# Patient Record
Sex: FEMALE | Race: WHITE | Employment: OTHER | URBAN - METROPOLITAN AREA
[De-identification: names, ages, dates, MRNs, and addresses within clinical notes are randomized per-mention and may not be internally consistent; named-entity substitution may affect disease eponyms.]

---

## 2018-06-10 ENCOUNTER — APPOINTMENT (OUTPATIENT)
Dept: GENERAL RADIOLOGY | Age: 68
End: 2018-06-10
Payer: MEDICARE

## 2018-06-10 ENCOUNTER — HOSPITAL ENCOUNTER (EMERGENCY)
Age: 68
Discharge: HOME | End: 2018-06-10
Attending: FAMILY MEDICINE
Payer: MEDICARE

## 2018-06-10 VITALS
RESPIRATION RATE: 17 BRPM | HEIGHT: 68 IN | WEIGHT: 217 LBS | OXYGEN SATURATION: 97 % | TEMPERATURE: 98.2 F | HEART RATE: 82 BPM | DIASTOLIC BLOOD PRESSURE: 72 MMHG | BODY MASS INDEX: 32.89 KG/M2 | SYSTOLIC BLOOD PRESSURE: 140 MMHG

## 2018-06-10 DIAGNOSIS — J20.9 ACUTE BRONCHITIS, UNSPECIFIED ORGANISM: Primary | ICD-10-CM

## 2018-06-10 LAB
BILIRUBIN URINE: NEGATIVE
BLOOD, URINE: NEGATIVE
CLARITY: CLEAR
COLOR: YELLOW
GLUCOSE URINE: >=1000 MG/DL
KETONES, URINE: NEGATIVE MG/DL
LEUKOCYTE ESTERASE, URINE: NEGATIVE
NITRITE, URINE: NEGATIVE
PH UA: 5.5 (ref 5–9)
PROTEIN UA: NEGATIVE MG/DL
SPECIFIC GRAVITY UA: 1.01 (ref 1–1.03)
UROBILINOGEN, URINE: 0.2 E.U./DL

## 2018-06-10 PROCEDURE — 81003 URINALYSIS AUTO W/O SCOPE: CPT

## 2018-06-10 PROCEDURE — 87077 CULTURE AEROBIC IDENTIFY: CPT

## 2018-06-10 PROCEDURE — 6370000000 HC RX 637 (ALT 250 FOR IP): Performed by: FAMILY MEDICINE

## 2018-06-10 PROCEDURE — 87088 URINE BACTERIA CULTURE: CPT

## 2018-06-10 PROCEDURE — 71046 X-RAY EXAM CHEST 2 VIEWS: CPT

## 2018-06-10 PROCEDURE — 99284 EMERGENCY DEPT VISIT MOD MDM: CPT

## 2018-06-10 PROCEDURE — 87186 SC STD MICRODIL/AGAR DIL: CPT

## 2018-06-10 RX ORDER — PREDNISONE 20 MG/1
40 TABLET ORAL ONCE
Status: COMPLETED | OUTPATIENT
Start: 2018-06-10 | End: 2018-06-10

## 2018-06-10 RX ORDER — IPRATROPIUM BROMIDE AND ALBUTEROL SULFATE 2.5; .5 MG/3ML; MG/3ML
1 SOLUTION RESPIRATORY (INHALATION) ONCE
Status: COMPLETED | OUTPATIENT
Start: 2018-06-10 | End: 2018-06-10

## 2018-06-10 RX ORDER — PREDNISONE 10 MG/1
TABLET ORAL
Qty: 10 TABLET | Refills: 0 | Status: SHIPPED | OUTPATIENT
Start: 2018-06-10 | End: 2018-06-20

## 2018-06-10 RX ORDER — AZITHROMYCIN 250 MG/1
TABLET, FILM COATED ORAL
Qty: 1 PACKET | Refills: 0 | Status: ON HOLD | OUTPATIENT
Start: 2018-06-10 | End: 2018-12-07 | Stop reason: HOSPADM

## 2018-06-10 RX ADMIN — PREDNISONE 40 MG: 20 TABLET ORAL at 14:17

## 2018-06-10 RX ADMIN — IPRATROPIUM BROMIDE AND ALBUTEROL SULFATE 1 AMPULE: .5; 3 SOLUTION RESPIRATORY (INHALATION) at 14:17

## 2018-06-12 LAB
ORGANISM: ABNORMAL
ORGANISM: ABNORMAL
URINE CULTURE, ROUTINE: ABNORMAL

## 2018-09-10 ENCOUNTER — HOSPITAL ENCOUNTER (OUTPATIENT)
Age: 68
Discharge: HOME | End: 2018-09-12
Payer: MEDICARE

## 2018-09-10 PROCEDURE — 87088 URINE BACTERIA CULTURE: CPT

## 2018-09-10 PROCEDURE — 87186 SC STD MICRODIL/AGAR DIL: CPT

## 2018-09-12 LAB
ORGANISM: ABNORMAL
URINE CULTURE, ROUTINE: ABNORMAL
URINE CULTURE, ROUTINE: ABNORMAL

## 2018-12-04 ENCOUNTER — HOSPITAL ENCOUNTER (OUTPATIENT)
Age: 68
Discharge: HOME | End: 2018-12-06
Payer: MEDICARE

## 2018-12-04 PROCEDURE — 87088 URINE BACTERIA CULTURE: CPT

## 2018-12-04 PROCEDURE — 87186 SC STD MICRODIL/AGAR DIL: CPT

## 2018-12-04 PROCEDURE — 87077 CULTURE AEROBIC IDENTIFY: CPT

## 2018-12-05 ENCOUNTER — APPOINTMENT (OUTPATIENT)
Dept: GENERAL RADIOLOGY | Age: 68
DRG: 872 | End: 2018-12-05
Payer: MEDICARE

## 2018-12-05 ENCOUNTER — HOSPITAL ENCOUNTER (INPATIENT)
Age: 68
LOS: 2 days | Discharge: HOME | DRG: 872 | End: 2018-12-07
Attending: EMERGENCY MEDICINE | Admitting: INTERNAL MEDICINE
Payer: MEDICARE

## 2018-12-05 DIAGNOSIS — A41.9 SEPTICEMIA (HCC): ICD-10-CM

## 2018-12-05 DIAGNOSIS — N39.0 URINARY TRACT INFECTION WITHOUT HEMATURIA, SITE UNSPECIFIED: Primary | ICD-10-CM

## 2018-12-05 PROBLEM — Z16.12 UTI DUE TO EXTENDED-SPECTRUM BETA LACTAMASE (ESBL) PRODUCING ESCHERICHIA COLI: Status: ACTIVE | Noted: 2018-12-05

## 2018-12-05 PROBLEM — B96.29 UTI DUE TO EXTENDED-SPECTRUM BETA LACTAMASE (ESBL) PRODUCING ESCHERICHIA COLI: Status: ACTIVE | Noted: 2018-12-05

## 2018-12-05 LAB
ALBUMIN SERPL-MCNC: 4.6 G/DL (ref 3.5–5.2)
ALP BLD-CCNC: 91 U/L (ref 35–104)
ALT SERPL-CCNC: 23 U/L (ref 0–32)
ANION GAP SERPL CALCULATED.3IONS-SCNC: 13 MMOL/L (ref 7–16)
AST SERPL-CCNC: 38 U/L (ref 0–31)
BACTERIA: NORMAL /HPF
BASOPHILS ABSOLUTE: 0.05 E9/L (ref 0–0.2)
BASOPHILS RELATIVE PERCENT: 0.4 % (ref 0–2)
BILIRUB SERPL-MCNC: 0.5 MG/DL (ref 0–1.2)
BILIRUBIN URINE: NEGATIVE
BLOOD, URINE: ABNORMAL
BUN BLDV-MCNC: 16 MG/DL (ref 8–23)
CALCIUM SERPL-MCNC: 10.4 MG/DL (ref 8.6–10.2)
CHLORIDE BLD-SCNC: 96 MMOL/L (ref 98–107)
CLARITY: CLEAR
CO2: 25 MMOL/L (ref 22–29)
COLOR: YELLOW
CREAT SERPL-MCNC: 1.4 MG/DL (ref 0.5–1)
EOSINOPHILS ABSOLUTE: 0.03 E9/L (ref 0.05–0.5)
EOSINOPHILS RELATIVE PERCENT: 0.2 % (ref 0–6)
EPITHELIAL CELLS, UA: NORMAL /HPF
GFR AFRICAN AMERICAN: 45
GFR NON-AFRICAN AMERICAN: 37 ML/MIN/1.73
GLUCOSE BLD-MCNC: 150 MG/DL (ref 74–99)
GLUCOSE URINE: NEGATIVE MG/DL
HCT VFR BLD CALC: 39.9 % (ref 34–48)
HEMOGLOBIN: 12.9 G/DL (ref 11.5–15.5)
IMMATURE GRANULOCYTES #: 0.04 E9/L
IMMATURE GRANULOCYTES %: 0.3 % (ref 0–5)
KETONES, URINE: NEGATIVE MG/DL
LACTIC ACID: 1.7 MMOL/L (ref 0.5–2.2)
LEUKOCYTE ESTERASE, URINE: ABNORMAL
LYMPHOCYTES ABSOLUTE: 1.19 E9/L (ref 1.5–4)
LYMPHOCYTES RELATIVE PERCENT: 9.8 % (ref 20–42)
MCH RBC QN AUTO: 30.7 PG (ref 26–35)
MCHC RBC AUTO-ENTMCNC: 32.3 % (ref 32–34.5)
MCV RBC AUTO: 95 FL (ref 80–99.9)
MONOCYTES ABSOLUTE: 1.18 E9/L (ref 0.1–0.95)
MONOCYTES RELATIVE PERCENT: 9.7 % (ref 2–12)
NEUTROPHILS ABSOLUTE: 9.68 E9/L (ref 1.8–7.3)
NEUTROPHILS RELATIVE PERCENT: 79.6 % (ref 43–80)
NITRITE, URINE: NEGATIVE
PDW BLD-RTO: 13.2 FL (ref 11.5–15)
PH UA: 5.5 (ref 5–9)
PLATELET # BLD: 237 E9/L (ref 130–450)
PMV BLD AUTO: 10 FL (ref 7–12)
POTASSIUM SERPL-SCNC: 5.2 MMOL/L (ref 3.5–5)
PROTEIN UA: ABNORMAL MG/DL
RBC # BLD: 4.2 E12/L (ref 3.5–5.5)
RBC UA: NORMAL /HPF (ref 0–2)
SODIUM BLD-SCNC: 134 MMOL/L (ref 132–146)
SPECIFIC GRAVITY UA: 1.01 (ref 1–1.03)
TOTAL PROTEIN: 7.9 G/DL (ref 6.4–8.3)
UROBILINOGEN, URINE: 0.2 E.U./DL
WBC # BLD: 12.2 E9/L (ref 4.5–11.5)
WBC UA: >20 /HPF (ref 0–5)

## 2018-12-05 PROCEDURE — 96374 THER/PROPH/DIAG INJ IV PUSH: CPT

## 2018-12-05 PROCEDURE — 85025 COMPLETE CBC W/AUTO DIFF WBC: CPT

## 2018-12-05 PROCEDURE — 99285 EMERGENCY DEPT VISIT HI MDM: CPT

## 2018-12-05 PROCEDURE — 87186 SC STD MICRODIL/AGAR DIL: CPT

## 2018-12-05 PROCEDURE — 83605 ASSAY OF LACTIC ACID: CPT

## 2018-12-05 PROCEDURE — 80053 COMPREHEN METABOLIC PANEL: CPT

## 2018-12-05 PROCEDURE — 6360000002 HC RX W HCPCS: Performed by: EMERGENCY MEDICINE

## 2018-12-05 PROCEDURE — 87040 BLOOD CULTURE FOR BACTERIA: CPT

## 2018-12-05 PROCEDURE — 2580000003 HC RX 258: Performed by: EMERGENCY MEDICINE

## 2018-12-05 PROCEDURE — 87077 CULTURE AEROBIC IDENTIFY: CPT

## 2018-12-05 PROCEDURE — 87088 URINE BACTERIA CULTURE: CPT

## 2018-12-05 PROCEDURE — 1200000000 HC SEMI PRIVATE

## 2018-12-05 PROCEDURE — 71045 X-RAY EXAM CHEST 1 VIEW: CPT

## 2018-12-05 PROCEDURE — 36415 COLL VENOUS BLD VENIPUNCTURE: CPT

## 2018-12-05 PROCEDURE — 81001 URINALYSIS AUTO W/SCOPE: CPT

## 2018-12-05 RX ORDER — 0.9 % SODIUM CHLORIDE 0.9 %
1000 INTRAVENOUS SOLUTION INTRAVENOUS ONCE
Status: COMPLETED | OUTPATIENT
Start: 2018-12-05 | End: 2018-12-05

## 2018-12-05 RX ORDER — KETOROLAC TROMETHAMINE 30 MG/ML
15 INJECTION, SOLUTION INTRAMUSCULAR; INTRAVENOUS ONCE
Status: COMPLETED | OUTPATIENT
Start: 2018-12-05 | End: 2018-12-05

## 2018-12-05 RX ORDER — LEVOFLOXACIN 5 MG/ML
750 INJECTION, SOLUTION INTRAVENOUS ONCE
Status: COMPLETED | OUTPATIENT
Start: 2018-12-05 | End: 2018-12-05

## 2018-12-05 RX ORDER — ACETAMINOPHEN 325 MG/1
650 TABLET ORAL EVERY 4 HOURS PRN
Status: DISCONTINUED | OUTPATIENT
Start: 2018-12-05 | End: 2018-12-07 | Stop reason: HOSPADM

## 2018-12-05 RX ORDER — SODIUM CHLORIDE 9 MG/ML
INJECTION, SOLUTION INTRAVENOUS CONTINUOUS
Status: DISCONTINUED | OUTPATIENT
Start: 2018-12-05 | End: 2018-12-07 | Stop reason: HOSPADM

## 2018-12-05 RX ORDER — SODIUM CHLORIDE 0.9 % (FLUSH) 0.9 %
10 SYRINGE (ML) INJECTION EVERY 12 HOURS SCHEDULED
Status: DISCONTINUED | OUTPATIENT
Start: 2018-12-05 | End: 2018-12-07 | Stop reason: HOSPADM

## 2018-12-05 RX ORDER — SODIUM CHLORIDE 0.9 % (FLUSH) 0.9 %
10 SYRINGE (ML) INJECTION PRN
Status: DISCONTINUED | OUTPATIENT
Start: 2018-12-05 | End: 2018-12-07 | Stop reason: HOSPADM

## 2018-12-05 RX ORDER — ONDANSETRON 2 MG/ML
4 INJECTION INTRAMUSCULAR; INTRAVENOUS EVERY 8 HOURS PRN
Status: DISCONTINUED | OUTPATIENT
Start: 2018-12-05 | End: 2018-12-07 | Stop reason: HOSPADM

## 2018-12-05 RX ADMIN — SODIUM CHLORIDE 1000 ML: 9 INJECTION, SOLUTION INTRAVENOUS at 18:41

## 2018-12-05 RX ADMIN — LEVOFLOXACIN 750 MG: 5 INJECTION, SOLUTION INTRAVENOUS at 20:55

## 2018-12-05 RX ADMIN — KETOROLAC TROMETHAMINE 15 MG: 30 INJECTION, SOLUTION INTRAMUSCULAR at 18:43

## 2018-12-05 ASSESSMENT — PAIN SCALES - GENERAL
PAINLEVEL_OUTOF10: 4
PAINLEVEL_OUTOF10: 0

## 2018-12-06 PROBLEM — N39.0 URINARY TRACT INFECTION: Status: ACTIVE | Noted: 2018-12-06

## 2018-12-06 LAB
FILM ARRAY ADENOVIRUS: NORMAL
FILM ARRAY BORDETELLA PERTUSSIS: NORMAL
FILM ARRAY CHLAMYDOPHILIA PNEUMONIAE: NORMAL
FILM ARRAY CORONAVIRUS 229E: NORMAL
FILM ARRAY CORONAVIRUS HKU1: NORMAL
FILM ARRAY CORONAVIRUS NL63: NORMAL
FILM ARRAY CORONAVIRUS OC43: NORMAL
FILM ARRAY INFLUENZA A VIRUS 09H1: NORMAL
FILM ARRAY INFLUENZA A VIRUS H1: NORMAL
FILM ARRAY INFLUENZA A VIRUS H3: NORMAL
FILM ARRAY INFLUENZA A VIRUS: NORMAL
FILM ARRAY INFLUENZA B: NORMAL
FILM ARRAY METAPNEUMOVIRUS: NORMAL
FILM ARRAY MYCOPLASMA PNEUMONIAE: NORMAL
FILM ARRAY PARAINFLUENZA VIRUS 1: NORMAL
FILM ARRAY PARAINFLUENZA VIRUS 2: NORMAL
FILM ARRAY PARAINFLUENZA VIRUS 3: NORMAL
FILM ARRAY PARAINFLUENZA VIRUS 4: NORMAL
FILM ARRAY RESPIRATORY SYNCITIAL VIRUS: NORMAL
FILM ARRAY RHINOVIRUS/ENTEROVIRUS: NORMAL
METER GLUCOSE: 172 MG/DL (ref 74–99)
METER GLUCOSE: 200 MG/DL (ref 74–99)
METER GLUCOSE: 225 MG/DL (ref 74–99)
METER GLUCOSE: 245 MG/DL (ref 74–99)
ORGANISM: ABNORMAL
URINE CULTURE, ROUTINE: ABNORMAL
URINE CULTURE, ROUTINE: ABNORMAL

## 2018-12-06 PROCEDURE — G8980 MOBILITY D/C STATUS: HCPCS

## 2018-12-06 PROCEDURE — 1200000000 HC SEMI PRIVATE

## 2018-12-06 PROCEDURE — 97161 PT EVAL LOW COMPLEX 20 MIN: CPT

## 2018-12-06 PROCEDURE — G8978 MOBILITY CURRENT STATUS: HCPCS

## 2018-12-06 PROCEDURE — 6360000002 HC RX W HCPCS: Performed by: INTERNAL MEDICINE

## 2018-12-06 PROCEDURE — 87633 RESP VIRUS 12-25 TARGETS: CPT

## 2018-12-06 PROCEDURE — 82962 GLUCOSE BLOOD TEST: CPT

## 2018-12-06 PROCEDURE — 2580000003 HC RX 258: Performed by: INTERNAL MEDICINE

## 2018-12-06 PROCEDURE — 6370000000 HC RX 637 (ALT 250 FOR IP): Performed by: INTERNAL MEDICINE

## 2018-12-06 PROCEDURE — 87486 CHLMYD PNEUM DNA AMP PROBE: CPT

## 2018-12-06 PROCEDURE — 87798 DETECT AGENT NOS DNA AMP: CPT

## 2018-12-06 PROCEDURE — 87581 M.PNEUMON DNA AMP PROBE: CPT

## 2018-12-06 RX ORDER — HYDROCHLOROTHIAZIDE 25 MG/1
25 TABLET ORAL DAILY
Status: DISCONTINUED | OUTPATIENT
Start: 2018-12-06 | End: 2018-12-07 | Stop reason: HOSPADM

## 2018-12-06 RX ORDER — OMEPRAZOLE 20 MG/1
20 CAPSULE, DELAYED RELEASE ORAL EVERY OTHER DAY
Status: DISCONTINUED | OUTPATIENT
Start: 2018-12-06 | End: 2018-12-06

## 2018-12-06 RX ORDER — GLIMEPIRIDE 2 MG/1
2 TABLET ORAL
Status: DISCONTINUED | OUTPATIENT
Start: 2018-12-06 | End: 2018-12-07 | Stop reason: HOSPADM

## 2018-12-06 RX ORDER — LEVOTHYROXINE SODIUM 0.12 MG/1
125 TABLET ORAL DAILY
Status: DISCONTINUED | OUTPATIENT
Start: 2018-12-06 | End: 2018-12-07 | Stop reason: HOSPADM

## 2018-12-06 RX ORDER — DEXTROSE MONOHYDRATE 50 MG/ML
100 INJECTION, SOLUTION INTRAVENOUS PRN
Status: DISCONTINUED | OUTPATIENT
Start: 2018-12-06 | End: 2018-12-07 | Stop reason: HOSPADM

## 2018-12-06 RX ORDER — LOSARTAN POTASSIUM AND HYDROCHLOROTHIAZIDE 25; 100 MG/1; MG/1
1 TABLET ORAL DAILY
Status: DISCONTINUED | OUTPATIENT
Start: 2018-12-06 | End: 2018-12-06

## 2018-12-06 RX ORDER — LOSARTAN POTASSIUM 50 MG/1
100 TABLET ORAL DAILY
Status: DISCONTINUED | OUTPATIENT
Start: 2018-12-06 | End: 2018-12-07 | Stop reason: HOSPADM

## 2018-12-06 RX ORDER — SODIUM CHLORIDE 0.9 % (FLUSH) 0.9 %
10 SYRINGE (ML) INJECTION EVERY 12 HOURS SCHEDULED
Status: DISCONTINUED | OUTPATIENT
Start: 2018-12-06 | End: 2018-12-07 | Stop reason: HOSPADM

## 2018-12-06 RX ORDER — ONDANSETRON 2 MG/ML
4 INJECTION INTRAMUSCULAR; INTRAVENOUS EVERY 6 HOURS PRN
Status: DISCONTINUED | OUTPATIENT
Start: 2018-12-06 | End: 2018-12-07 | Stop reason: HOSPADM

## 2018-12-06 RX ORDER — AMLODIPINE BESYLATE 5 MG/1
5 TABLET ORAL DAILY
Status: DISCONTINUED | OUTPATIENT
Start: 2018-12-06 | End: 2018-12-07 | Stop reason: HOSPADM

## 2018-12-06 RX ORDER — NICOTINE POLACRILEX 4 MG
15 LOZENGE BUCCAL PRN
Status: DISCONTINUED | OUTPATIENT
Start: 2018-12-06 | End: 2018-12-07 | Stop reason: HOSPADM

## 2018-12-06 RX ORDER — PRAVASTATIN SODIUM 20 MG
20 TABLET ORAL DAILY
Status: DISCONTINUED | OUTPATIENT
Start: 2018-12-06 | End: 2018-12-07 | Stop reason: HOSPADM

## 2018-12-06 RX ORDER — PANTOPRAZOLE SODIUM 40 MG/1
40 TABLET, DELAYED RELEASE ORAL EVERY OTHER DAY
Status: DISCONTINUED | OUTPATIENT
Start: 2018-12-06 | End: 2018-12-07 | Stop reason: HOSPADM

## 2018-12-06 RX ORDER — SODIUM CHLORIDE 0.9 % (FLUSH) 0.9 %
10 SYRINGE (ML) INJECTION PRN
Status: DISCONTINUED | OUTPATIENT
Start: 2018-12-06 | End: 2018-12-07 | Stop reason: HOSPADM

## 2018-12-06 RX ORDER — DEXTROSE MONOHYDRATE 25 G/50ML
12.5 INJECTION, SOLUTION INTRAVENOUS PRN
Status: DISCONTINUED | OUTPATIENT
Start: 2018-12-06 | End: 2018-12-07 | Stop reason: HOSPADM

## 2018-12-06 RX ADMIN — HYDROCHLOROTHIAZIDE 25 MG: 25 TABLET ORAL at 08:42

## 2018-12-06 RX ADMIN — Medication 10 ML: at 00:37

## 2018-12-06 RX ADMIN — INSULIN LISPRO 1 UNITS: 100 INJECTION, SOLUTION INTRAVENOUS; SUBCUTANEOUS at 08:46

## 2018-12-06 RX ADMIN — ENOXAPARIN SODIUM 40 MG: 40 INJECTION SUBCUTANEOUS at 08:42

## 2018-12-06 RX ADMIN — SODIUM CHLORIDE: 9 INJECTION, SOLUTION INTRAVENOUS at 00:37

## 2018-12-06 RX ADMIN — Medication 10 ML: at 08:42

## 2018-12-06 RX ADMIN — LINAGLIPTIN 5 MG: 5 TABLET, FILM COATED ORAL at 08:42

## 2018-12-06 RX ADMIN — PANTOPRAZOLE SODIUM 40 MG: 40 TABLET, DELAYED RELEASE ORAL at 08:46

## 2018-12-06 RX ADMIN — Medication 10 ML: at 20:19

## 2018-12-06 RX ADMIN — INSULIN LISPRO 1 UNITS: 100 INJECTION, SOLUTION INTRAVENOUS; SUBCUTANEOUS at 21:00

## 2018-12-06 RX ADMIN — CEFTRIAXONE SODIUM 2 G: 2 INJECTION, POWDER, FOR SOLUTION INTRAMUSCULAR; INTRAVENOUS at 18:00

## 2018-12-06 RX ADMIN — INSULIN LISPRO 2 UNITS: 100 INJECTION, SOLUTION INTRAVENOUS; SUBCUTANEOUS at 12:02

## 2018-12-06 RX ADMIN — AMLODIPINE BESYLATE 5 MG: 5 TABLET ORAL at 08:42

## 2018-12-06 RX ADMIN — LOSARTAN POTASSIUM 100 MG: 50 TABLET, FILM COATED ORAL at 08:42

## 2018-12-06 RX ADMIN — PRAVASTATIN SODIUM 20 MG: 20 TABLET ORAL at 08:42

## 2018-12-06 RX ADMIN — LEVOTHYROXINE SODIUM 125 MCG: 125 TABLET ORAL at 08:46

## 2018-12-06 RX ADMIN — SODIUM CHLORIDE: 9 INJECTION, SOLUTION INTRAVENOUS at 12:03

## 2018-12-06 RX ADMIN — GLIMEPIRIDE 2 MG: 2 TABLET ORAL at 17:03

## 2018-12-06 RX ADMIN — INSULIN LISPRO 2 UNITS: 100 INJECTION, SOLUTION INTRAVENOUS; SUBCUTANEOUS at 17:03

## 2018-12-06 ASSESSMENT — PAIN SCALES - GENERAL: PAINLEVEL_OUTOF10: 0

## 2018-12-06 NOTE — H&P
CHIEF COMPLAINT:  Fever and chills      HISTORY OF PRESENT ILLNESS:      The patient is a 77 y.o. female patient presents with shaking chills. She documented no temp as she never took it at home. Some back pain and cramping. Some cough. No abdominal pain. Sick contacts at home. Apparently in hospital year ago with sepsis and UTI. May have been atb resistant E.coli (this is documented but I can pull up the past culture). Past Medical History:    Past Medical History:   Diagnosis Date    Cancer ([facility]     Diabetes mellitus ([facility]     Hypertension     Hypothyroidism     Thyroid disease     Urinary incontinence        Past Surgical History:    Past Surgical History:   Procedure Laterality Date    THYROIDECTOMY      TUBAL LIGATION         Medications Prior to Admission:    Prescriptions Prior to Admission: azithromycin (ZITHROMAX Z-SEBASTIAN) 250 MG tablet, TAKE 500MG PO DAY ONE. .. 250MG PO DAY TWO THROUGH FIVE  DISPENSE 6 TABS NO REFILLS  albuterol-ipratropium (COMBIVENT RESPIMAT)  MCG/ACT AERS inhaler, Inhale 1 puff into the lungs every 6 hours as needed for Wheezing or Shortness of Breath (cough)  amLODIPine (NORVASC) 5 MG tablet, Take 5 mg by mouth daily  pravastatin (PRAVACHOL) 20 MG tablet, Take 20 mg by mouth daily  metFORMIN (GLUCOPHAGE) 500 MG tablet, Take 1 tablet by mouth 2 times daily (with meals). 2 tablets twice daily  levothyroxine (LEVOXYL) 125 MCG tablet, Takes 1 tablet Monday through Saturday. Take 1 and 1/2 tablet on Sunday. sitaGLIPtan (JANUVIA) 100 MG tablet, Take 1 tablet by mouth every morning. glimepiride (AMARYL) 2 MG tablet, Take 1 tablet by mouth Daily with supper. 2 mg po daily  vitamin D (ERGOCALCIFEROL) 29339 UNITS CAPS capsule, Take 1 capsule by mouth every 30 days. cyanocobalamin ([employer]S VITAMIN B12) 1000 MCG tablet, Take 1 tablet by mouth daily. losartan-hydrochlorothiazide (HYZAAR) 100-25 MG per tablet, Take 1 tablet by mouth daily.     omeprazole (PRILOSEC) 20 MG capsule, Component Value Date     12/05/2018    K 5.2 12/05/2018    CL 96 12/05/2018    CO2 25 12/05/2018    BUN 16 12/05/2018    CREATININE 1.4 12/05/2018    GFRAA 45 12/05/2018    LABGLOM 37 12/05/2018    GLUCOSE 150 12/05/2018    PROT 7.9 12/05/2018    LABALBU 4.6 12/05/2018    CALCIUM 10.4 12/05/2018    BILITOT 0.5 12/05/2018    ALKPHOS 91 12/05/2018    AST 38 12/05/2018    ALT 23 12/05/2018     PT/INR:    Lab Results   Component Value Date    PROTIME 13.1 03/05/2016    INR 1.2 03/05/2016     @cktotal:3,ckmb:3,ckmbindex:3,troponini:3@  U/A:  Lab Results   Component Value Date    NITRU Negative 12/05/2018    COLORU Yellow 12/05/2018    PHUR 5.5 12/05/2018    WBCUA >20 12/05/2018    RBCUA 2-5 12/05/2018    BACTERIA NONE 12/05/2018    CLARITYU Clear 12/05/2018    SPECGRAV 1.010 12/05/2018    UROBILINOGEN 0.2 12/05/2018    BILIRUBINUR Negative 12/05/2018    BLOODU MODERATE 12/05/2018    GLUCOSEU Negative 12/05/2018    KETUA Negative 12/05/2018          ASSESSMENT:      Principal Problem:    Sepsis ([address]  Active Problems:    Type 2 diabetes mellitus without complication (HCC)    Urinary tract infection  Resolved Problems:    * No resolved hospital problems.  *          PLAN:    SIRS-with temp to 100.5 and shaking chills-ask ID to see, repsiartory panel as well await cultures    DM-follow bs and sliding scale hold metformin for a day    Acute kidney injury from [employer], DO  6:35 AM  12/6/2018

## 2018-12-07 VITALS
BODY MASS INDEX: 30.31 KG/M2 | OXYGEN SATURATION: 98 % | TEMPERATURE: 98.1 F | WEIGHT: 200 LBS | RESPIRATION RATE: 18 BRPM | SYSTOLIC BLOOD PRESSURE: 118 MMHG | HEIGHT: 68 IN | DIASTOLIC BLOOD PRESSURE: 67 MMHG | HEART RATE: 75 BPM

## 2018-12-07 LAB
ALBUMIN SERPL-MCNC: 3.5 G/DL (ref 3.5–5.2)
ALP BLD-CCNC: 66 U/L (ref 35–104)
ALT SERPL-CCNC: 17 U/L (ref 0–32)
ANION GAP SERPL CALCULATED.3IONS-SCNC: 11 MMOL/L (ref 7–16)
AST SERPL-CCNC: 13 U/L (ref 0–31)
BASOPHILS ABSOLUTE: 0.03 E9/L (ref 0–0.2)
BASOPHILS RELATIVE PERCENT: 0.5 % (ref 0–2)
BILIRUB SERPL-MCNC: 0.3 MG/DL (ref 0–1.2)
BUN BLDV-MCNC: 12 MG/DL (ref 8–23)
CALCIUM SERPL-MCNC: 9.2 MG/DL (ref 8.6–10.2)
CHLORIDE BLD-SCNC: 106 MMOL/L (ref 98–107)
CO2: 24 MMOL/L (ref 22–29)
CREAT SERPL-MCNC: 1.1 MG/DL (ref 0.5–1)
EOSINOPHILS ABSOLUTE: 0.14 E9/L (ref 0.05–0.5)
EOSINOPHILS RELATIVE PERCENT: 2.1 % (ref 0–6)
GFR AFRICAN AMERICAN: 59
GFR NON-AFRICAN AMERICAN: 49 ML/MIN/1.73
GLUCOSE BLD-MCNC: 179 MG/DL (ref 74–99)
HCT VFR BLD CALC: 36.8 % (ref 34–48)
HEMOGLOBIN: 11.6 G/DL (ref 11.5–15.5)
IMMATURE GRANULOCYTES #: 0.03 E9/L
IMMATURE GRANULOCYTES %: 0.5 % (ref 0–5)
LYMPHOCYTES ABSOLUTE: 1.77 E9/L (ref 1.5–4)
LYMPHOCYTES RELATIVE PERCENT: 26.9 % (ref 20–42)
MCH RBC QN AUTO: 30.5 PG (ref 26–35)
MCHC RBC AUTO-ENTMCNC: 31.5 % (ref 32–34.5)
MCV RBC AUTO: 96.8 FL (ref 80–99.9)
METER GLUCOSE: 184 MG/DL (ref 74–99)
METER GLUCOSE: 199 MG/DL (ref 74–99)
MONOCYTES ABSOLUTE: 0.98 E9/L (ref 0.1–0.95)
MONOCYTES RELATIVE PERCENT: 14.9 % (ref 2–12)
NEUTROPHILS ABSOLUTE: 3.62 E9/L (ref 1.8–7.3)
NEUTROPHILS RELATIVE PERCENT: 55.1 % (ref 43–80)
ORGANISM: ABNORMAL
PDW BLD-RTO: 13.1 FL (ref 11.5–15)
PLATELET # BLD: 212 E9/L (ref 130–450)
PMV BLD AUTO: 10 FL (ref 7–12)
POTASSIUM REFLEX MAGNESIUM: 4.4 MMOL/L (ref 3.5–5)
POTASSIUM SERPL-SCNC: 4.4 MMOL/L (ref 3.5–5)
RBC # BLD: 3.8 E12/L (ref 3.5–5.5)
SODIUM BLD-SCNC: 141 MMOL/L (ref 132–146)
TOTAL PROTEIN: 6.2 G/DL (ref 6.4–8.3)
URINE CULTURE, ROUTINE: ABNORMAL
URINE CULTURE, ROUTINE: ABNORMAL
WBC # BLD: 6.6 E9/L (ref 4.5–11.5)

## 2018-12-07 PROCEDURE — 80048 BASIC METABOLIC PNL TOTAL CA: CPT

## 2018-12-07 PROCEDURE — 85025 COMPLETE CBC W/AUTO DIFF WBC: CPT

## 2018-12-07 PROCEDURE — 6370000000 HC RX 637 (ALT 250 FOR IP): Performed by: INTERNAL MEDICINE

## 2018-12-07 PROCEDURE — 82962 GLUCOSE BLOOD TEST: CPT

## 2018-12-07 PROCEDURE — 36415 COLL VENOUS BLD VENIPUNCTURE: CPT

## 2018-12-07 PROCEDURE — 80053 COMPREHEN METABOLIC PANEL: CPT

## 2018-12-07 PROCEDURE — 2580000003 HC RX 258: Performed by: INTERNAL MEDICINE

## 2018-12-07 PROCEDURE — 6360000002 HC RX W HCPCS: Performed by: INTERNAL MEDICINE

## 2018-12-07 RX ORDER — CIPROFLOXACIN 500 MG/1
500 TABLET, FILM COATED ORAL EVERY 12 HOURS SCHEDULED
Status: DISCONTINUED | OUTPATIENT
Start: 2018-12-07 | End: 2018-12-07 | Stop reason: HOSPADM

## 2018-12-07 RX ORDER — CIPROFLOXACIN 500 MG/1
500 TABLET, FILM COATED ORAL EVERY 12 HOURS SCHEDULED
Qty: 14 TABLET | Refills: 0 | Status: SHIPPED | OUTPATIENT
Start: 2018-12-07 | End: 2018-12-14

## 2018-12-07 RX ADMIN — INSULIN LISPRO 1 UNITS: 100 INJECTION, SOLUTION INTRAVENOUS; SUBCUTANEOUS at 12:25

## 2018-12-07 RX ADMIN — INSULIN LISPRO 1 UNITS: 100 INJECTION, SOLUTION INTRAVENOUS; SUBCUTANEOUS at 07:12

## 2018-12-07 RX ADMIN — LEVOTHYROXINE SODIUM 125 MCG: 125 TABLET ORAL at 06:11

## 2018-12-07 RX ADMIN — SODIUM CHLORIDE: 9 INJECTION, SOLUTION INTRAVENOUS at 06:14

## 2018-12-07 RX ADMIN — ENOXAPARIN SODIUM 40 MG: 40 INJECTION SUBCUTANEOUS at 08:37

## 2018-12-07 RX ADMIN — AMLODIPINE BESYLATE 5 MG: 5 TABLET ORAL at 08:37

## 2018-12-07 RX ADMIN — PRAVASTATIN SODIUM 20 MG: 20 TABLET ORAL at 08:37

## 2018-12-07 RX ADMIN — LOSARTAN POTASSIUM 100 MG: 50 TABLET, FILM COATED ORAL at 08:37

## 2018-12-07 RX ADMIN — Medication 10 ML: at 08:37

## 2018-12-07 RX ADMIN — HYDROCHLOROTHIAZIDE 25 MG: 25 TABLET ORAL at 08:37

## 2018-12-07 RX ADMIN — LINAGLIPTIN 5 MG: 5 TABLET, FILM COATED ORAL at 08:37

## 2018-12-07 ASSESSMENT — PAIN SCALES - GENERAL: PAINLEVEL_OUTOF10: 0

## 2018-12-08 ENCOUNTER — CARE COORDINATION (OUTPATIENT)
Dept: CASE MANAGEMENT | Age: 68
End: 2018-12-08

## 2018-12-09 NOTE — CARE COORDINATION
[facility] Transitions Initial Follow Up Call    Call within 2 business days of discharge: Yes    Patient: Marisel Fritz Patient : 1950   MRN: <[id number]>  Reason for Admission: There are no discharge diagnoses documented for the most recent discharge. Discharge Date: 18 RARS: Readmission Risk Score: 10         Facility: [place] [address] final attempt to reach pt for the BPCI-A. Left message to call transition care coordinator from [address] @ [phone]. Follow Up  No future appointments.     [address] [facility] Transition Coordinator  [phone]

## 2018-12-10 LAB
BLOOD CULTURE, ROUTINE: NORMAL
CULTURE, BLOOD 2: NORMAL

## 2018-12-22 NOTE — DISCHARGE SUMMARY
Monday through Saturday. Take 1 and 1/2 tablet on Sunday. , Disp-102 tablet, R-1      sitaGLIPtan (JANUVIA) 100 MG tablet Take 1 tablet by mouth every morning., Disp-90 tablet, R-1      glimepiride (AMARYL) 2 MG tablet Take 1 tablet by mouth Daily with supper. 2 mg po daily, Disp-90 tablet, R-1      vitamin D (ERGOCALCIFEROL) 99519 UNITS CAPS capsule Take 1 capsule by mouth every 30 days. , Disp-4 capsule, R-1      cyanocobalamin (CVS VITAMIN B12) 1000 MCG tablet Take 1 tablet by mouth daily. , Disp-30 tablet, R-3      losartan-hydrochlorothiazide (HYZAAR) 100-25 MG per tablet Take 1 tablet by mouth daily. omeprazole (PRILOSEC) 20 MG capsule Take 20 mg by mouth every other day. STOP taking these medications       azithromycin (ZITHROMAX Z-SEBASTIAN) 250 MG tablet Comments:   Reason for Stopping:             Activity: activity as tolerated  Diet: diabetic diet    Follow-up with Bernardino in 1 week.     Note that over 30 minutes was spent in preparing discharge papers, discussing discharge with patient, medication review, etc.    Signed:  Elia Martinez  12/22/2018  5:24 AM

## 2019-01-03 ENCOUNTER — CARE COORDINATION (OUTPATIENT)
Dept: CARE COORDINATION | Age: 69
End: 2019-01-03

## 2019-01-05 PROBLEM — N39.0 URINARY TRACT INFECTION: Status: RESOLVED | Noted: 2018-12-06 | Resolved: 2019-01-05

## 2019-01-28 ENCOUNTER — CARE COORDINATION (OUTPATIENT)
Dept: CASE MANAGEMENT | Age: 69
End: 2019-01-28

## 2019-02-11 ENCOUNTER — CARE COORDINATION (OUTPATIENT)
Dept: CARE COORDINATION | Age: 69
End: 2019-02-11

## 2019-02-20 ENCOUNTER — CARE COORDINATION (OUTPATIENT)
Dept: CASE MANAGEMENT | Age: 69
End: 2019-02-20

## 2019-03-08 ENCOUNTER — CARE COORDINATION (OUTPATIENT)
Dept: CASE MANAGEMENT | Age: 69
End: 2019-03-08

## 2019-08-08 ENCOUNTER — OFFICE VISIT (OUTPATIENT)
Dept: FAMILY MEDICINE CLINIC | Age: 69
End: 2019-08-08
Payer: MEDICARE

## 2019-08-08 VITALS
DIASTOLIC BLOOD PRESSURE: 60 MMHG | SYSTOLIC BLOOD PRESSURE: 124 MMHG | HEIGHT: 68 IN | RESPIRATION RATE: 16 BRPM | HEART RATE: 80 BPM | OXYGEN SATURATION: 99 % | TEMPERATURE: 98.7 F | BODY MASS INDEX: 33.19 KG/M2 | WEIGHT: 219 LBS

## 2019-08-08 DIAGNOSIS — T63.304A SPIDER BITE WOUND, UNDETERMINED INTENT, INITIAL ENCOUNTER: Primary | ICD-10-CM

## 2019-08-08 PROCEDURE — 4040F PNEUMOC VAC/ADMIN/RCVD: CPT | Performed by: FAMILY MEDICINE

## 2019-08-08 PROCEDURE — G8427 DOCREV CUR MEDS BY ELIG CLIN: HCPCS | Performed by: FAMILY MEDICINE

## 2019-08-08 PROCEDURE — 1036F TOBACCO NON-USER: CPT | Performed by: FAMILY MEDICINE

## 2019-08-08 PROCEDURE — 1123F ACP DISCUSS/DSCN MKR DOCD: CPT | Performed by: FAMILY MEDICINE

## 2019-08-08 PROCEDURE — 1090F PRES/ABSN URINE INCON ASSESS: CPT | Performed by: FAMILY MEDICINE

## 2019-08-08 PROCEDURE — 3017F COLORECTAL CA SCREEN DOC REV: CPT | Performed by: FAMILY MEDICINE

## 2019-08-08 PROCEDURE — G8417 CALC BMI ABV UP PARAM F/U: HCPCS | Performed by: FAMILY MEDICINE

## 2019-08-08 PROCEDURE — G8400 PT W/DXA NO RESULTS DOC: HCPCS | Performed by: FAMILY MEDICINE

## 2019-08-08 PROCEDURE — 99213 OFFICE O/P EST LOW 20 MIN: CPT | Performed by: FAMILY MEDICINE

## 2019-08-08 RX ORDER — VALACYCLOVIR HYDROCHLORIDE 1 G/1
TABLET, FILM COATED ORAL
Refills: 0 | COMMUNITY
Start: 2019-07-31 | End: 2019-11-02 | Stop reason: ALTCHOICE

## 2019-08-08 RX ORDER — SULFAMETHOXAZOLE AND TRIMETHOPRIM 800; 160 MG/1; MG/1
1 TABLET ORAL 2 TIMES DAILY
Qty: 14 TABLET | Refills: 0 | Status: SHIPPED | OUTPATIENT
Start: 2019-08-08 | End: 2019-08-15

## 2019-11-02 ENCOUNTER — HOSPITAL ENCOUNTER (EMERGENCY)
Age: 69
Discharge: HOME | End: 2019-11-02
Attending: EMERGENCY MEDICINE
Payer: MEDICARE

## 2019-11-02 VITALS
HEART RATE: 82 BPM | WEIGHT: 210 LBS | HEIGHT: 68 IN | TEMPERATURE: 98.2 F | DIASTOLIC BLOOD PRESSURE: 88 MMHG | SYSTOLIC BLOOD PRESSURE: 144 MMHG | BODY MASS INDEX: 31.83 KG/M2 | OXYGEN SATURATION: 96 % | RESPIRATION RATE: 16 BRPM

## 2019-11-02 DIAGNOSIS — R30.0 DYSURIA: Primary | ICD-10-CM

## 2019-11-02 LAB
BILIRUBIN URINE: NEGATIVE
BLOOD, URINE: NEGATIVE
CLARITY: CLEAR
COLOR: YELLOW
GLUCOSE URINE: NEGATIVE MG/DL
KETONES, URINE: NEGATIVE MG/DL
LEUKOCYTE ESTERASE, URINE: NEGATIVE
NITRITE, URINE: NEGATIVE
PH UA: 5.5 (ref 5–9)
PROTEIN UA: NEGATIVE MG/DL
SPECIFIC GRAVITY UA: 1.02 (ref 1–1.03)
UROBILINOGEN, URINE: 0.2 E.U./DL

## 2019-11-02 PROCEDURE — 99283 EMERGENCY DEPT VISIT LOW MDM: CPT

## 2019-11-02 PROCEDURE — 81003 URINALYSIS AUTO W/O SCOPE: CPT

## 2019-11-02 RX ORDER — CEFDINIR 300 MG/1
300 CAPSULE ORAL 2 TIMES DAILY
Qty: 14 CAPSULE | Refills: 0 | Status: SHIPPED | OUTPATIENT
Start: 2019-11-02 | End: 2019-11-09

## 2020-02-24 ENCOUNTER — HOSPITAL ENCOUNTER (EMERGENCY)
Age: 70
Discharge: HOME | End: 2020-02-25
Payer: MEDICARE

## 2020-02-24 PROCEDURE — 99283 EMERGENCY DEPT VISIT LOW MDM: CPT

## 2020-02-24 RX ORDER — 0.9 % SODIUM CHLORIDE 0.9 %
1000 INTRAVENOUS SOLUTION INTRAVENOUS ONCE
Status: COMPLETED | OUTPATIENT
Start: 2020-02-25 | End: 2020-02-25

## 2020-02-25 VITALS
SYSTOLIC BLOOD PRESSURE: 116 MMHG | RESPIRATION RATE: 16 BRPM | DIASTOLIC BLOOD PRESSURE: 58 MMHG | BODY MASS INDEX: 30.01 KG/M2 | OXYGEN SATURATION: 93 % | TEMPERATURE: 98 F | HEIGHT: 68 IN | WEIGHT: 198 LBS | HEART RATE: 81 BPM

## 2020-02-25 LAB
ANION GAP SERPL CALCULATED.3IONS-SCNC: 13 MMOL/L (ref 7–16)
BACTERIA: ABNORMAL /HPF
BASOPHILS ABSOLUTE: 0.03 E9/L (ref 0–0.2)
BASOPHILS RELATIVE PERCENT: 0.3 % (ref 0–2)
BILIRUBIN URINE: ABNORMAL
BLOOD, URINE: ABNORMAL
BUN BLDV-MCNC: 20 MG/DL (ref 8–23)
CALCIUM SERPL-MCNC: 9.8 MG/DL (ref 8.6–10.2)
CHLORIDE BLD-SCNC: 99 MMOL/L (ref 98–107)
CLARITY: ABNORMAL
CO2: 26 MMOL/L (ref 22–29)
COLOR: ABNORMAL
CREAT SERPL-MCNC: 1.3 MG/DL (ref 0.5–1)
EOSINOPHILS ABSOLUTE: 0.24 E9/L (ref 0.05–0.5)
EOSINOPHILS RELATIVE PERCENT: 2.5 % (ref 0–6)
GFR AFRICAN AMERICAN: 49
GFR NON-AFRICAN AMERICAN: 40 ML/MIN/1.73
GLUCOSE BLD-MCNC: 202 MG/DL (ref 74–99)
GLUCOSE URINE: NEGATIVE MG/DL
HCT VFR BLD CALC: 35.9 % (ref 34–48)
HEMOGLOBIN: 11.6 G/DL (ref 11.5–15.5)
IMMATURE GRANULOCYTES #: 0.06 E9/L
IMMATURE GRANULOCYTES %: 0.6 % (ref 0–5)
KETONES, URINE: ABNORMAL MG/DL
LEUKOCYTE ESTERASE, URINE: ABNORMAL
LYMPHOCYTES ABSOLUTE: 2.74 E9/L (ref 1.5–4)
LYMPHOCYTES RELATIVE PERCENT: 28.9 % (ref 20–42)
MCH RBC QN AUTO: 30.4 PG (ref 26–35)
MCHC RBC AUTO-ENTMCNC: 32.3 % (ref 32–34.5)
MCV RBC AUTO: 94 FL (ref 80–99.9)
MONOCYTES ABSOLUTE: 0.78 E9/L (ref 0.1–0.95)
MONOCYTES RELATIVE PERCENT: 8.2 % (ref 2–12)
NEUTROPHILS ABSOLUTE: 5.63 E9/L (ref 1.8–7.3)
NEUTROPHILS RELATIVE PERCENT: 59.5 % (ref 43–80)
NITRITE, URINE: POSITIVE
PDW BLD-RTO: 13.1 FL (ref 11.5–15)
PH UA: 6.5 (ref 5–9)
PLATELET # BLD: 267 E9/L (ref 130–450)
PMV BLD AUTO: 9.7 FL (ref 7–12)
POTASSIUM SERPL-SCNC: 4 MMOL/L (ref 3.5–5)
PROTEIN UA: 100 MG/DL
RBC # BLD: 3.82 E12/L (ref 3.5–5.5)
RBC UA: ABNORMAL /HPF (ref 0–2)
SODIUM BLD-SCNC: 138 MMOL/L (ref 132–146)
SPECIFIC GRAVITY UA: >=1.03 (ref 1–1.03)
UROBILINOGEN, URINE: 1 E.U./DL
WBC # BLD: 9.5 E9/L (ref 4.5–11.5)
WBC UA: ABNORMAL /HPF (ref 0–5)

## 2020-02-25 PROCEDURE — 6360000002 HC RX W HCPCS: Performed by: PHYSICIAN ASSISTANT

## 2020-02-25 PROCEDURE — 81001 URINALYSIS AUTO W/SCOPE: CPT

## 2020-02-25 PROCEDURE — 87040 BLOOD CULTURE FOR BACTERIA: CPT

## 2020-02-25 PROCEDURE — 87186 SC STD MICRODIL/AGAR DIL: CPT

## 2020-02-25 PROCEDURE — 80048 BASIC METABOLIC PNL TOTAL CA: CPT

## 2020-02-25 PROCEDURE — 85025 COMPLETE CBC W/AUTO DIFF WBC: CPT

## 2020-02-25 PROCEDURE — 96365 THER/PROPH/DIAG IV INF INIT: CPT

## 2020-02-25 PROCEDURE — 87088 URINE BACTERIA CULTURE: CPT

## 2020-02-25 PROCEDURE — 87077 CULTURE AEROBIC IDENTIFY: CPT

## 2020-02-25 PROCEDURE — 2580000003 HC RX 258: Performed by: PHYSICIAN ASSISTANT

## 2020-02-25 RX ORDER — CEFDINIR 300 MG/1
300 CAPSULE ORAL 2 TIMES DAILY
Qty: 20 CAPSULE | Refills: 0 | Status: SHIPPED | OUTPATIENT
Start: 2020-02-25 | End: 2020-03-06

## 2020-02-25 RX ADMIN — CEFTRIAXONE 1 G: 1 INJECTION, POWDER, FOR SOLUTION INTRAMUSCULAR; INTRAVENOUS at 01:53

## 2020-02-25 RX ADMIN — SODIUM CHLORIDE 1000 ML: 9 INJECTION, SOLUTION INTRAVENOUS at 00:39

## 2020-02-27 LAB
ORGANISM: ABNORMAL
URINE CULTURE, ROUTINE: ABNORMAL
URINE CULTURE, ROUTINE: ABNORMAL

## 2020-03-01 LAB
BLOOD CULTURE, ROUTINE: NORMAL
CULTURE, BLOOD 2: NORMAL

## 2021-01-01 ENCOUNTER — APPOINTMENT (OUTPATIENT)
Dept: GENERAL RADIOLOGY | Age: 71
DRG: 207 | End: 2021-01-01
Attending: INTERNAL MEDICINE
Payer: MEDICARE

## 2021-01-01 ENCOUNTER — ANESTHESIA (OUTPATIENT)
Dept: ICU | Age: 71
DRG: 207 | End: 2021-01-01
Payer: MEDICARE

## 2021-01-01 ENCOUNTER — APPOINTMENT (OUTPATIENT)
Dept: ULTRASOUND IMAGING | Age: 71
DRG: 207 | End: 2021-01-01
Attending: INTERNAL MEDICINE
Payer: MEDICARE

## 2021-01-01 ENCOUNTER — APPOINTMENT (OUTPATIENT)
Dept: GENERAL RADIOLOGY | Age: 71
End: 2021-01-01
Payer: MEDICARE

## 2021-01-01 ENCOUNTER — HOSPITAL ENCOUNTER (EMERGENCY)
Age: 71
Discharge: ANOTHER HOSPITAL | End: 2021-08-30
Attending: EMERGENCY MEDICINE
Payer: MEDICARE

## 2021-01-01 ENCOUNTER — HOSPITAL ENCOUNTER (INPATIENT)
Age: 71
LOS: 20 days | DRG: 207 | End: 2021-09-19
Attending: INTERNAL MEDICINE | Admitting: INTERNAL MEDICINE
Payer: MEDICARE

## 2021-01-01 ENCOUNTER — ANESTHESIA EVENT (OUTPATIENT)
Dept: ICU | Age: 71
DRG: 207 | End: 2021-01-01
Payer: MEDICARE

## 2021-01-01 ENCOUNTER — APPOINTMENT (OUTPATIENT)
Dept: CT IMAGING | Age: 71
DRG: 207 | End: 2021-01-01
Attending: INTERNAL MEDICINE
Payer: MEDICARE

## 2021-01-01 VITALS
OXYGEN SATURATION: 94 % | DIASTOLIC BLOOD PRESSURE: 69 MMHG | SYSTOLIC BLOOD PRESSURE: 132 MMHG | TEMPERATURE: 98.3 F | RESPIRATION RATE: 16 BRPM | HEART RATE: 82 BPM | WEIGHT: 213 LBS | BODY MASS INDEX: 32.39 KG/M2

## 2021-01-01 VITALS
BODY MASS INDEX: 33.55 KG/M2 | OXYGEN SATURATION: 47 % | DIASTOLIC BLOOD PRESSURE: 51 MMHG | HEIGHT: 68 IN | WEIGHT: 221.34 LBS | TEMPERATURE: 99.9 F | SYSTOLIC BLOOD PRESSURE: 93 MMHG

## 2021-01-01 DIAGNOSIS — J96.01 ACUTE RESPIRATORY FAILURE WITH HYPOXIA (HCC): ICD-10-CM

## 2021-01-01 DIAGNOSIS — U07.1 COVID-19: Primary | ICD-10-CM

## 2021-01-01 LAB
AADO2: 413.7 MMHG
AADO2: 422.2 MMHG
AADO2: 530.9 MMHG
AADO2: 564 MMHG
AADO2: 565.4 MMHG
AADO2: 576.6 MMHG
AADO2: 577.5 MMHG
AADO2: 587.5 MMHG
AADO2: 591.9 MMHG
AADO2: 597.6 MMHG
AADO2: 602.6 MMHG
AADO2: 610.7 MMHG
ALBUMIN SERPL-MCNC: 2.2 G/DL (ref 3.5–5.2)
ALBUMIN SERPL-MCNC: 2.2 G/DL (ref 3.5–5.2)
ALBUMIN SERPL-MCNC: 2.3 G/DL (ref 3.5–5.2)
ALBUMIN SERPL-MCNC: 2.6 G/DL (ref 3.5–5.2)
ALBUMIN SERPL-MCNC: 2.7 G/DL (ref 3.5–5.2)
ALBUMIN SERPL-MCNC: 2.7 G/DL (ref 3.5–5.2)
ALBUMIN SERPL-MCNC: 2.8 G/DL (ref 3.5–5.2)
ALBUMIN SERPL-MCNC: 2.8 G/DL (ref 3.5–5.2)
ALBUMIN SERPL-MCNC: 3.1 G/DL (ref 3.5–5.2)
ALBUMIN SERPL-MCNC: 3.2 G/DL (ref 3.5–5.2)
ALBUMIN SERPL-MCNC: 3.3 G/DL (ref 3.5–5.2)
ALBUMIN SERPL-MCNC: 3.5 G/DL (ref 3.5–5.2)
ALBUMIN SERPL-MCNC: 3.5 G/DL (ref 3.5–5.2)
ALBUMIN SERPL-MCNC: 3.6 G/DL (ref 3.5–5.2)
ALP BLD-CCNC: 112 U/L (ref 35–104)
ALP BLD-CCNC: 146 U/L (ref 35–104)
ALP BLD-CCNC: 63 U/L (ref 35–104)
ALP BLD-CCNC: 65 U/L (ref 35–104)
ALP BLD-CCNC: 66 U/L (ref 35–104)
ALP BLD-CCNC: 69 U/L (ref 35–104)
ALP BLD-CCNC: 70 U/L (ref 35–104)
ALP BLD-CCNC: 71 U/L (ref 35–104)
ALP BLD-CCNC: 71 U/L (ref 35–104)
ALP BLD-CCNC: 75 U/L (ref 35–104)
ALP BLD-CCNC: 76 U/L (ref 35–104)
ALP BLD-CCNC: 77 U/L (ref 35–104)
ALP BLD-CCNC: 77 U/L (ref 35–104)
ALP BLD-CCNC: 82 U/L (ref 35–104)
ALP BLD-CCNC: 89 U/L (ref 35–104)
ALT SERPL-CCNC: 187 U/L (ref 0–32)
ALT SERPL-CCNC: 192 U/L (ref 0–32)
ALT SERPL-CCNC: 21 U/L (ref 0–32)
ALT SERPL-CCNC: 22 U/L (ref 0–32)
ALT SERPL-CCNC: 22 U/L (ref 0–32)
ALT SERPL-CCNC: 23 U/L (ref 0–32)
ALT SERPL-CCNC: 24 U/L (ref 0–32)
ALT SERPL-CCNC: 26 U/L (ref 0–32)
ALT SERPL-CCNC: 30 U/L (ref 0–32)
ALT SERPL-CCNC: 34 U/L (ref 0–32)
ALT SERPL-CCNC: 37 U/L (ref 0–32)
ALT SERPL-CCNC: 40 U/L (ref 0–32)
ALT SERPL-CCNC: 58 U/L (ref 0–32)
ALT SERPL-CCNC: 61 U/L (ref 0–32)
ALT SERPL-CCNC: 84 U/L (ref 0–32)
ANION GAP SERPL CALCULATED.3IONS-SCNC: 10 MMOL/L (ref 7–16)
ANION GAP SERPL CALCULATED.3IONS-SCNC: 11 MMOL/L (ref 7–16)
ANION GAP SERPL CALCULATED.3IONS-SCNC: 11 MMOL/L (ref 7–16)
ANION GAP SERPL CALCULATED.3IONS-SCNC: 12 MMOL/L (ref 7–16)
ANION GAP SERPL CALCULATED.3IONS-SCNC: 12 MMOL/L (ref 7–16)
ANION GAP SERPL CALCULATED.3IONS-SCNC: 5 MMOL/L (ref 7–16)
ANION GAP SERPL CALCULATED.3IONS-SCNC: 5 MMOL/L (ref 7–16)
ANION GAP SERPL CALCULATED.3IONS-SCNC: 6 MMOL/L (ref 7–16)
ANION GAP SERPL CALCULATED.3IONS-SCNC: 7 MMOL/L (ref 7–16)
ANION GAP SERPL CALCULATED.3IONS-SCNC: 8 MMOL/L (ref 7–16)
ANION GAP SERPL CALCULATED.3IONS-SCNC: 9 MMOL/L (ref 7–16)
ANION GAP SERPL CALCULATED.3IONS-SCNC: 9 MMOL/L (ref 7–16)
ANISOCYTOSIS: ABNORMAL
AST SERPL-CCNC: 19 U/L (ref 0–31)
AST SERPL-CCNC: 20 U/L (ref 0–31)
AST SERPL-CCNC: 22 U/L (ref 0–31)
AST SERPL-CCNC: 23 U/L (ref 0–31)
AST SERPL-CCNC: 24 U/L (ref 0–31)
AST SERPL-CCNC: 28 U/L (ref 0–31)
AST SERPL-CCNC: 31 U/L (ref 0–31)
AST SERPL-CCNC: 36 U/L (ref 0–31)
AST SERPL-CCNC: 39 U/L (ref 0–31)
AST SERPL-CCNC: 40 U/L (ref 0–31)
AST SERPL-CCNC: 43 U/L (ref 0–31)
AST SERPL-CCNC: 49 U/L (ref 0–31)
AST SERPL-CCNC: 67 U/L (ref 0–31)
AST SERPL-CCNC: 80 U/L (ref 0–31)
AST SERPL-CCNC: 93 U/L (ref 0–31)
B.E.: -0.1 MMOL/L (ref -3–3)
B.E.: -0.4 MMOL/L (ref -3–0)
B.E.: -1.5 MMOL/L (ref -3–0)
B.E.: -1.9 MMOL/L (ref -3–3)
B.E.: -2.1 MMOL/L (ref -3–3)
B.E.: -2.2 MMOL/L (ref -3–3)
B.E.: -2.8 MMOL/L (ref -3–3)
B.E.: -3.2 MMOL/L (ref -3–3)
B.E.: -3.5 MMOL/L (ref -3–3)
B.E.: -6.5 MMOL/L (ref -3–3)
B.E.: 0.2 MMOL/L (ref -3–3)
B.E.: 0.4 MMOL/L (ref -3–3)
B.E.: 0.6 MMOL/L (ref -3–3)
B.E.: 0.9 MMOL/L (ref -3–3)
B.E.: 2.8 MMOL/L (ref -3–3)
BASOPHILIC STIPPLING: ABNORMAL
BASOPHILS ABSOLUTE: 0 E9/L (ref 0–0.2)
BASOPHILS ABSOLUTE: 0.01 E9/L (ref 0–0.2)
BASOPHILS ABSOLUTE: 0.02 E9/L (ref 0–0.2)
BASOPHILS ABSOLUTE: 0.02 E9/L (ref 0–0.2)
BASOPHILS ABSOLUTE: 0.03 E9/L (ref 0–0.2)
BASOPHILS ABSOLUTE: 0.04 E9/L (ref 0–0.2)
BASOPHILS ABSOLUTE: 0.05 E9/L (ref 0–0.2)
BASOPHILS ABSOLUTE: 0.06 E9/L (ref 0–0.2)
BASOPHILS RELATIVE PERCENT: 0 % (ref 0–2)
BASOPHILS RELATIVE PERCENT: 0.1 % (ref 0–2)
BASOPHILS RELATIVE PERCENT: 0.2 % (ref 0–2)
BILIRUB SERPL-MCNC: 0.2 MG/DL (ref 0–1.2)
BILIRUB SERPL-MCNC: 0.3 MG/DL (ref 0–1.2)
BILIRUB SERPL-MCNC: 0.4 MG/DL (ref 0–1.2)
BILIRUB SERPL-MCNC: 0.5 MG/DL (ref 0–1.2)
BILIRUB SERPL-MCNC: 0.5 MG/DL (ref 0–1.2)
BILIRUB SERPL-MCNC: 0.6 MG/DL (ref 0–1.2)
BILIRUB SERPL-MCNC: 0.7 MG/DL (ref 0–1.2)
BILIRUB SERPL-MCNC: <0.2 MG/DL (ref 0–1.2)
BLOOD CULTURE, ROUTINE: NORMAL
BUN BLDV-MCNC: 104 MG/DL (ref 6–23)
BUN BLDV-MCNC: 18 MG/DL (ref 6–23)
BUN BLDV-MCNC: 18 MG/DL (ref 6–23)
BUN BLDV-MCNC: 23 MG/DL (ref 6–23)
BUN BLDV-MCNC: 25 MG/DL (ref 6–23)
BUN BLDV-MCNC: 25 MG/DL (ref 6–23)
BUN BLDV-MCNC: 26 MG/DL (ref 6–23)
BUN BLDV-MCNC: 26 MG/DL (ref 6–23)
BUN BLDV-MCNC: 27 MG/DL (ref 6–23)
BUN BLDV-MCNC: 29 MG/DL (ref 6–23)
BUN BLDV-MCNC: 29 MG/DL (ref 6–23)
BUN BLDV-MCNC: 31 MG/DL (ref 6–23)
BUN BLDV-MCNC: 35 MG/DL (ref 6–23)
BUN BLDV-MCNC: 35 MG/DL (ref 6–23)
BUN BLDV-MCNC: 46 MG/DL (ref 6–23)
BUN BLDV-MCNC: 52 MG/DL (ref 6–23)
BUN BLDV-MCNC: 63 MG/DL (ref 6–23)
BUN BLDV-MCNC: 69 MG/DL (ref 6–23)
BUN BLDV-MCNC: 69 MG/DL (ref 6–23)
BUN BLDV-MCNC: 70 MG/DL (ref 6–23)
BUN BLDV-MCNC: 72 MG/DL (ref 6–23)
BUN BLDV-MCNC: 97 MG/DL (ref 6–23)
C-REACTIVE PROTEIN: 0.8 MG/DL (ref 0–0.4)
C-REACTIVE PROTEIN: 1 MG/DL (ref 0–0.4)
C-REACTIVE PROTEIN: 1.4 MG/DL (ref 0–0.4)
C-REACTIVE PROTEIN: 10.8 MG/DL (ref 0–0.4)
C-REACTIVE PROTEIN: 11.8 MG/DL (ref 0–0.4)
C-REACTIVE PROTEIN: 12 MG/DL (ref 0–0.4)
C-REACTIVE PROTEIN: 13.1 MG/DL (ref 0–0.4)
C-REACTIVE PROTEIN: 14.9 MG/DL (ref 0–0.4)
C-REACTIVE PROTEIN: 3.4 MG/DL (ref 0–0.4)
C-REACTIVE PROTEIN: 4 MG/DL (ref 0–0.4)
C-REACTIVE PROTEIN: 4 MG/DL (ref 0–0.4)
C-REACTIVE PROTEIN: 4.1 MG/DL (ref 0–0.4)
C-REACTIVE PROTEIN: 6.2 MG/DL (ref 0–0.4)
C-REACTIVE PROTEIN: 7.1 MG/DL (ref 0–0.4)
C-REACTIVE PROTEIN: 8.2 MG/DL (ref 0–0.4)
CALCIUM SERPL-MCNC: 8.4 MG/DL (ref 8.6–10.2)
CALCIUM SERPL-MCNC: 8.6 MG/DL (ref 8.6–10.2)
CALCIUM SERPL-MCNC: 8.8 MG/DL (ref 8.6–10.2)
CALCIUM SERPL-MCNC: 8.9 MG/DL (ref 8.6–10.2)
CALCIUM SERPL-MCNC: 8.9 MG/DL (ref 8.6–10.2)
CALCIUM SERPL-MCNC: 9 MG/DL (ref 8.6–10.2)
CALCIUM SERPL-MCNC: 9.1 MG/DL (ref 8.6–10.2)
CALCIUM SERPL-MCNC: 9.2 MG/DL (ref 8.6–10.2)
CALCIUM SERPL-MCNC: 9.3 MG/DL (ref 8.6–10.2)
CALCIUM SERPL-MCNC: 9.4 MG/DL (ref 8.6–10.2)
CALCIUM SERPL-MCNC: 9.5 MG/DL (ref 8.6–10.2)
CALCIUM SERPL-MCNC: 9.5 MG/DL (ref 8.6–10.2)
CALCIUM SERPL-MCNC: 9.8 MG/DL (ref 8.6–10.2)
CHLORIDE BLD-SCNC: 100 MMOL/L (ref 98–107)
CHLORIDE BLD-SCNC: 101 MMOL/L (ref 98–107)
CHLORIDE BLD-SCNC: 102 MMOL/L (ref 98–107)
CHLORIDE BLD-SCNC: 103 MMOL/L (ref 98–107)
CHLORIDE BLD-SCNC: 104 MMOL/L (ref 98–107)
CHLORIDE BLD-SCNC: 105 MMOL/L (ref 98–107)
CHLORIDE BLD-SCNC: 105 MMOL/L (ref 98–107)
CHLORIDE BLD-SCNC: 106 MMOL/L (ref 98–107)
CHLORIDE BLD-SCNC: 107 MMOL/L (ref 98–107)
CHLORIDE BLD-SCNC: 97 MMOL/L (ref 98–107)
CHLORIDE BLD-SCNC: 99 MMOL/L (ref 98–107)
CO2: 21 MMOL/L (ref 22–29)
CO2: 22 MMOL/L (ref 22–29)
CO2: 23 MMOL/L (ref 22–29)
CO2: 24 MMOL/L (ref 22–29)
CO2: 25 MMOL/L (ref 22–29)
CO2: 26 MMOL/L (ref 22–29)
CO2: 27 MMOL/L (ref 22–29)
CO2: 27 MMOL/L (ref 22–29)
COHB: 0.1 % (ref 0–1.5)
COHB: 0.1 % (ref 0–1.5)
COHB: 0.2 % (ref 0–1.5)
COHB: 0.3 % (ref 0–1.5)
COHB: 0.4 % (ref 0–1.5)
COHB: 0.5 % (ref 0–1.5)
COHB: 0.5 % (ref 0–1.5)
COMMENT: ABNORMAL
CREAT SERPL-MCNC: 0.9 MG/DL (ref 0.5–1)
CREAT SERPL-MCNC: 1 MG/DL (ref 0.5–1)
CREAT SERPL-MCNC: 1.1 MG/DL (ref 0.5–1)
CREAT SERPL-MCNC: 1.2 MG/DL (ref 0.5–1)
CREAT SERPL-MCNC: 1.9 MG/DL (ref 0.5–1)
CREAT SERPL-MCNC: 2.2 MG/DL (ref 0.5–1)
CRITICAL: ABNORMAL
CULTURE, RESPIRATORY: NORMAL
D DIMER: 218 NG/ML DDU
D DIMER: 238 NG/ML DDU
D DIMER: 238 NG/ML DDU
D DIMER: 262 NG/ML DDU
D DIMER: 357 NG/ML DDU
D DIMER: 369 NG/ML DDU
D DIMER: 380 NG/ML DDU
D DIMER: 400 NG/ML DDU
D DIMER: 405 NG/ML DDU
D DIMER: 431 NG/ML DDU
D DIMER: 642 NG/ML DDU
D DIMER: 730 NG/ML DDU
D DIMER: <200 NG/ML DDU
DATE ANALYZED: ABNORMAL
DATE OF COLLECTION: ABNORMAL
DEVICE: ABNORMAL
DEVICE: ABNORMAL
DOHLE BODIES: ABNORMAL
EKG ATRIAL RATE: 90 BPM
EKG P AXIS: 9 DEGREES
EKG P-R INTERVAL: 150 MS
EKG Q-T INTERVAL: 378 MS
EKG QRS DURATION: 90 MS
EKG QTC CALCULATION (BAZETT): 462 MS
EKG R AXIS: -39 DEGREES
EKG T AXIS: 11 DEGREES
EKG VENTRICULAR RATE: 90 BPM
EOSINOPHILS ABSOLUTE: 0 E9/L (ref 0.05–0.5)
EOSINOPHILS ABSOLUTE: 0.08 E9/L (ref 0.05–0.5)
EOSINOPHILS RELATIVE PERCENT: 0 % (ref 0–6)
EOSINOPHILS RELATIVE PERCENT: 0.5 % (ref 0–6)
FERRITIN: 1099 NG/ML
FERRITIN: 409 NG/ML
FERRITIN: 409 NG/ML
FERRITIN: 431 NG/ML
FERRITIN: 443 NG/ML
FERRITIN: 591 NG/ML
FERRITIN: 833 NG/ML
FERRITIN: 833 NG/ML
FERRITIN: 902 NG/ML
FIO2 ARTERIAL: 100
FIO2 ARTERIAL: 100
FIO2: 100 %
FIO2: 75 %
FIO2: 80 %
FIO2: 95 %
GFR AFRICAN AMERICAN: 26
GFR AFRICAN AMERICAN: 31
GFR AFRICAN AMERICAN: 53
GFR AFRICAN AMERICAN: 59
GFR AFRICAN AMERICAN: >60
GFR NON-AFRICAN AMERICAN: 22 ML/MIN/1.73
GFR NON-AFRICAN AMERICAN: 26 ML/MIN/1.73
GFR NON-AFRICAN AMERICAN: 44 ML/MIN/1.73
GFR NON-AFRICAN AMERICAN: 49 ML/MIN/1.73
GFR NON-AFRICAN AMERICAN: 54 ML/MIN/1.73
GFR NON-AFRICAN AMERICAN: >60 ML/MIN/1.73
GLUCOSE BLD-MCNC: 153 MG/DL (ref 74–99)
GLUCOSE BLD-MCNC: 172 MG/DL (ref 74–99)
GLUCOSE BLD-MCNC: 195 MG/DL (ref 74–99)
GLUCOSE BLD-MCNC: 238 MG/DL (ref 74–99)
GLUCOSE BLD-MCNC: 243 MG/DL (ref 74–99)
GLUCOSE BLD-MCNC: 250 MG/DL (ref 74–99)
GLUCOSE BLD-MCNC: 251 MG/DL (ref 74–99)
GLUCOSE BLD-MCNC: 257 MG/DL (ref 74–99)
GLUCOSE BLD-MCNC: 260 MG/DL (ref 74–99)
GLUCOSE BLD-MCNC: 276 MG/DL (ref 74–99)
GLUCOSE BLD-MCNC: 286 MG/DL (ref 74–99)
GLUCOSE BLD-MCNC: 291 MG/DL (ref 74–99)
GLUCOSE BLD-MCNC: 299 MG/DL (ref 74–99)
GLUCOSE BLD-MCNC: 301 MG/DL (ref 74–99)
GLUCOSE BLD-MCNC: 302 MG/DL (ref 74–99)
GLUCOSE BLD-MCNC: 333 MG/DL (ref 74–99)
GLUCOSE BLD-MCNC: 335 MG/DL (ref 74–99)
GLUCOSE BLD-MCNC: 346 MG/DL (ref 74–99)
GLUCOSE BLD-MCNC: 347 MG/DL (ref 74–99)
GLUCOSE BLD-MCNC: 375 MG/DL (ref 74–99)
GLUCOSE BLD-MCNC: 384 MG/DL (ref 74–99)
GLUCOSE BLD-MCNC: 414 MG/DL (ref 74–99)
GRAM STAIN ORDERABLE: NORMAL
HBA1C MFR BLD: 7.4 % (ref 4–5.6)
HCO3 ARTERIAL: 27.5 MMOL/L (ref 22–26)
HCO3 ARTERIAL: 27.6 MMOL/L (ref 22–26)
HCO3: 22.2 MMOL/L (ref 22–26)
HCO3: 22.5 MMOL/L (ref 22–26)
HCO3: 22.6 MMOL/L (ref 22–26)
HCO3: 23 MMOL/L (ref 22–26)
HCO3: 23 MMOL/L (ref 22–26)
HCO3: 23.5 MMOL/L (ref 22–26)
HCO3: 23.5 MMOL/L (ref 22–26)
HCO3: 23.9 MMOL/L (ref 22–26)
HCO3: 25.2 MMOL/L (ref 22–26)
HCO3: 25.3 MMOL/L (ref 22–26)
HCO3: 25.5 MMOL/L (ref 22–26)
HCO3: 26.7 MMOL/L (ref 22–26)
HCO3: 28.4 MMOL/L (ref 22–26)
HCT VFR BLD CALC: 34.7 % (ref 34–48)
HCT VFR BLD CALC: 35.3 % (ref 34–48)
HCT VFR BLD CALC: 35.7 % (ref 34–48)
HCT VFR BLD CALC: 36.7 % (ref 34–48)
HCT VFR BLD CALC: 36.8 % (ref 34–48)
HCT VFR BLD CALC: 36.9 % (ref 34–48)
HCT VFR BLD CALC: 37.1 % (ref 34–48)
HCT VFR BLD CALC: 37.8 % (ref 34–48)
HCT VFR BLD CALC: 38.1 % (ref 34–48)
HCT VFR BLD CALC: 38.2 % (ref 34–48)
HCT VFR BLD CALC: 44.5 % (ref 34–48)
HEMOGLOBIN: 11.1 G/DL (ref 11.5–15.5)
HEMOGLOBIN: 11.3 G/DL (ref 11.5–15.5)
HEMOGLOBIN: 11.4 G/DL (ref 11.5–15.5)
HEMOGLOBIN: 11.8 G/DL (ref 11.5–15.5)
HEMOGLOBIN: 11.9 G/DL (ref 11.5–15.5)
HEMOGLOBIN: 12.2 G/DL (ref 11.5–15.5)
HEMOGLOBIN: 12.2 G/DL (ref 11.5–15.5)
HEMOGLOBIN: 12.3 G/DL (ref 11.5–15.5)
HEMOGLOBIN: 12.6 G/DL (ref 11.5–15.5)
HEMOGLOBIN: 12.9 G/DL (ref 11.5–15.5)
HEMOGLOBIN: 13.2 G/DL (ref 11.5–15.5)
HHB: 10.7 % (ref 0–5)
HHB: 14.7 % (ref 0–5)
HHB: 15.8 % (ref 0–5)
HHB: 17.5 % (ref 0–5)
HHB: 19.9 % (ref 0–5)
HHB: 35.5 % (ref 0–5)
HHB: 4.3 % (ref 0–5)
HHB: 5 % (ref 0–5)
HHB: 7.7 % (ref 0–5)
HHB: 8 % (ref 0–5)
HHB: 8.4 % (ref 0–5)
HHB: 8.6 % (ref 0–5)
HHB: 8.7 % (ref 0–5)
IMMATURE GRANULOCYTES #: 0.01 E9/L
IMMATURE GRANULOCYTES #: 0.02 E9/L
IMMATURE GRANULOCYTES #: 0.21 E9/L
IMMATURE GRANULOCYTES #: 0.24 E9/L
IMMATURE GRANULOCYTES #: 0.44 E9/L
IMMATURE GRANULOCYTES #: 0.5 E9/L
IMMATURE GRANULOCYTES #: 0.53 E9/L
IMMATURE GRANULOCYTES #: 0.89 E9/L
IMMATURE GRANULOCYTES #: 1.18 E9/L
IMMATURE GRANULOCYTES %: 0.2 % (ref 0–5)
IMMATURE GRANULOCYTES %: 0.5 % (ref 0–5)
IMMATURE GRANULOCYTES %: 1.4 % (ref 0–5)
IMMATURE GRANULOCYTES %: 1.6 % (ref 0–5)
IMMATURE GRANULOCYTES %: 2.1 % (ref 0–5)
IMMATURE GRANULOCYTES %: 2.7 % (ref 0–5)
IMMATURE GRANULOCYTES %: 2.7 % (ref 0–5)
IMMATURE GRANULOCYTES %: 3.1 % (ref 0–5)
IMMATURE GRANULOCYTES %: 3.9 % (ref 0–5)
L. PNEUMOPHILA SEROGP 1 UR AG: NORMAL
LAB: ABNORMAL
LACTATE DEHYDROGENASE: 239 U/L (ref 135–214)
LACTATE DEHYDROGENASE: 246 U/L (ref 135–214)
LACTATE DEHYDROGENASE: 255 U/L (ref 135–214)
LACTATE DEHYDROGENASE: 258 U/L (ref 135–214)
LACTATE DEHYDROGENASE: 264 U/L (ref 135–214)
LACTATE DEHYDROGENASE: 277 U/L (ref 135–214)
LACTATE DEHYDROGENASE: 306 U/L (ref 135–214)
LACTATE DEHYDROGENASE: 310 U/L (ref 135–214)
LACTATE DEHYDROGENASE: 349 U/L (ref 135–214)
LACTATE DEHYDROGENASE: 398 U/L (ref 135–214)
LACTATE DEHYDROGENASE: 424 U/L (ref 135–214)
LACTATE DEHYDROGENASE: 636 U/L (ref 135–214)
LACTIC ACID, SEPSIS: 1.7 MMOL/L (ref 0.5–1.9)
LYMPHOCYTES ABSOLUTE: 0.61 E9/L (ref 1.5–4)
LYMPHOCYTES ABSOLUTE: 0.63 E9/L (ref 1.5–4)
LYMPHOCYTES ABSOLUTE: 0.68 E9/L (ref 1.5–4)
LYMPHOCYTES ABSOLUTE: 0.7 E9/L (ref 1.5–4)
LYMPHOCYTES ABSOLUTE: 0.71 E9/L (ref 1.5–4)
LYMPHOCYTES ABSOLUTE: 0.72 E9/L (ref 1.5–4)
LYMPHOCYTES ABSOLUTE: 0.75 E9/L (ref 1.5–4)
LYMPHOCYTES ABSOLUTE: 0.81 E9/L (ref 1.5–4)
LYMPHOCYTES ABSOLUTE: 0.83 E9/L (ref 1.5–4)
LYMPHOCYTES RELATIVE PERCENT: 17.2 % (ref 20–42)
LYMPHOCYTES RELATIVE PERCENT: 2 % (ref 20–42)
LYMPHOCYTES RELATIVE PERCENT: 2.4 % (ref 20–42)
LYMPHOCYTES RELATIVE PERCENT: 2.5 % (ref 20–42)
LYMPHOCYTES RELATIVE PERCENT: 2.6 % (ref 20–42)
LYMPHOCYTES RELATIVE PERCENT: 2.6 % (ref 20–42)
LYMPHOCYTES RELATIVE PERCENT: 20.6 % (ref 20–42)
LYMPHOCYTES RELATIVE PERCENT: 3.2 % (ref 20–42)
LYMPHOCYTES RELATIVE PERCENT: 4.1 % (ref 20–42)
LYMPHOCYTES RELATIVE PERCENT: 4.6 % (ref 20–42)
LYMPHOCYTES RELATIVE PERCENT: 5.2 % (ref 20–42)
Lab: ABNORMAL
MAGNESIUM: 2.1 MG/DL (ref 1.6–2.6)
MAGNESIUM: 2.3 MG/DL (ref 1.6–2.6)
MAGNESIUM: 2.6 MG/DL (ref 1.6–2.6)
MAGNESIUM: 2.8 MG/DL (ref 1.6–2.6)
MAGNESIUM: 2.9 MG/DL (ref 1.6–2.6)
MAGNESIUM: 3.4 MG/DL (ref 1.6–2.6)
MCH RBC QN AUTO: 30.1 PG (ref 26–35)
MCH RBC QN AUTO: 30.2 PG (ref 26–35)
MCH RBC QN AUTO: 30.4 PG (ref 26–35)
MCH RBC QN AUTO: 30.5 PG (ref 26–35)
MCH RBC QN AUTO: 30.5 PG (ref 26–35)
MCH RBC QN AUTO: 30.7 PG (ref 26–35)
MCH RBC QN AUTO: 30.8 PG (ref 26–35)
MCH RBC QN AUTO: 31.1 PG (ref 26–35)
MCH RBC QN AUTO: 31.6 PG (ref 26–35)
MCHC RBC AUTO-ENTMCNC: 29 % (ref 32–34.5)
MCHC RBC AUTO-ENTMCNC: 30.8 % (ref 32–34.5)
MCHC RBC AUTO-ENTMCNC: 31.4 % (ref 32–34.5)
MCHC RBC AUTO-ENTMCNC: 31.9 % (ref 32–34.5)
MCHC RBC AUTO-ENTMCNC: 32.2 % (ref 32–34.5)
MCHC RBC AUTO-ENTMCNC: 32.5 % (ref 32–34.5)
MCHC RBC AUTO-ENTMCNC: 32.9 % (ref 32–34.5)
MCHC RBC AUTO-ENTMCNC: 33.1 % (ref 32–34.5)
MCHC RBC AUTO-ENTMCNC: 33.2 % (ref 32–34.5)
MCHC RBC AUTO-ENTMCNC: 34 % (ref 32–34.5)
MCHC RBC AUTO-ENTMCNC: 34.6 % (ref 32–34.5)
MCV RBC AUTO: 104.2 FL (ref 80–99.9)
MCV RBC AUTO: 89.9 FL (ref 80–99.9)
MCV RBC AUTO: 92 FL (ref 80–99.9)
MCV RBC AUTO: 92.7 FL (ref 80–99.9)
MCV RBC AUTO: 93 FL (ref 80–99.9)
MCV RBC AUTO: 93.4 FL (ref 80–99.9)
MCV RBC AUTO: 93.8 FL (ref 80–99.9)
MCV RBC AUTO: 94.7 FL (ref 80–99.9)
MCV RBC AUTO: 95.5 FL (ref 80–99.9)
MCV RBC AUTO: 98.1 FL (ref 80–99.9)
MCV RBC AUTO: 99.2 FL (ref 80–99.9)
METAMYELOCYTES RELATIVE PERCENT: 0.9 % (ref 0–1)
METER GLUCOSE: 109 MG/DL (ref 74–99)
METER GLUCOSE: 117 MG/DL (ref 74–99)
METER GLUCOSE: 145 MG/DL (ref 74–99)
METER GLUCOSE: 169 MG/DL (ref 74–99)
METER GLUCOSE: 182 MG/DL (ref 74–99)
METER GLUCOSE: 183 MG/DL (ref 74–99)
METER GLUCOSE: 194 MG/DL (ref 74–99)
METER GLUCOSE: 200 MG/DL (ref 74–99)
METER GLUCOSE: 208 MG/DL (ref 74–99)
METER GLUCOSE: 210 MG/DL (ref 74–99)
METER GLUCOSE: 212 MG/DL (ref 74–99)
METER GLUCOSE: 214 MG/DL (ref 74–99)
METER GLUCOSE: 216 MG/DL (ref 74–99)
METER GLUCOSE: 218 MG/DL (ref 74–99)
METER GLUCOSE: 219 MG/DL (ref 74–99)
METER GLUCOSE: 220 MG/DL (ref 74–99)
METER GLUCOSE: 225 MG/DL (ref 74–99)
METER GLUCOSE: 226 MG/DL (ref 74–99)
METER GLUCOSE: 229 MG/DL (ref 74–99)
METER GLUCOSE: 229 MG/DL (ref 74–99)
METER GLUCOSE: 231 MG/DL (ref 74–99)
METER GLUCOSE: 231 MG/DL (ref 74–99)
METER GLUCOSE: 233 MG/DL (ref 74–99)
METER GLUCOSE: 234 MG/DL (ref 74–99)
METER GLUCOSE: 235 MG/DL (ref 74–99)
METER GLUCOSE: 240 MG/DL (ref 74–99)
METER GLUCOSE: 241 MG/DL (ref 74–99)
METER GLUCOSE: 246 MG/DL (ref 74–99)
METER GLUCOSE: 248 MG/DL (ref 74–99)
METER GLUCOSE: 252 MG/DL (ref 74–99)
METER GLUCOSE: 257 MG/DL (ref 74–99)
METER GLUCOSE: 258 MG/DL (ref 74–99)
METER GLUCOSE: 262 MG/DL (ref 74–99)
METER GLUCOSE: 264 MG/DL (ref 74–99)
METER GLUCOSE: 271 MG/DL (ref 74–99)
METER GLUCOSE: 279 MG/DL (ref 74–99)
METER GLUCOSE: 280 MG/DL (ref 74–99)
METER GLUCOSE: 281 MG/DL (ref 74–99)
METER GLUCOSE: 282 MG/DL (ref 74–99)
METER GLUCOSE: 291 MG/DL (ref 74–99)
METER GLUCOSE: 292 MG/DL (ref 74–99)
METER GLUCOSE: 294 MG/DL (ref 74–99)
METER GLUCOSE: 294 MG/DL (ref 74–99)
METER GLUCOSE: 297 MG/DL (ref 74–99)
METER GLUCOSE: 298 MG/DL (ref 74–99)
METER GLUCOSE: 300 MG/DL (ref 74–99)
METER GLUCOSE: 302 MG/DL (ref 74–99)
METER GLUCOSE: 306 MG/DL (ref 74–99)
METER GLUCOSE: 308 MG/DL (ref 74–99)
METER GLUCOSE: 309 MG/DL (ref 74–99)
METER GLUCOSE: 312 MG/DL (ref 74–99)
METER GLUCOSE: 313 MG/DL (ref 74–99)
METER GLUCOSE: 317 MG/DL (ref 74–99)
METER GLUCOSE: 317 MG/DL (ref 74–99)
METER GLUCOSE: 319 MG/DL (ref 74–99)
METER GLUCOSE: 321 MG/DL (ref 74–99)
METER GLUCOSE: 324 MG/DL (ref 74–99)
METER GLUCOSE: 325 MG/DL (ref 74–99)
METER GLUCOSE: 328 MG/DL (ref 74–99)
METER GLUCOSE: 330 MG/DL (ref 74–99)
METER GLUCOSE: 331 MG/DL (ref 74–99)
METER GLUCOSE: 333 MG/DL (ref 74–99)
METER GLUCOSE: 338 MG/DL (ref 74–99)
METER GLUCOSE: 339 MG/DL (ref 74–99)
METER GLUCOSE: 345 MG/DL (ref 74–99)
METER GLUCOSE: 346 MG/DL (ref 74–99)
METER GLUCOSE: 347 MG/DL (ref 74–99)
METER GLUCOSE: 350 MG/DL (ref 74–99)
METER GLUCOSE: 350 MG/DL (ref 74–99)
METER GLUCOSE: 352 MG/DL (ref 74–99)
METER GLUCOSE: 354 MG/DL (ref 74–99)
METER GLUCOSE: 356 MG/DL (ref 74–99)
METER GLUCOSE: 356 MG/DL (ref 74–99)
METER GLUCOSE: 358 MG/DL (ref 74–99)
METER GLUCOSE: 360 MG/DL (ref 74–99)
METER GLUCOSE: 363 MG/DL (ref 74–99)
METER GLUCOSE: 366 MG/DL (ref 74–99)
METER GLUCOSE: 372 MG/DL (ref 74–99)
METER GLUCOSE: 387 MG/DL (ref 74–99)
METER GLUCOSE: 391 MG/DL (ref 74–99)
METER GLUCOSE: 411 MG/DL (ref 74–99)
METER GLUCOSE: 92 MG/DL (ref 74–99)
METHB: 0.1 % (ref 0–1.5)
METHB: 0.2 % (ref 0–1.5)
METHB: 0.3 % (ref 0–1.5)
METHB: 0.3 % (ref 0–1.5)
MODE: ABNORMAL
MODE: AC
MONOCYTES ABSOLUTE: 0.25 E9/L (ref 0.1–0.95)
MONOCYTES ABSOLUTE: 0.37 E9/L (ref 0.1–0.95)
MONOCYTES ABSOLUTE: 0.42 E9/L (ref 0.1–0.95)
MONOCYTES ABSOLUTE: 0.93 E9/L (ref 0.1–0.95)
MONOCYTES ABSOLUTE: 0.94 E9/L (ref 0.1–0.95)
MONOCYTES ABSOLUTE: 1.01 E9/L (ref 0.1–0.95)
MONOCYTES ABSOLUTE: 1.76 E9/L (ref 0.1–0.95)
MONOCYTES ABSOLUTE: 1.83 E9/L (ref 0.1–0.95)
MONOCYTES ABSOLUTE: 1.95 E9/L (ref 0.1–0.95)
MONOCYTES ABSOLUTE: 2.26 E9/L (ref 0.1–0.95)
MONOCYTES ABSOLUTE: 2.7 E9/L (ref 0.1–0.95)
MONOCYTES RELATIVE PERCENT: 2.7 % (ref 2–12)
MONOCYTES RELATIVE PERCENT: 4.3 % (ref 2–12)
MONOCYTES RELATIVE PERCENT: 5.8 % (ref 2–12)
MONOCYTES RELATIVE PERCENT: 6.1 % (ref 2–12)
MONOCYTES RELATIVE PERCENT: 6.1 % (ref 2–12)
MONOCYTES RELATIVE PERCENT: 6.3 % (ref 2–12)
MONOCYTES RELATIVE PERCENT: 8 % (ref 2–12)
MONOCYTES RELATIVE PERCENT: 8 % (ref 2–12)
MONOCYTES RELATIVE PERCENT: 8.1 % (ref 2–12)
MONOCYTES RELATIVE PERCENT: 9.2 % (ref 2–12)
MONOCYTES RELATIVE PERCENT: 9.2 % (ref 2–12)
MRSA CULTURE ONLY: NORMAL
MYELOCYTE PERCENT: 0.9 % (ref 0–0)
MYELOCYTE PERCENT: 1 % (ref 0–0)
NEUTROPHILS ABSOLUTE: 12.99 E9/L (ref 1.8–7.3)
NEUTROPHILS ABSOLUTE: 13.87 E9/L (ref 1.8–7.3)
NEUTROPHILS ABSOLUTE: 14.41 E9/L (ref 1.8–7.3)
NEUTROPHILS ABSOLUTE: 16.84 E9/L (ref 1.8–7.3)
NEUTROPHILS ABSOLUTE: 2.81 E9/L (ref 1.8–7.3)
NEUTROPHILS ABSOLUTE: 21.1 E9/L (ref 1.8–7.3)
NEUTROPHILS ABSOLUTE: 21.67 E9/L (ref 1.8–7.3)
NEUTROPHILS ABSOLUTE: 24.37 E9/L (ref 1.8–7.3)
NEUTROPHILS ABSOLUTE: 26.38 E9/L (ref 1.8–7.3)
NEUTROPHILS ABSOLUTE: 3.14 E9/L (ref 1.8–7.3)
NEUTROPHILS ABSOLUTE: 30.42 E9/L (ref 1.8–7.3)
NEUTROPHILS RELATIVE PERCENT: 70 % (ref 43–80)
NEUTROPHILS RELATIVE PERCENT: 76.2 % (ref 43–80)
NEUTROPHILS RELATIVE PERCENT: 84.7 % (ref 43–80)
NEUTROPHILS RELATIVE PERCENT: 86.1 % (ref 43–80)
NEUTROPHILS RELATIVE PERCENT: 87 % (ref 43–80)
NEUTROPHILS RELATIVE PERCENT: 87.1 % (ref 43–80)
NEUTROPHILS RELATIVE PERCENT: 87.6 % (ref 43–80)
NEUTROPHILS RELATIVE PERCENT: 87.7 % (ref 43–80)
NEUTROPHILS RELATIVE PERCENT: 89 % (ref 43–80)
NEUTROPHILS RELATIVE PERCENT: 89.9 % (ref 43–80)
NEUTROPHILS RELATIVE PERCENT: 91.3 % (ref 43–80)
NUCLEATED RED BLOOD CELLS: 0 /100 WBC
O2 CONTENT: 12.6 ML/DL
O2 CONTENT: 13.5 ML/DL
O2 CONTENT: 15.4 ML/DL
O2 CONTENT: 15.5 ML/DL
O2 CONTENT: 15.5 ML/DL
O2 CONTENT: 15.7 ML/DL
O2 CONTENT: 15.8 ML/DL
O2 CONTENT: 16.4 ML/DL
O2 CONTENT: 16.7 ML/DL
O2 CONTENT: 17.1 ML/DL
O2 CONTENT: 17.2 ML/DL
O2 CONTENT: 17.6 ML/DL
O2 CONTENT: 17.6 ML/DL
O2 SATURATION: 64.2 % (ref 92–98.5)
O2 SATURATION: 80 % (ref 92–98.5)
O2 SATURATION: 82.1 % (ref 92–98.5)
O2 SATURATION: 82.4 % (ref 92–98.5)
O2 SATURATION: 84.1 % (ref 92–98.5)
O2 SATURATION: 85.2 % (ref 92–98.5)
O2 SATURATION: 87 % (ref 92–98.5)
O2 SATURATION: 89.2 % (ref 92–98.5)
O2 SATURATION: 91.3 % (ref 92–98.5)
O2 SATURATION: 91.3 % (ref 92–98.5)
O2 SATURATION: 91.6 % (ref 92–98.5)
O2 SATURATION: 92 % (ref 92–98.5)
O2 SATURATION: 92.3 % (ref 92–98.5)
O2 SATURATION: 95 % (ref 92–98.5)
O2 SATURATION: 95.7 % (ref 92–98.5)
O2HB: 63.7 % (ref 94–97)
O2HB: 79.6 % (ref 94–97)
O2HB: 82.2 % (ref 94–97)
O2HB: 83.7 % (ref 94–97)
O2HB: 84.7 % (ref 94–97)
O2HB: 88.8 % (ref 94–97)
O2HB: 90.7 % (ref 94–97)
O2HB: 90.9 % (ref 94–97)
O2HB: 91.2 % (ref 94–97)
O2HB: 91.7 % (ref 94–97)
O2HB: 91.9 % (ref 94–97)
O2HB: 94.6 % (ref 94–97)
O2HB: 95.4 % (ref 94–97)
OPERATOR ID: 166
OPERATOR ID: 166
OPERATOR ID: 2485
OPERATOR ID: 410
OPERATOR ID: 46
OPERATOR ID: 5100
OPERATOR ID: 7296
OPERATOR ID: ABNORMAL
PATIENT TEMP: 37
PATIENT TEMP: 37 C
PCO2 (TEMP CORRECTED): 59.3 MMHG (ref 35–45)
PCO2 ARTERIAL: 66.6 MMHG (ref 35–45)
PCO2: 29.4 MMHG (ref 35–45)
PCO2: 29.9 MMHG (ref 35–45)
PCO2: 31.9 MMHG (ref 35–45)
PCO2: 33.2 MMHG (ref 35–45)
PCO2: 37.3 MMHG (ref 35–45)
PCO2: 42.7 MMHG (ref 35–45)
PCO2: 44.1 MMHG (ref 35–45)
PCO2: 47.4 MMHG (ref 35–45)
PCO2: 48.2 MMHG (ref 35–45)
PCO2: 49.2 MMHG (ref 35–45)
PCO2: 54.9 MMHG (ref 35–45)
PCO2: 64.1 MMHG (ref 35–45)
PCO2: 86.5 MMHG (ref 35–45)
PDW BLD-RTO: 12.1 FL (ref 11.5–15)
PDW BLD-RTO: 12.4 FL (ref 11.5–15)
PDW BLD-RTO: 12.5 FL (ref 11.5–15)
PDW BLD-RTO: 12.6 FL (ref 11.5–15)
PDW BLD-RTO: 12.8 FL (ref 11.5–15)
PDW BLD-RTO: 13 FL (ref 11.5–15)
PDW BLD-RTO: 13.3 FL (ref 11.5–15)
PDW BLD-RTO: 13.6 FL (ref 11.5–15)
PDW BLD-RTO: 13.7 FL (ref 11.5–15)
PEEP/CPAP: 12 CMH2O
PEEP/CPAP: 14 CMH2O
PFO2: 0.38 MMHG/%
PFO2: 0.45 MMHG/%
PFO2: 0.47 MMHG/%
PFO2: 0.49 MMHG/%
PFO2: 0.56 MMHG/%
PFO2: 0.61 MMHG/%
PFO2: 0.62 MMHG/%
PFO2: 0.63 MMHG/%
PFO2: 0.67 MMHG/%
PFO2: 0.68 MMHG/%
PFO2: 0.84 MMHG/%
PFO2: 1.02 MMHG/%
PH (TEMPERATURE CORRECTED): 7.28 (ref 7.35–7.45)
PH BLOOD GAS: 7.09 (ref 7.35–7.45)
PH BLOOD GAS: 7.22 (ref 7.35–7.45)
PH BLOOD GAS: 7.26 (ref 7.35–7.45)
PH BLOOD GAS: 7.28 (ref 7.35–7.45)
PH BLOOD GAS: 7.3 (ref 7.35–7.45)
PH BLOOD GAS: 7.3 (ref 7.35–7.45)
PH BLOOD GAS: 7.33 (ref 7.35–7.45)
PH BLOOD GAS: 7.34 (ref 7.35–7.45)
PH BLOOD GAS: 7.37 (ref 7.35–7.45)
PH BLOOD GAS: 7.4 (ref 7.35–7.45)
PH BLOOD GAS: 7.46 (ref 7.35–7.45)
PH BLOOD GAS: 7.5 (ref 7.35–7.45)
PH BLOOD GAS: 7.5 (ref 7.35–7.45)
PH BLOOD GAS: 7.51 (ref 7.35–7.45)
PHOSPHORUS: 2.6 MG/DL (ref 2.5–4.5)
PHOSPHORUS: 2.9 MG/DL (ref 2.5–4.5)
PHOSPHORUS: 3.1 MG/DL (ref 2.5–4.5)
PHOSPHORUS: 3.1 MG/DL (ref 2.5–4.5)
PHOSPHORUS: 3.3 MG/DL (ref 2.5–4.5)
PHOSPHORUS: 3.5 MG/DL (ref 2.5–4.5)
PHOSPHORUS: 5.1 MG/DL (ref 2.5–4.5)
PHOSPHORUS: 6 MG/DL (ref 2.5–4.5)
PLATELET # BLD: 137 E9/L (ref 130–450)
PLATELET # BLD: 154 E9/L (ref 130–450)
PLATELET # BLD: 220 E9/L (ref 130–450)
PLATELET # BLD: 228 E9/L (ref 130–450)
PLATELET # BLD: 233 E9/L (ref 130–450)
PLATELET # BLD: 269 E9/L (ref 130–450)
PLATELET # BLD: 271 E9/L (ref 130–450)
PLATELET # BLD: 293 E9/L (ref 130–450)
PLATELET # BLD: 301 E9/L (ref 130–450)
PLATELET # BLD: 331 E9/L (ref 130–450)
PLATELET # BLD: 339 E9/L (ref 130–450)
PMV BLD AUTO: 10.2 FL (ref 7–12)
PMV BLD AUTO: 10.3 FL (ref 7–12)
PMV BLD AUTO: 10.3 FL (ref 7–12)
PMV BLD AUTO: 10.5 FL (ref 7–12)
PMV BLD AUTO: 10.6 FL (ref 7–12)
PMV BLD AUTO: 10.6 FL (ref 7–12)
PMV BLD AUTO: 11.1 FL (ref 7–12)
PMV BLD AUTO: 11.4 FL (ref 7–12)
PMV BLD AUTO: 11.5 FL (ref 7–12)
PO2 (TEMP CORRECTED): 61.2 MMHG (ref 60–80)
PO2 ARTERIAL: 56.9 MMHG (ref 60–80)
PO2: 37.5 MMHG (ref 75–100)
PO2: 45.4 MMHG (ref 75–100)
PO2: 47.3 MMHG (ref 75–100)
PO2: 48.7 MMHG (ref 75–100)
PO2: 50 MMHG (ref 75–100)
PO2: 53.7 MMHG (ref 75–100)
PO2: 56 MMHG (ref 75–100)
PO2: 60.5 MMHG (ref 75–100)
PO2: 62.3 MMHG (ref 75–100)
PO2: 63.2 MMHG (ref 75–100)
PO2: 67.7 MMHG (ref 75–100)
PO2: 79.5 MMHG (ref 75–100)
PO2: 81.9 MMHG (ref 75–100)
POIKILOCYTES: ABNORMAL
POLYCHROMASIA: ABNORMAL
POLYCHROMASIA: ABNORMAL
POSITIVE END EXP PRESS: 12 CMH2O
POSITIVE END EXP PRESS: 12 CMH2O
POTASSIUM REFLEX MAGNESIUM: 4 MMOL/L (ref 3.5–5)
POTASSIUM REFLEX MAGNESIUM: 4.4 MMOL/L (ref 3.5–5)
POTASSIUM SERPL-SCNC: 3.9 MMOL/L (ref 3.5–5)
POTASSIUM SERPL-SCNC: 4.3 MMOL/L (ref 3.5–5)
POTASSIUM SERPL-SCNC: 4.5 MMOL/L (ref 3.5–5)
POTASSIUM SERPL-SCNC: 4.6 MMOL/L (ref 3.5–5)
POTASSIUM SERPL-SCNC: 4.7 MMOL/L (ref 3.5–5)
POTASSIUM SERPL-SCNC: 4.8 MMOL/L (ref 3.5–5)
POTASSIUM SERPL-SCNC: 5 MMOL/L (ref 3.5–5)
POTASSIUM SERPL-SCNC: 5.2 MMOL/L (ref 3.5–5)
POTASSIUM SERPL-SCNC: 5.4 MMOL/L (ref 3.5–5)
POTASSIUM SERPL-SCNC: 5.6 MMOL/L (ref 3.5–5)
POTASSIUM SERPL-SCNC: 6.2 MMOL/L (ref 3.5–5)
POTASSIUM SERPL-SCNC: 6.5 MMOL/L (ref 3.5–5)
POTASSIUM SERPL-SCNC: 6.7 MMOL/L (ref 3.5–5)
POTASSIUM SERPL-SCNC: 7.4 MMOL/L (ref 3.5–5)
PROCALCITONIN: 0.15 NG/ML (ref 0–0.08)
PROCALCITONIN: 0.23 NG/ML (ref 0–0.08)
PROCALCITONIN: 0.25 NG/ML (ref 0–0.08)
PROCALCITONIN: 0.3 NG/ML (ref 0–0.08)
RBC # BLD: 3.6 E12/L (ref 3.5–5.5)
RBC # BLD: 3.7 E12/L (ref 3.5–5.5)
RBC # BLD: 3.7 E12/L (ref 3.5–5.5)
RBC # BLD: 3.88 E12/L (ref 3.5–5.5)
RBC # BLD: 3.95 E12/L (ref 3.5–5.5)
RBC # BLD: 3.97 E12/L (ref 3.5–5.5)
RBC # BLD: 3.99 E12/L (ref 3.5–5.5)
RBC # BLD: 3.99 E12/L (ref 3.5–5.5)
RBC # BLD: 4 E12/L (ref 3.5–5.5)
RBC # BLD: 4.24 E12/L (ref 3.5–5.5)
RBC # BLD: 4.27 E12/L (ref 3.5–5.5)
RBC # BLD: NORMAL 10*6/UL
REASON FOR REJECTION: NORMAL
REJECTED TEST: NORMAL
RESPIRATORY RATE: 24 B/MIN
RESPIRATORY RATE: 24 B/MIN
RI(T): 1076 %
RI(T): 1215 %
RI(T): 1219 %
RI(T): 1345 %
RI(T): 1504 %
RI(T): 516 %
RI(T): 668 %
RI(T): 827 %
RI(T): 835 %
RI(T): 927 %
RI(T): 930 %
RI(T): 988 %
RR MECHANICAL: 18 B/MIN
RR MECHANICAL: 24 B/MIN
SARS-COV-2, NAAT: DETECTED
SEDIMENTATION RATE, ERYTHROCYTE: 21 MM/HR (ref 0–20)
SMEAR, RESPIRATORY: NORMAL
SODIUM BLD-SCNC: 129 MMOL/L (ref 132–146)
SODIUM BLD-SCNC: 133 MMOL/L (ref 132–146)
SODIUM BLD-SCNC: 134 MMOL/L (ref 132–146)
SODIUM BLD-SCNC: 135 MMOL/L (ref 132–146)
SODIUM BLD-SCNC: 135 MMOL/L (ref 132–146)
SODIUM BLD-SCNC: 136 MMOL/L (ref 132–146)
SODIUM BLD-SCNC: 136 MMOL/L (ref 132–146)
SODIUM BLD-SCNC: 137 MMOL/L (ref 132–146)
SODIUM BLD-SCNC: 138 MMOL/L (ref 132–146)
SODIUM BLD-SCNC: 139 MMOL/L (ref 132–146)
SODIUM BLD-SCNC: 139 MMOL/L (ref 132–146)
SOURCE, BLOOD GAS: ABNORMAL
STREP PNEUMONIAE ANTIGEN, URINE: NORMAL
TEAR DROP CELLS: ABNORMAL
THB: 12 G/DL (ref 11.5–16.5)
THB: 12.1 G/DL (ref 11.5–16.5)
THB: 12.2 G/DL (ref 11.5–16.5)
THB: 12.3 G/DL (ref 11.5–16.5)
THB: 12.7 G/DL (ref 11.5–16.5)
THB: 13.1 G/DL (ref 11.5–16.5)
THB: 13.1 G/DL (ref 11.5–16.5)
THB: 13.2 G/DL (ref 11.5–16.5)
THB: 13.4 G/DL (ref 11.5–16.5)
THB: 13.4 G/DL (ref 11.5–16.5)
THB: 13.8 G/DL (ref 11.5–16.5)
THB: 14.1 G/DL (ref 11.5–16.5)
THB: 14.1 G/DL (ref 11.5–16.5)
TIDAL VOLUME: 380 ML
TIDAL VOLUME: 380 ML
TIME ANALYZED: 1018
TIME ANALYZED: 1142
TIME ANALYZED: 1154
TIME ANALYZED: 1423
TIME ANALYZED: 325
TIME ANALYZED: 451
TIME ANALYZED: 541
TIME ANALYZED: 544
TIME ANALYZED: 602
TIME ANALYZED: 610
TIME ANALYZED: 709
TIME ANALYZED: 725
TIME ANALYZED: 801
TOTAL PROTEIN: 5.1 G/DL (ref 6.4–8.3)
TOTAL PROTEIN: 5.2 G/DL (ref 6.4–8.3)
TOTAL PROTEIN: 5.2 G/DL (ref 6.4–8.3)
TOTAL PROTEIN: 5.4 G/DL (ref 6.4–8.3)
TOTAL PROTEIN: 5.4 G/DL (ref 6.4–8.3)
TOTAL PROTEIN: 5.5 G/DL (ref 6.4–8.3)
TOTAL PROTEIN: 5.9 G/DL (ref 6.4–8.3)
TOTAL PROTEIN: 6 G/DL (ref 6.4–8.3)
TOTAL PROTEIN: 6.1 G/DL (ref 6.4–8.3)
TOTAL PROTEIN: 6.2 G/DL (ref 6.4–8.3)
TOTAL PROTEIN: 6.2 G/DL (ref 6.4–8.3)
TOTAL PROTEIN: 6.3 G/DL (ref 6.4–8.3)
TOTAL PROTEIN: 6.4 G/DL (ref 6.4–8.3)
TOTAL PROTEIN: 6.6 G/DL (ref 6.4–8.3)
TOTAL PROTEIN: 6.8 G/DL (ref 6.4–8.3)
TROPONIN, HIGH SENSITIVITY: <6 NG/L (ref 0–9)
VACUOLATED NEUTROPHILS: ABNORMAL
VITAMIN D 25-HYDROXY: 41 NG/ML (ref 30–100)
VT MECHANICAL: 380 ML
VT MECHANICAL: 500 ML
VT MECHANICAL: 500 ML
WBC # BLD: 14.8 E9/L (ref 4.5–11.5)
WBC # BLD: 15.4 E9/L (ref 4.5–11.5)
WBC # BLD: 16.6 E9/L (ref 4.5–11.5)
WBC # BLD: 19.9 E9/L (ref 4.5–11.5)
WBC # BLD: 23.3 E9/L (ref 4.5–11.5)
WBC # BLD: 24.2 E9/L (ref 4.5–11.5)
WBC # BLD: 28.3 E9/L (ref 4.5–11.5)
WBC # BLD: 30.1 E9/L (ref 4.5–11.5)
WBC # BLD: 33.8 E9/L (ref 4.5–11.5)
WBC # BLD: 4 E9/L (ref 4.5–11.5)
WBC # BLD: 4.1 E9/L (ref 4.5–11.5)

## 2021-01-01 PROCEDURE — 94003 VENT MGMT INPAT SUBQ DAY: CPT

## 2021-01-01 PROCEDURE — 85651 RBC SED RATE NONAUTOMATED: CPT

## 2021-01-01 PROCEDURE — C9113 INJ PANTOPRAZOLE SODIUM, VIA: HCPCS | Performed by: INTERNAL MEDICINE

## 2021-01-01 PROCEDURE — 84145 PROCALCITONIN (PCT): CPT

## 2021-01-01 PROCEDURE — 83615 LACTATE (LD) (LDH) ENZYME: CPT

## 2021-01-01 PROCEDURE — 83735 ASSAY OF MAGNESIUM: CPT

## 2021-01-01 PROCEDURE — 85025 COMPLETE CBC W/AUTO DIFF WBC: CPT

## 2021-01-01 PROCEDURE — 82728 ASSAY OF FERRITIN: CPT

## 2021-01-01 PROCEDURE — 6370000000 HC RX 637 (ALT 250 FOR IP): Performed by: NURSE PRACTITIONER

## 2021-01-01 PROCEDURE — 2580000003 HC RX 258: Performed by: NURSE PRACTITIONER

## 2021-01-01 PROCEDURE — 99232 SBSQ HOSP IP/OBS MODERATE 35: CPT | Performed by: INTERNAL MEDICINE

## 2021-01-01 PROCEDURE — 31500 INSERT EMERGENCY AIRWAY: CPT

## 2021-01-01 PROCEDURE — 93005 ELECTROCARDIOGRAM TRACING: CPT | Performed by: EMERGENCY MEDICINE

## 2021-01-01 PROCEDURE — 6370000000 HC RX 637 (ALT 250 FOR IP): Performed by: INTERNAL MEDICINE

## 2021-01-01 PROCEDURE — 2500000003 HC RX 250 WO HCPCS: Performed by: NURSE PRACTITIONER

## 2021-01-01 PROCEDURE — 6360000002 HC RX W HCPCS: Performed by: NURSE PRACTITIONER

## 2021-01-01 PROCEDURE — 6360000002 HC RX W HCPCS: Performed by: INTERNAL MEDICINE

## 2021-01-01 PROCEDURE — 87449 NOS EACH ORGANISM AG IA: CPT

## 2021-01-01 PROCEDURE — 80053 COMPREHEN METABOLIC PANEL: CPT

## 2021-01-01 PROCEDURE — 80048 BASIC METABOLIC PNL TOTAL CA: CPT

## 2021-01-01 PROCEDURE — 85378 FIBRIN DEGRADE SEMIQUANT: CPT

## 2021-01-01 PROCEDURE — 82805 BLOOD GASES W/O2 SATURATION: CPT

## 2021-01-01 PROCEDURE — 6360000002 HC RX W HCPCS: Performed by: CLINICAL NURSE SPECIALIST

## 2021-01-01 PROCEDURE — 2580000003 HC RX 258: Performed by: STUDENT IN AN ORGANIZED HEALTH CARE EDUCATION/TRAINING PROGRAM

## 2021-01-01 PROCEDURE — 99233 SBSQ HOSP IP/OBS HIGH 50: CPT | Performed by: INTERNAL MEDICINE

## 2021-01-01 PROCEDURE — 2500000003 HC RX 250 WO HCPCS: Performed by: STUDENT IN AN ORGANIZED HEALTH CARE EDUCATION/TRAINING PROGRAM

## 2021-01-01 PROCEDURE — 36591 DRAW BLOOD OFF VENOUS DEVICE: CPT

## 2021-01-01 PROCEDURE — 36415 COLL VENOUS BLD VENIPUNCTURE: CPT

## 2021-01-01 PROCEDURE — 6370000000 HC RX 637 (ALT 250 FOR IP): Performed by: SPECIALIST

## 2021-01-01 PROCEDURE — 2000000000 HC ICU R&B

## 2021-01-01 PROCEDURE — 86140 C-REACTIVE PROTEIN: CPT

## 2021-01-01 PROCEDURE — 36600 WITHDRAWAL OF ARTERIAL BLOOD: CPT

## 2021-01-01 PROCEDURE — 84100 ASSAY OF PHOSPHORUS: CPT

## 2021-01-01 PROCEDURE — 99221 1ST HOSP IP/OBS SF/LOW 40: CPT | Performed by: PHYSICIAN ASSISTANT

## 2021-01-01 PROCEDURE — 87081 CULTURE SCREEN ONLY: CPT

## 2021-01-01 PROCEDURE — 96375 TX/PRO/DX INJ NEW DRUG ADDON: CPT

## 2021-01-01 PROCEDURE — 2700000000 HC OXYGEN THERAPY PER DAY

## 2021-01-01 PROCEDURE — 94002 VENT MGMT INPAT INIT DAY: CPT

## 2021-01-01 PROCEDURE — 2060000000 HC ICU INTERMEDIATE R&B

## 2021-01-01 PROCEDURE — 6360000002 HC RX W HCPCS: Performed by: ANESTHESIOLOGY

## 2021-01-01 PROCEDURE — 93970 EXTREMITY STUDY: CPT

## 2021-01-01 PROCEDURE — 94640 AIRWAY INHALATION TREATMENT: CPT

## 2021-01-01 PROCEDURE — 82962 GLUCOSE BLOOD TEST: CPT

## 2021-01-01 PROCEDURE — 87206 SMEAR FLUORESCENT/ACID STAI: CPT

## 2021-01-01 PROCEDURE — 2500000003 HC RX 250 WO HCPCS: Performed by: INTERNAL MEDICINE

## 2021-01-01 PROCEDURE — 82306 VITAMIN D 25 HYDROXY: CPT

## 2021-01-01 PROCEDURE — 94660 CPAP INITIATION&MGMT: CPT

## 2021-01-01 PROCEDURE — 71045 X-RAY EXAM CHEST 1 VIEW: CPT

## 2021-01-01 PROCEDURE — 37799 UNLISTED PX VASCULAR SURGERY: CPT

## 2021-01-01 PROCEDURE — 6360000002 HC RX W HCPCS

## 2021-01-01 PROCEDURE — XW0DXM6 INTRODUCTION OF BARICITINIB INTO MOUTH AND PHARYNX, EXTERNAL APPROACH, NEW TECHNOLOGY GROUP 6: ICD-10-PCS | Performed by: INTERNAL MEDICINE

## 2021-01-01 PROCEDURE — 6370000000 HC RX 637 (ALT 250 FOR IP): Performed by: EMERGENCY MEDICINE

## 2021-01-01 PROCEDURE — 2580000003 HC RX 258: Performed by: SPECIALIST

## 2021-01-01 PROCEDURE — 36592 COLLECT BLOOD FROM PICC: CPT

## 2021-01-01 PROCEDURE — 2580000003 HC RX 258: Performed by: EMERGENCY MEDICINE

## 2021-01-01 PROCEDURE — 2580000003 HC RX 258: Performed by: INTERNAL MEDICINE

## 2021-01-01 PROCEDURE — 99239 HOSP IP/OBS DSCHRG MGMT >30: CPT | Performed by: INTERNAL MEDICINE

## 2021-01-01 PROCEDURE — 31500 INSERT EMERGENCY AIRWAY: CPT | Performed by: ANESTHESIOLOGY

## 2021-01-01 PROCEDURE — 0BH17EZ INSERTION OF ENDOTRACHEAL AIRWAY INTO TRACHEA, VIA NATURAL OR ARTIFICIAL OPENING: ICD-10-PCS | Performed by: INTERNAL MEDICINE

## 2021-01-01 PROCEDURE — 51702 INSERT TEMP BLADDER CATH: CPT

## 2021-01-01 PROCEDURE — 87070 CULTURE OTHR SPECIMN AEROBIC: CPT

## 2021-01-01 PROCEDURE — XW033E5 INTRODUCTION OF REMDESIVIR ANTI-INFECTIVE INTO PERIPHERAL VEIN, PERCUTANEOUS APPROACH, NEW TECHNOLOGY GROUP 5: ICD-10-PCS | Performed by: INTERNAL MEDICINE

## 2021-01-01 PROCEDURE — 99232 SBSQ HOSP IP/OBS MODERATE 35: CPT | Performed by: FAMILY MEDICINE

## 2021-01-01 PROCEDURE — 99284 EMERGENCY DEPT VISIT MOD MDM: CPT

## 2021-01-01 PROCEDURE — 2500000003 HC RX 250 WO HCPCS: Performed by: ANESTHESIOLOGY

## 2021-01-01 PROCEDURE — 83605 ASSAY OF LACTIC ACID: CPT

## 2021-01-01 PROCEDURE — 36556 INSERT NON-TUNNEL CV CATH: CPT

## 2021-01-01 PROCEDURE — 71275 CT ANGIOGRAPHY CHEST: CPT

## 2021-01-01 PROCEDURE — 6360000002 HC RX W HCPCS: Performed by: SPECIALIST

## 2021-01-01 PROCEDURE — 36620 INSERTION CATHETER ARTERY: CPT

## 2021-01-01 PROCEDURE — 87040 BLOOD CULTURE FOR BACTERIA: CPT

## 2021-01-01 PROCEDURE — 6360000002 HC RX W HCPCS: Performed by: STUDENT IN AN ORGANIZED HEALTH CARE EDUCATION/TRAINING PROGRAM

## 2021-01-01 PROCEDURE — 87205 SMEAR GRAM STAIN: CPT

## 2021-01-01 PROCEDURE — 84484 ASSAY OF TROPONIN QUANT: CPT

## 2021-01-01 PROCEDURE — 99222 1ST HOSP IP/OBS MODERATE 55: CPT | Performed by: INTERNAL MEDICINE

## 2021-01-01 PROCEDURE — 5A1955Z RESPIRATORY VENTILATION, GREATER THAN 96 CONSECUTIVE HOURS: ICD-10-PCS | Performed by: INTERNAL MEDICINE

## 2021-01-01 PROCEDURE — 87635 SARS-COV-2 COVID-19 AMP PRB: CPT

## 2021-01-01 PROCEDURE — 82803 BLOOD GASES ANY COMBINATION: CPT

## 2021-01-01 PROCEDURE — C1751 CATH, INF, PER/CENT/MIDLINE: HCPCS

## 2021-01-01 PROCEDURE — 87147 CULTURE TYPE IMMUNOLOGIC: CPT

## 2021-01-01 PROCEDURE — 99223 1ST HOSP IP/OBS HIGH 75: CPT | Performed by: INTERNAL MEDICINE

## 2021-01-01 PROCEDURE — 02HV33Z INSERTION OF INFUSION DEVICE INTO SUPERIOR VENA CAVA, PERCUTANEOUS APPROACH: ICD-10-PCS | Performed by: INTERNAL MEDICINE

## 2021-01-01 PROCEDURE — 83036 HEMOGLOBIN GLYCOSYLATED A1C: CPT

## 2021-01-01 PROCEDURE — 6360000004 HC RX CONTRAST MEDICATION: Performed by: RADIOLOGY

## 2021-01-01 PROCEDURE — 6360000002 HC RX W HCPCS: Performed by: EMERGENCY MEDICINE

## 2021-01-01 PROCEDURE — 96374 THER/PROPH/DIAG INJ IV PUSH: CPT

## 2021-01-01 PROCEDURE — 99233 SBSQ HOSP IP/OBS HIGH 50: CPT | Performed by: PHYSICIAN ASSISTANT

## 2021-01-01 RX ORDER — DEXTROSE MONOHYDRATE 25 G/50ML
25 INJECTION, SOLUTION INTRAVENOUS ONCE
Status: COMPLETED | OUTPATIENT
Start: 2021-01-01 | End: 2021-01-01

## 2021-01-01 RX ORDER — DEXAMETHASONE SODIUM PHOSPHATE 10 MG/ML
6 INJECTION, SOLUTION INTRAMUSCULAR; INTRAVENOUS ONCE
Status: COMPLETED | OUTPATIENT
Start: 2021-01-01 | End: 2021-01-01

## 2021-01-01 RX ORDER — NOREPINEPHRINE BITARTRATE 1 MG/ML
INJECTION, SOLUTION INTRAVENOUS
Status: DISPENSED
Start: 2021-01-01 | End: 2021-01-01

## 2021-01-01 RX ORDER — PROPOFOL 10 MG/ML
5-50 INJECTION, EMULSION INTRAVENOUS
Status: DISCONTINUED | OUTPATIENT
Start: 2021-01-01 | End: 2021-01-01

## 2021-01-01 RX ORDER — LEVOTHYROXINE SODIUM 0.07 MG/1
150 TABLET ORAL DAILY
Status: DISCONTINUED | OUTPATIENT
Start: 2021-01-01 | End: 2021-01-01

## 2021-01-01 RX ORDER — 0.9 % SODIUM CHLORIDE 0.9 %
1000 INTRAVENOUS SOLUTION INTRAVENOUS ONCE
Status: COMPLETED | OUTPATIENT
Start: 2021-01-01 | End: 2021-01-01

## 2021-01-01 RX ORDER — INSULIN GLARGINE 100 [IU]/ML
40 INJECTION, SOLUTION SUBCUTANEOUS NIGHTLY
Status: DISCONTINUED | OUTPATIENT
Start: 2021-01-01 | End: 2021-01-01

## 2021-01-01 RX ORDER — AMLODIPINE BESYLATE 5 MG/1
5 TABLET ORAL DAILY
Status: DISCONTINUED | OUTPATIENT
Start: 2021-01-01 | End: 2021-01-01

## 2021-01-01 RX ORDER — SODIUM CHLORIDE 0.9 % (FLUSH) 0.9 %
5-40 SYRINGE (ML) INJECTION PRN
Status: DISCONTINUED | OUTPATIENT
Start: 2021-01-01 | End: 2021-01-01

## 2021-01-01 RX ORDER — DEXAMETHASONE SODIUM PHOSPHATE 4 MG/ML
6 INJECTION, SOLUTION INTRA-ARTICULAR; INTRALESIONAL; INTRAMUSCULAR; INTRAVENOUS; SOFT TISSUE EVERY 12 HOURS
Status: DISCONTINUED | OUTPATIENT
Start: 2021-01-01 | End: 2021-01-01

## 2021-01-01 RX ORDER — SODIUM CHLORIDE 9 MG/ML
25 INJECTION, SOLUTION INTRAVENOUS PRN
Status: DISCONTINUED | OUTPATIENT
Start: 2021-01-01 | End: 2021-01-01

## 2021-01-01 RX ORDER — PROPOFOL 10 MG/ML
INJECTION, EMULSION INTRAVENOUS
Status: COMPLETED
Start: 2021-01-01 | End: 2021-01-01

## 2021-01-01 RX ORDER — PROPOFOL 10 MG/ML
100 INJECTION, EMULSION INTRAVENOUS ONCE
Status: DISCONTINUED | OUTPATIENT
Start: 2021-01-01 | End: 2021-01-01

## 2021-01-01 RX ORDER — POLYETHYLENE GLYCOL 3350 17 G/17G
17 POWDER, FOR SOLUTION ORAL DAILY PRN
Status: DISCONTINUED | OUTPATIENT
Start: 2021-01-01 | End: 2021-01-01

## 2021-01-01 RX ORDER — INSULIN GLARGINE 100 [IU]/ML
45 INJECTION, SOLUTION SUBCUTANEOUS NIGHTLY
Status: DISCONTINUED | OUTPATIENT
Start: 2021-01-01 | End: 2021-01-01

## 2021-01-01 RX ORDER — KETOROLAC TROMETHAMINE 30 MG/ML
15 INJECTION, SOLUTION INTRAMUSCULAR; INTRAVENOUS ONCE
Status: COMPLETED | OUTPATIENT
Start: 2021-01-01 | End: 2021-01-01

## 2021-01-01 RX ORDER — ALBUTEROL SULFATE 2.5 MG/3ML
2.5 SOLUTION RESPIRATORY (INHALATION)
Status: DISCONTINUED | OUTPATIENT
Start: 2021-01-01 | End: 2021-01-01

## 2021-01-01 RX ORDER — DEXAMETHASONE SODIUM PHOSPHATE 4 MG/ML
6 INJECTION, SOLUTION INTRA-ARTICULAR; INTRALESIONAL; INTRAMUSCULAR; INTRAVENOUS; SOFT TISSUE EVERY 6 HOURS
Status: DISCONTINUED | OUTPATIENT
Start: 2021-01-01 | End: 2021-01-01

## 2021-01-01 RX ORDER — ZINC SULFATE 50(220)MG
50 CAPSULE ORAL DAILY
Status: DISCONTINUED | OUTPATIENT
Start: 2021-01-01 | End: 2021-01-01

## 2021-01-01 RX ORDER — INSULIN GLARGINE 100 [IU]/ML
25 INJECTION, SOLUTION SUBCUTANEOUS NIGHTLY
Status: DISCONTINUED | OUTPATIENT
Start: 2021-01-01 | End: 2021-01-01

## 2021-01-01 RX ORDER — GUAIFENESIN/DEXTROMETHORPHAN 100-10MG/5
5 SYRUP ORAL EVERY 6 HOURS
Status: DISCONTINUED | OUTPATIENT
Start: 2021-01-01 | End: 2021-01-01

## 2021-01-01 RX ORDER — LOSARTAN POTASSIUM 50 MG/1
100 TABLET ORAL DAILY
Status: DISCONTINUED | OUTPATIENT
Start: 2021-01-01 | End: 2021-01-01

## 2021-01-01 RX ORDER — SODIUM CHLORIDE 9 MG/ML
INJECTION, SOLUTION INTRAVENOUS CONTINUOUS
Status: DISCONTINUED | OUTPATIENT
Start: 2021-01-01 | End: 2021-01-01

## 2021-01-01 RX ORDER — ROCURONIUM BROMIDE 10 MG/ML
50 INJECTION, SOLUTION INTRAVENOUS ONCE
Status: COMPLETED | OUTPATIENT
Start: 2021-01-01 | End: 2021-01-01

## 2021-01-01 RX ORDER — SODIUM CHLORIDE 9 MG/ML
10 INJECTION INTRAVENOUS DAILY
Status: DISCONTINUED | OUTPATIENT
Start: 2021-01-01 | End: 2021-01-01

## 2021-01-01 RX ORDER — DEXTROSE MONOHYDRATE 25 G/50ML
12.5 INJECTION, SOLUTION INTRAVENOUS PRN
Status: DISCONTINUED | OUTPATIENT
Start: 2021-01-01 | End: 2021-01-01

## 2021-01-01 RX ORDER — INSULIN GLARGINE 100 [IU]/ML
40 INJECTION, SOLUTION SUBCUTANEOUS 2 TIMES DAILY
Status: DISCONTINUED | OUTPATIENT
Start: 2021-01-01 | End: 2021-01-01

## 2021-01-01 RX ORDER — INSULIN GLARGINE 100 [IU]/ML
20 INJECTION, SOLUTION SUBCUTANEOUS NIGHTLY
Status: DISCONTINUED | OUTPATIENT
Start: 2021-01-01 | End: 2021-01-01

## 2021-01-01 RX ORDER — LORAZEPAM 2 MG/ML
1 INJECTION INTRAMUSCULAR EVERY 6 HOURS PRN
Status: DISCONTINUED | OUTPATIENT
Start: 2021-01-01 | End: 2021-01-01

## 2021-01-01 RX ORDER — GLYCOPYRROLATE 0.2 MG/ML
0.2 INJECTION INTRAMUSCULAR; INTRAVENOUS EVERY 4 HOURS PRN
Status: DISCONTINUED | OUTPATIENT
Start: 2021-01-01 | End: 2021-01-01

## 2021-01-01 RX ORDER — ONDANSETRON 2 MG/ML
4 INJECTION INTRAMUSCULAR; INTRAVENOUS EVERY 6 HOURS PRN
Status: DISCONTINUED | OUTPATIENT
Start: 2021-01-01 | End: 2021-01-01

## 2021-01-01 RX ORDER — PANTOPRAZOLE SODIUM 40 MG/10ML
40 INJECTION, POWDER, LYOPHILIZED, FOR SOLUTION INTRAVENOUS DAILY
Status: DISCONTINUED | OUTPATIENT
Start: 2021-01-01 | End: 2021-01-01

## 2021-01-01 RX ORDER — OMEPRAZOLE 20 MG/1
20 CAPSULE, DELAYED RELEASE ORAL EVERY OTHER DAY
Status: DISCONTINUED | OUTPATIENT
Start: 2021-01-01 | End: 2021-01-01 | Stop reason: CLARIF

## 2021-01-01 RX ORDER — NICOTINE POLACRILEX 4 MG
15 LOZENGE BUCCAL PRN
Status: DISCONTINUED | OUTPATIENT
Start: 2021-01-01 | End: 2021-01-01

## 2021-01-01 RX ORDER — ALBUTEROL SULFATE 90 UG/1
2 AEROSOL, METERED RESPIRATORY (INHALATION)
Status: DISCONTINUED | OUTPATIENT
Start: 2021-01-01 | End: 2021-01-01

## 2021-01-01 RX ORDER — DEXTROSE MONOHYDRATE 50 MG/ML
100 INJECTION, SOLUTION INTRAVENOUS PRN
Status: DISCONTINUED | OUTPATIENT
Start: 2021-01-01 | End: 2021-01-01

## 2021-01-01 RX ORDER — SODIUM CHLORIDE 0.9 % (FLUSH) 0.9 %
5-40 SYRINGE (ML) INJECTION EVERY 12 HOURS SCHEDULED
Status: DISCONTINUED | OUTPATIENT
Start: 2021-01-01 | End: 2021-01-01

## 2021-01-01 RX ORDER — GUAIFENESIN 100 MG/5ML
200 SOLUTION ORAL ONCE
Status: COMPLETED | OUTPATIENT
Start: 2021-01-01 | End: 2021-01-01

## 2021-01-01 RX ORDER — DEXAMETHASONE SODIUM PHOSPHATE 4 MG/ML
6 INJECTION, SOLUTION INTRA-ARTICULAR; INTRALESIONAL; INTRAMUSCULAR; INTRAVENOUS; SOFT TISSUE EVERY 24 HOURS
Status: DISCONTINUED | OUTPATIENT
Start: 2021-01-01 | End: 2021-01-01

## 2021-01-01 RX ORDER — ACETAMINOPHEN 325 MG/1
650 TABLET ORAL EVERY 6 HOURS PRN
Status: DISCONTINUED | OUTPATIENT
Start: 2021-01-01 | End: 2021-01-01

## 2021-01-01 RX ORDER — ONDANSETRON 4 MG/1
4 TABLET, ORALLY DISINTEGRATING ORAL EVERY 8 HOURS PRN
Status: DISCONTINUED | OUTPATIENT
Start: 2021-01-01 | End: 2021-09-19 | Stop reason: HOSPADM

## 2021-01-01 RX ORDER — ASCORBIC ACID 500 MG
500 TABLET ORAL 2 TIMES DAILY
Status: DISCONTINUED | OUTPATIENT
Start: 2021-01-01 | End: 2021-01-01

## 2021-01-01 RX ORDER — 0.9 % SODIUM CHLORIDE 0.9 %
30 INTRAVENOUS SOLUTION INTRAVENOUS PRN
Status: DISCONTINUED | OUTPATIENT
Start: 2021-01-01 | End: 2021-01-01

## 2021-01-01 RX ORDER — PANTOPRAZOLE SODIUM 40 MG/10ML
40 INJECTION, POWDER, LYOPHILIZED, FOR SOLUTION INTRAVENOUS EVERY OTHER DAY
Status: DISCONTINUED | OUTPATIENT
Start: 2021-01-01 | End: 2021-01-01

## 2021-01-01 RX ORDER — ROCURONIUM BROMIDE 10 MG/ML
50 INJECTION, SOLUTION INTRAVENOUS ONCE
Status: DISCONTINUED | OUTPATIENT
Start: 2021-01-01 | End: 2021-01-01

## 2021-01-01 RX ORDER — ACETAMINOPHEN 650 MG/1
650 SUPPOSITORY RECTAL EVERY 6 HOURS PRN
Status: DISCONTINUED | OUTPATIENT
Start: 2021-01-01 | End: 2021-01-01

## 2021-01-01 RX ORDER — LOSARTAN POTASSIUM AND HYDROCHLOROTHIAZIDE 25; 100 MG/1; MG/1
1 TABLET ORAL DAILY
Status: DISCONTINUED | OUTPATIENT
Start: 2021-01-01 | End: 2021-01-01 | Stop reason: CLARIF

## 2021-01-01 RX ORDER — INSULIN GLARGINE 100 [IU]/ML
10 INJECTION, SOLUTION SUBCUTANEOUS NIGHTLY
Status: DISCONTINUED | OUTPATIENT
Start: 2021-01-01 | End: 2021-01-01

## 2021-01-01 RX ORDER — SODIUM CHLORIDE 9 MG/ML
10 INJECTION INTRAVENOUS EVERY OTHER DAY
Status: DISCONTINUED | OUTPATIENT
Start: 2021-01-01 | End: 2021-01-01

## 2021-01-01 RX ORDER — HYDROCHLOROTHIAZIDE 25 MG/1
25 TABLET ORAL DAILY
Status: DISCONTINUED | OUTPATIENT
Start: 2021-01-01 | End: 2021-01-01

## 2021-01-01 RX ORDER — INSULIN GLARGINE 100 [IU]/ML
35 INJECTION, SOLUTION SUBCUTANEOUS 2 TIMES DAILY
Status: DISCONTINUED | OUTPATIENT
Start: 2021-01-01 | End: 2021-01-01

## 2021-01-01 RX ORDER — INSULIN GLARGINE 100 [IU]/ML
50 INJECTION, SOLUTION SUBCUTANEOUS NIGHTLY
Status: DISCONTINUED | OUTPATIENT
Start: 2021-01-01 | End: 2021-01-01

## 2021-01-01 RX ORDER — PANTOPRAZOLE SODIUM 40 MG/1
40 TABLET, DELAYED RELEASE ORAL EVERY OTHER DAY
Status: DISCONTINUED | OUTPATIENT
Start: 2021-01-01 | End: 2021-01-01

## 2021-01-01 RX ORDER — VITAMIN B COMPLEX
2000 TABLET ORAL DAILY
Status: DISCONTINUED | OUTPATIENT
Start: 2021-01-01 | End: 2021-01-01

## 2021-01-01 RX ORDER — PRAVASTATIN SODIUM 20 MG
20 TABLET ORAL DAILY
Status: DISCONTINUED | OUTPATIENT
Start: 2021-01-01 | End: 2021-01-01

## 2021-01-01 RX ORDER — INSULIN GLARGINE 100 [IU]/ML
30 INJECTION, SOLUTION SUBCUTANEOUS NIGHTLY
Status: DISCONTINUED | OUTPATIENT
Start: 2021-01-01 | End: 2021-01-01

## 2021-01-01 RX ORDER — IVERMECTIN 3 MG/1
200 TABLET ORAL DAILY
Status: DISCONTINUED | OUTPATIENT
Start: 2021-01-01 | End: 2021-01-01

## 2021-01-01 RX ORDER — GUAIFENESIN/DEXTROMETHORPHAN 100-10MG/5
5 SYRUP ORAL EVERY 4 HOURS PRN
Status: DISCONTINUED | OUTPATIENT
Start: 2021-01-01 | End: 2021-01-01

## 2021-01-01 RX ORDER — CHLORHEXIDINE GLUCONATE 0.12 MG/ML
15 RINSE ORAL 2 TIMES DAILY
Status: DISCONTINUED | OUTPATIENT
Start: 2021-01-01 | End: 2021-01-01

## 2021-01-01 RX ORDER — MINERAL OIL, PETROLATUM 425; 568 MG/G; MG/G
OINTMENT OPHTHALMIC EVERY 6 HOURS
Status: DISCONTINUED | OUTPATIENT
Start: 2021-01-01 | End: 2021-01-01

## 2021-01-01 RX ADMIN — ACETAMINOPHEN 650 MG: 325 TABLET ORAL at 21:48

## 2021-01-01 RX ADMIN — NYSTATIN 500000 UNITS: 500000 SUSPENSION ORAL at 10:40

## 2021-01-01 RX ADMIN — INSULIN LISPRO 14 UNITS: 100 INJECTION, SOLUTION INTRAVENOUS; SUBCUTANEOUS at 14:30

## 2021-01-01 RX ADMIN — ENOXAPARIN SODIUM 40 MG: 40 INJECTION SUBCUTANEOUS at 20:54

## 2021-01-01 RX ADMIN — ENOXAPARIN SODIUM 40 MG: 40 INJECTION SUBCUTANEOUS at 21:48

## 2021-01-01 RX ADMIN — ZINC SULFATE 220 MG (50 MG) CAPSULE 50 MG: CAPSULE at 10:40

## 2021-01-01 RX ADMIN — REMDESIVIR 100 MG: 100 INJECTION, POWDER, LYOPHILIZED, FOR SOLUTION INTRAVENOUS at 14:31

## 2021-01-01 RX ADMIN — IVERMECTIN 15 MG: 3 TABLET ORAL at 18:27

## 2021-01-01 RX ADMIN — DEXAMETHASONE SODIUM PHOSPHATE 6 MG: 4 INJECTION, SOLUTION INTRA-ARTICULAR; INTRALESIONAL; INTRAMUSCULAR; INTRAVENOUS; SOFT TISSUE at 13:54

## 2021-01-01 RX ADMIN — ALBUTEROL SULFATE 2.5 MG: 2.5 SOLUTION RESPIRATORY (INHALATION) at 13:32

## 2021-01-01 RX ADMIN — DEXAMETHASONE SODIUM PHOSPHATE 6 MG: 10 INJECTION, SOLUTION INTRAMUSCULAR; INTRAVENOUS at 09:53

## 2021-01-01 RX ADMIN — DEXAMETHASONE SODIUM PHOSPHATE 6 MG: 4 INJECTION, SOLUTION INTRA-ARTICULAR; INTRALESIONAL; INTRAMUSCULAR; INTRAVENOUS; SOFT TISSUE at 17:15

## 2021-01-01 RX ADMIN — Medication 500 MG: at 20:40

## 2021-01-01 RX ADMIN — ACETAMINOPHEN 650 MG: 325 TABLET ORAL at 09:58

## 2021-01-01 RX ADMIN — SODIUM CHLORIDE, PRESERVATIVE FREE 10 ML: 5 INJECTION INTRAVENOUS at 08:59

## 2021-01-01 RX ADMIN — ALBUTEROL SULFATE 2 PUFF: 108 AEROSOL, METERED RESPIRATORY (INHALATION) at 21:47

## 2021-01-01 RX ADMIN — Medication 175 MCG/HR: at 06:36

## 2021-01-01 RX ADMIN — INSULIN LISPRO 6 UNITS: 100 INJECTION, SOLUTION INTRAVENOUS; SUBCUTANEOUS at 07:38

## 2021-01-01 RX ADMIN — MINERAL OIL AND PETROLATUM: 150; 830 OINTMENT OPHTHALMIC at 11:34

## 2021-01-01 RX ADMIN — ENOXAPARIN SODIUM 40 MG: 40 INJECTION SUBCUTANEOUS at 21:33

## 2021-01-01 RX ADMIN — NYSTATIN 500000 UNITS: 500000 SUSPENSION ORAL at 08:55

## 2021-01-01 RX ADMIN — ENOXAPARIN SODIUM 40 MG: 40 INJECTION SUBCUTANEOUS at 21:19

## 2021-01-01 RX ADMIN — ALBUTEROL SULFATE 2.5 MG: 2.5 SOLUTION RESPIRATORY (INHALATION) at 14:18

## 2021-01-01 RX ADMIN — NYSTATIN 500000 UNITS: 500000 SUSPENSION ORAL at 17:06

## 2021-01-01 RX ADMIN — LEVOTHYROXINE SODIUM 150 MCG: 75 TABLET ORAL at 06:22

## 2021-01-01 RX ADMIN — INSULIN GLARGINE 35 UNITS: 100 INJECTION, SOLUTION SUBCUTANEOUS at 22:20

## 2021-01-01 RX ADMIN — ALBUTEROL SULFATE 2.5 MG: 2.5 SOLUTION RESPIRATORY (INHALATION) at 08:54

## 2021-01-01 RX ADMIN — GUAIFENESIN AND DEXTROMETHORPHAN 5 ML: 100; 10 SYRUP ORAL at 16:32

## 2021-01-01 RX ADMIN — INSULIN LISPRO 10 UNITS: 100 INJECTION, SOLUTION INTRAVENOUS; SUBCUTANEOUS at 15:00

## 2021-01-01 RX ADMIN — GUAIFENESIN AND DEXTROMETHORPHAN 5 ML: 100; 10 SYRUP ORAL at 06:00

## 2021-01-01 RX ADMIN — ALBUTEROL SULFATE 2.5 MG: 2.5 SOLUTION RESPIRATORY (INHALATION) at 20:10

## 2021-01-01 RX ADMIN — INSULIN LISPRO 12 UNITS: 100 INJECTION, SOLUTION INTRAVENOUS; SUBCUTANEOUS at 21:22

## 2021-01-01 RX ADMIN — INSULIN LISPRO 20 UNITS: 100 INJECTION, SOLUTION INTRAVENOUS; SUBCUTANEOUS at 14:51

## 2021-01-01 RX ADMIN — Medication 175 MCG/HR: at 01:05

## 2021-01-01 RX ADMIN — INSULIN HUMAN 10 UNITS: 100 INJECTION, SOLUTION PARENTERAL at 12:58

## 2021-01-01 RX ADMIN — SODIUM CHLORIDE, PRESERVATIVE FREE 10 ML: 5 INJECTION INTRAVENOUS at 08:15

## 2021-01-01 RX ADMIN — DEXAMETHASONE SODIUM PHOSPHATE 6 MG: 4 INJECTION, SOLUTION INTRA-ARTICULAR; INTRALESIONAL; INTRAMUSCULAR; INTRAVENOUS; SOFT TISSUE at 21:30

## 2021-01-01 RX ADMIN — INSULIN LISPRO 6 UNITS: 100 INJECTION, SOLUTION INTRAVENOUS; SUBCUTANEOUS at 11:32

## 2021-01-01 RX ADMIN — GUAIFENESIN AND DEXTROMETHORPHAN 5 ML: 100; 10 SYRUP ORAL at 05:34

## 2021-01-01 RX ADMIN — CISATRACURIUM BESYLATE 2 MCG/KG/MIN: 200 INJECTION, SOLUTION INTRAVENOUS at 12:31

## 2021-01-01 RX ADMIN — ALBUTEROL SULFATE 2.5 MG: 2.5 SOLUTION RESPIRATORY (INHALATION) at 20:36

## 2021-01-01 RX ADMIN — LEVOFLOXACIN 750 MG: 500 TABLET, FILM COATED ORAL at 08:27

## 2021-01-01 RX ADMIN — DEXAMETHASONE SODIUM PHOSPHATE 6 MG: 4 INJECTION, SOLUTION INTRA-ARTICULAR; INTRALESIONAL; INTRAMUSCULAR; INTRAVENOUS; SOFT TISSUE at 21:19

## 2021-01-01 RX ADMIN — BARICITINIB 2 MG: 1 TABLET, FILM COATED ORAL at 08:59

## 2021-01-01 RX ADMIN — LEVOFLOXACIN 750 MG: 500 TABLET, FILM COATED ORAL at 09:00

## 2021-01-01 RX ADMIN — Medication 175 MCG/HR: at 08:29

## 2021-01-01 RX ADMIN — ALBUTEROL SULFATE 2 PUFF: 108 AEROSOL, METERED RESPIRATORY (INHALATION) at 18:32

## 2021-01-01 RX ADMIN — ALBUTEROL SULFATE 2.5 MG: 2.5 SOLUTION RESPIRATORY (INHALATION) at 09:42

## 2021-01-01 RX ADMIN — GUAIFENESIN AND DEXTROMETHORPHAN 5 ML: 100; 10 SYRUP ORAL at 22:16

## 2021-01-01 RX ADMIN — NYSTATIN 500000 UNITS: 500000 SUSPENSION ORAL at 12:00

## 2021-01-01 RX ADMIN — Medication 10 ML: at 10:11

## 2021-01-01 RX ADMIN — NYSTATIN 500000 UNITS: 500000 SUSPENSION ORAL at 21:48

## 2021-01-01 RX ADMIN — PROPOFOL 40 MCG/KG/MIN: 10 INJECTION, EMULSION INTRAVENOUS at 10:18

## 2021-01-01 RX ADMIN — INSULIN GLARGINE 25 UNITS: 100 INJECTION, SOLUTION SUBCUTANEOUS at 20:30

## 2021-01-01 RX ADMIN — DEXAMETHASONE SODIUM PHOSPHATE 6 MG: 4 INJECTION, SOLUTION INTRA-ARTICULAR; INTRALESIONAL; INTRAMUSCULAR; INTRAVENOUS; SOFT TISSUE at 05:13

## 2021-01-01 RX ADMIN — DEXAMETHASONE SODIUM PHOSPHATE 6 MG: 4 INJECTION, SOLUTION INTRA-ARTICULAR; INTRALESIONAL; INTRAMUSCULAR; INTRAVENOUS; SOFT TISSUE at 10:25

## 2021-01-01 RX ADMIN — LOSARTAN POTASSIUM 100 MG: 50 TABLET, FILM COATED ORAL at 09:54

## 2021-01-01 RX ADMIN — AMLODIPINE BESYLATE 5 MG: 5 TABLET ORAL at 08:14

## 2021-01-01 RX ADMIN — LEVOTHYROXINE SODIUM 150 MCG: 75 TABLET ORAL at 06:00

## 2021-01-01 RX ADMIN — Medication 175 MCG/HR: at 15:48

## 2021-01-01 RX ADMIN — Medication 500 MG: at 10:09

## 2021-01-01 RX ADMIN — ALBUTEROL SULFATE 2.5 MG: 2.5 SOLUTION RESPIRATORY (INHALATION) at 20:33

## 2021-01-01 RX ADMIN — ENOXAPARIN SODIUM 40 MG: 40 INJECTION SUBCUTANEOUS at 10:43

## 2021-01-01 RX ADMIN — DEXAMETHASONE SODIUM PHOSPHATE 6 MG: 4 INJECTION, SOLUTION INTRA-ARTICULAR; INTRALESIONAL; INTRAMUSCULAR; INTRAVENOUS; SOFT TISSUE at 10:09

## 2021-01-01 RX ADMIN — PRAVASTATIN SODIUM 20 MG: 20 TABLET ORAL at 09:59

## 2021-01-01 RX ADMIN — DEXAMETHASONE SODIUM PHOSPHATE 6 MG: 4 INJECTION, SOLUTION INTRA-ARTICULAR; INTRALESIONAL; INTRAMUSCULAR; INTRAVENOUS; SOFT TISSUE at 15:53

## 2021-01-01 RX ADMIN — ACETAMINOPHEN 650 MG: 325 TABLET ORAL at 22:54

## 2021-01-01 RX ADMIN — GUAIFENESIN AND DEXTROMETHORPHAN 5 ML: 100; 10 SYRUP ORAL at 12:07

## 2021-01-01 RX ADMIN — MINERAL OIL AND PETROLATUM: 150; 830 OINTMENT OPHTHALMIC at 17:06

## 2021-01-01 RX ADMIN — PANTOPRAZOLE SODIUM 40 MG: 40 INJECTION, POWDER, FOR SOLUTION INTRAVENOUS at 08:36

## 2021-01-01 RX ADMIN — ALBUTEROL SULFATE 2 PUFF: 108 AEROSOL, METERED RESPIRATORY (INHALATION) at 16:46

## 2021-01-01 RX ADMIN — PROPOFOL 45 MCG/KG/MIN: 10 INJECTION, EMULSION INTRAVENOUS at 18:19

## 2021-01-01 RX ADMIN — CHLORHEXIDINE GLUCONATE 0.12% ORAL RINSE 15 ML: 1.2 LIQUID ORAL at 08:58

## 2021-01-01 RX ADMIN — ENOXAPARIN SODIUM 40 MG: 40 INJECTION SUBCUTANEOUS at 10:11

## 2021-01-01 RX ADMIN — PANTOPRAZOLE SODIUM 40 MG: 40 INJECTION, POWDER, FOR SOLUTION INTRAVENOUS at 08:14

## 2021-01-01 RX ADMIN — NYSTATIN 500000 UNITS: 500000 SUSPENSION ORAL at 08:05

## 2021-01-01 RX ADMIN — DEXTROSE MONOHYDRATE 25 G: 25 INJECTION, SOLUTION INTRAVENOUS at 09:10

## 2021-01-01 RX ADMIN — ZINC SULFATE 220 MG (50 MG) CAPSULE 50 MG: CAPSULE at 08:55

## 2021-01-01 RX ADMIN — INSULIN LISPRO 10 UNITS: 100 INJECTION, SOLUTION INTRAVENOUS; SUBCUTANEOUS at 09:36

## 2021-01-01 RX ADMIN — Medication 175 MCG/HR: at 10:38

## 2021-01-01 RX ADMIN — PROPOFOL 50 MCG/KG/MIN: 10 INJECTION, EMULSION INTRAVENOUS at 21:49

## 2021-01-01 RX ADMIN — Medication 10 ML: at 09:30

## 2021-01-01 RX ADMIN — Medication 500 MG: at 09:54

## 2021-01-01 RX ADMIN — INSULIN LISPRO 10 UNITS: 100 INJECTION, SOLUTION INTRAVENOUS; SUBCUTANEOUS at 12:00

## 2021-01-01 RX ADMIN — GUAIFENESIN AND DEXTROMETHORPHAN 5 ML: 100; 10 SYRUP ORAL at 05:45

## 2021-01-01 RX ADMIN — Medication 500 MG: at 20:25

## 2021-01-01 RX ADMIN — PRAVASTATIN SODIUM 20 MG: 20 TABLET ORAL at 18:11

## 2021-01-01 RX ADMIN — AMLODIPINE BESYLATE 5 MG: 5 TABLET ORAL at 08:40

## 2021-01-01 RX ADMIN — ROCURONIUM BROMIDE 50 MG: 50 INJECTION, SOLUTION INTRAVENOUS at 03:45

## 2021-01-01 RX ADMIN — DEXAMETHASONE SODIUM PHOSPHATE 6 MG: 4 INJECTION, SOLUTION INTRA-ARTICULAR; INTRALESIONAL; INTRAMUSCULAR; INTRAVENOUS; SOFT TISSUE at 10:04

## 2021-01-01 RX ADMIN — GUAIFENESIN AND DEXTROMETHORPHAN 5 ML: 100; 10 SYRUP ORAL at 22:54

## 2021-01-01 RX ADMIN — LOSARTAN POTASSIUM 100 MG: 50 TABLET, FILM COATED ORAL at 10:43

## 2021-01-01 RX ADMIN — INSULIN LISPRO 12 UNITS: 100 INJECTION, SOLUTION INTRAVENOUS; SUBCUTANEOUS at 06:02

## 2021-01-01 RX ADMIN — GUAIFENESIN AND DEXTROMETHORPHAN 5 ML: 100; 10 SYRUP ORAL at 16:21

## 2021-01-01 RX ADMIN — Medication 2000 UNITS: at 08:28

## 2021-01-01 RX ADMIN — Medication 2000 UNITS: at 08:58

## 2021-01-01 RX ADMIN — PRAVASTATIN SODIUM 20 MG: 20 TABLET ORAL at 09:00

## 2021-01-01 RX ADMIN — LOSARTAN POTASSIUM 100 MG: 50 TABLET, FILM COATED ORAL at 10:13

## 2021-01-01 RX ADMIN — DEXAMETHASONE SODIUM PHOSPHATE 6 MG: 4 INJECTION, SOLUTION INTRA-ARTICULAR; INTRALESIONAL; INTRAMUSCULAR; INTRAVENOUS; SOFT TISSUE at 17:02

## 2021-01-01 RX ADMIN — HYDROCHLOROTHIAZIDE 25 MG: 25 TABLET ORAL at 10:38

## 2021-01-01 RX ADMIN — Medication 10 ML: at 10:14

## 2021-01-01 RX ADMIN — INSULIN GLARGINE 40 UNITS: 100 INJECTION, SOLUTION SUBCUTANEOUS at 20:48

## 2021-01-01 RX ADMIN — INSULIN LISPRO 12 UNITS: 100 INJECTION, SOLUTION INTRAVENOUS; SUBCUTANEOUS at 15:14

## 2021-01-01 RX ADMIN — LEVOTHYROXINE SODIUM 150 MCG: 75 TABLET ORAL at 05:39

## 2021-01-01 RX ADMIN — INSULIN GLARGINE 25 UNITS: 100 INJECTION, SOLUTION SUBCUTANEOUS at 21:38

## 2021-01-01 RX ADMIN — LEVOTHYROXINE SODIUM 150 MCG: 75 TABLET ORAL at 05:46

## 2021-01-01 RX ADMIN — INSULIN GLARGINE 40 UNITS: 100 INJECTION, SOLUTION SUBCUTANEOUS at 09:22

## 2021-01-01 RX ADMIN — GUAIFENESIN AND DEXTROMETHORPHAN 5 ML: 100; 10 SYRUP ORAL at 21:48

## 2021-01-01 RX ADMIN — Medication 500 MG: at 10:43

## 2021-01-01 RX ADMIN — Medication 175 MCG/HR: at 01:21

## 2021-01-01 RX ADMIN — ALBUTEROL SULFATE 2.5 MG: 2.5 SOLUTION RESPIRATORY (INHALATION) at 16:20

## 2021-01-01 RX ADMIN — MINERAL OIL AND PETROLATUM: 150; 830 OINTMENT OPHTHALMIC at 07:58

## 2021-01-01 RX ADMIN — SODIUM CHLORIDE, PRESERVATIVE FREE 10 ML: 5 INJECTION INTRAVENOUS at 08:53

## 2021-01-01 RX ADMIN — INSULIN LISPRO 9 UNITS: 100 INJECTION, SOLUTION INTRAVENOUS; SUBCUTANEOUS at 12:52

## 2021-01-01 RX ADMIN — LOSARTAN POTASSIUM 100 MG: 50 TABLET, FILM COATED ORAL at 09:58

## 2021-01-01 RX ADMIN — NYSTATIN 500000 UNITS: 500000 SUSPENSION ORAL at 17:01

## 2021-01-01 RX ADMIN — DEXAMETHASONE SODIUM PHOSPHATE 6 MG: 4 INJECTION, SOLUTION INTRA-ARTICULAR; INTRALESIONAL; INTRAMUSCULAR; INTRAVENOUS; SOFT TISSUE at 22:16

## 2021-01-01 RX ADMIN — Medication 500 MG: at 08:36

## 2021-01-01 RX ADMIN — Medication 2000 UNITS: at 08:36

## 2021-01-01 RX ADMIN — INSULIN LISPRO 8 UNITS: 100 INJECTION, SOLUTION INTRAVENOUS; SUBCUTANEOUS at 16:24

## 2021-01-01 RX ADMIN — GUAIFENESIN AND DEXTROMETHORPHAN 5 ML: 100; 10 SYRUP ORAL at 21:34

## 2021-01-01 RX ADMIN — VASOPRESSIN 0.08 UNITS/MIN: 20 INJECTION INTRAVENOUS at 10:40

## 2021-01-01 RX ADMIN — INSULIN LISPRO 5 UNITS: 100 INJECTION, SOLUTION INTRAVENOUS; SUBCUTANEOUS at 21:24

## 2021-01-01 RX ADMIN — Medication 175 MCG/HR: at 16:30

## 2021-01-01 RX ADMIN — INSULIN LISPRO 5 UNITS: 100 INJECTION, SOLUTION INTRAVENOUS; SUBCUTANEOUS at 22:27

## 2021-01-01 RX ADMIN — NYSTATIN 500000 UNITS: 500000 SUSPENSION ORAL at 14:10

## 2021-01-01 RX ADMIN — ENOXAPARIN SODIUM 50 MG: 60 INJECTION SUBCUTANEOUS at 08:05

## 2021-01-01 RX ADMIN — INSULIN LISPRO 15 UNITS: 100 INJECTION, SOLUTION INTRAVENOUS; SUBCUTANEOUS at 11:28

## 2021-01-01 RX ADMIN — DEXAMETHASONE SODIUM PHOSPHATE 6 MG: 4 INJECTION, SOLUTION INTRA-ARTICULAR; INTRALESIONAL; INTRAMUSCULAR; INTRAVENOUS; SOFT TISSUE at 10:44

## 2021-01-01 RX ADMIN — INSULIN LISPRO 4 UNITS: 100 INJECTION, SOLUTION INTRAVENOUS; SUBCUTANEOUS at 11:33

## 2021-01-01 RX ADMIN — NYSTATIN 500000 UNITS: 500000 SUSPENSION ORAL at 09:31

## 2021-01-01 RX ADMIN — Medication 10 ML: at 19:45

## 2021-01-01 RX ADMIN — Medication 175 MCG/HR: at 03:34

## 2021-01-01 RX ADMIN — Medication 175 MCG/HR: at 08:17

## 2021-01-01 RX ADMIN — EPINEPHRINE 2 MCG/MIN: 1 INJECTION, SOLUTION, CONCENTRATE INTRAVENOUS at 14:17

## 2021-01-01 RX ADMIN — ZINC SULFATE 220 MG (50 MG) CAPSULE 50 MG: CAPSULE at 09:58

## 2021-01-01 RX ADMIN — NYSTATIN 500000 UNITS: 500000 SUSPENSION ORAL at 21:32

## 2021-01-01 RX ADMIN — INSULIN LISPRO 10 UNITS: 100 INJECTION, SOLUTION INTRAVENOUS; SUBCUTANEOUS at 10:08

## 2021-01-01 RX ADMIN — HYDROCHLOROTHIAZIDE 25 MG: 25 TABLET ORAL at 08:39

## 2021-01-01 RX ADMIN — NYSTATIN 500000 UNITS: 500000 SUSPENSION ORAL at 16:33

## 2021-01-01 RX ADMIN — INSULIN LISPRO 8 UNITS: 100 INJECTION, SOLUTION INTRAVENOUS; SUBCUTANEOUS at 06:21

## 2021-01-01 RX ADMIN — NOREPINEPHRINE BITARTRATE 100 MCG/MIN: 1 INJECTION, SOLUTION, CONCENTRATE INTRAVENOUS at 15:45

## 2021-01-01 RX ADMIN — GUAIFENESIN AND DEXTROMETHORPHAN 5 ML: 100; 10 SYRUP ORAL at 10:08

## 2021-01-01 RX ADMIN — ALBUTEROL SULFATE 2 PUFF: 108 AEROSOL, METERED RESPIRATORY (INHALATION) at 11:38

## 2021-01-01 RX ADMIN — DEXAMETHASONE SODIUM PHOSPHATE 6 MG: 4 INJECTION, SOLUTION INTRA-ARTICULAR; INTRALESIONAL; INTRAMUSCULAR; INTRAVENOUS; SOFT TISSUE at 21:34

## 2021-01-01 RX ADMIN — Medication 10 ML: at 09:01

## 2021-01-01 RX ADMIN — LOSARTAN POTASSIUM 100 MG: 50 TABLET, FILM COATED ORAL at 10:39

## 2021-01-01 RX ADMIN — LOSARTAN POTASSIUM 100 MG: 50 TABLET, FILM COATED ORAL at 11:29

## 2021-01-01 RX ADMIN — INSULIN LISPRO 12 UNITS: 100 INJECTION, SOLUTION INTRAVENOUS; SUBCUTANEOUS at 11:57

## 2021-01-01 RX ADMIN — LOSARTAN POTASSIUM 100 MG: 50 TABLET, FILM COATED ORAL at 10:04

## 2021-01-01 RX ADMIN — Medication 10 ML: at 09:59

## 2021-01-01 RX ADMIN — REMDESIVIR 200 MG: 100 INJECTION, POWDER, LYOPHILIZED, FOR SOLUTION INTRAVENOUS at 18:11

## 2021-01-01 RX ADMIN — PRAVASTATIN SODIUM 20 MG: 20 TABLET ORAL at 08:04

## 2021-01-01 RX ADMIN — ENOXAPARIN SODIUM 50 MG: 60 INJECTION SUBCUTANEOUS at 08:12

## 2021-01-01 RX ADMIN — Medication 10 ML: at 21:25

## 2021-01-01 RX ADMIN — Medication 500 MG: at 20:20

## 2021-01-01 RX ADMIN — ENOXAPARIN SODIUM 40 MG: 40 INJECTION SUBCUTANEOUS at 08:28

## 2021-01-01 RX ADMIN — ACETAMINOPHEN 650 MG: 325 TABLET ORAL at 16:49

## 2021-01-01 RX ADMIN — MINERAL OIL AND PETROLATUM: 150; 830 OINTMENT OPHTHALMIC at 23:34

## 2021-01-01 RX ADMIN — Medication 175 MCG/HR: at 22:44

## 2021-01-01 RX ADMIN — INSULIN LISPRO 7 UNITS: 100 INJECTION, SOLUTION INTRAVENOUS; SUBCUTANEOUS at 08:56

## 2021-01-01 RX ADMIN — NYSTATIN 500000 UNITS: 500000 SUSPENSION ORAL at 16:53

## 2021-01-01 RX ADMIN — GUAIFENESIN AND DEXTROMETHORPHAN 5 ML: 100; 10 SYRUP ORAL at 18:21

## 2021-01-01 RX ADMIN — DEXAMETHASONE SODIUM PHOSPHATE 6 MG: 4 INJECTION, SOLUTION INTRA-ARTICULAR; INTRALESIONAL; INTRAMUSCULAR; INTRAVENOUS; SOFT TISSUE at 17:18

## 2021-01-01 RX ADMIN — PANTOPRAZOLE SODIUM 40 MG: 40 TABLET, DELAYED RELEASE ORAL at 10:31

## 2021-01-01 RX ADMIN — SODIUM CHLORIDE 1000 ML: 9 INJECTION, SOLUTION INTRAVENOUS at 08:06

## 2021-01-01 RX ADMIN — INSULIN LISPRO 8 UNITS: 100 INJECTION, SOLUTION INTRAVENOUS; SUBCUTANEOUS at 16:47

## 2021-01-01 RX ADMIN — LOSARTAN POTASSIUM 100 MG: 50 TABLET, FILM COATED ORAL at 08:39

## 2021-01-01 RX ADMIN — ALBUTEROL SULFATE 2 PUFF: 108 AEROSOL, METERED RESPIRATORY (INHALATION) at 15:38

## 2021-01-01 RX ADMIN — Medication 10 ML: at 08:56

## 2021-01-01 RX ADMIN — DEXAMETHASONE SODIUM PHOSPHATE 6 MG: 4 INJECTION, SOLUTION INTRA-ARTICULAR; INTRALESIONAL; INTRAMUSCULAR; INTRAVENOUS; SOFT TISSUE at 15:33

## 2021-01-01 RX ADMIN — INSULIN LISPRO 14 UNITS: 100 INJECTION, SOLUTION INTRAVENOUS; SUBCUTANEOUS at 10:24

## 2021-01-01 RX ADMIN — ZINC SULFATE 220 MG (50 MG) CAPSULE 50 MG: CAPSULE at 08:13

## 2021-01-01 RX ADMIN — MINERAL OIL AND PETROLATUM: 150; 830 OINTMENT OPHTHALMIC at 12:00

## 2021-01-01 RX ADMIN — Medication 2000 UNITS: at 09:51

## 2021-01-01 RX ADMIN — DEXAMETHASONE SODIUM PHOSPHATE 6 MG: 4 INJECTION, SOLUTION INTRA-ARTICULAR; INTRALESIONAL; INTRAMUSCULAR; INTRAVENOUS; SOFT TISSUE at 06:09

## 2021-01-01 RX ADMIN — GUAIFENESIN AND DEXTROMETHORPHAN 5 ML: 100; 10 SYRUP ORAL at 16:17

## 2021-01-01 RX ADMIN — INSULIN LISPRO 4 UNITS: 100 INJECTION, SOLUTION INTRAVENOUS; SUBCUTANEOUS at 21:59

## 2021-01-01 RX ADMIN — PROPOFOL 50 MCG/KG/MIN: 10 INJECTION, EMULSION INTRAVENOUS at 01:15

## 2021-01-01 RX ADMIN — ENOXAPARIN SODIUM 40 MG: 40 INJECTION SUBCUTANEOUS at 21:17

## 2021-01-01 RX ADMIN — PROPOFOL 40 MCG/KG/MIN: 10 INJECTION, EMULSION INTRAVENOUS at 04:28

## 2021-01-01 RX ADMIN — CHLORHEXIDINE GLUCONATE 0.12% ORAL RINSE 15 ML: 1.2 LIQUID ORAL at 21:39

## 2021-01-01 RX ADMIN — ENOXAPARIN SODIUM 40 MG: 40 INJECTION SUBCUTANEOUS at 10:13

## 2021-01-01 RX ADMIN — PRAVASTATIN SODIUM 20 MG: 20 TABLET ORAL at 09:31

## 2021-01-01 RX ADMIN — Medication 10 ML: at 08:43

## 2021-01-01 RX ADMIN — HYDROCHLOROTHIAZIDE 25 MG: 25 TABLET ORAL at 09:55

## 2021-01-01 RX ADMIN — DEXAMETHASONE SODIUM PHOSPHATE 6 MG: 4 INJECTION, SOLUTION INTRA-ARTICULAR; INTRALESIONAL; INTRAMUSCULAR; INTRAVENOUS; SOFT TISSUE at 06:38

## 2021-01-01 RX ADMIN — ENOXAPARIN SODIUM 40 MG: 40 INJECTION SUBCUTANEOUS at 20:24

## 2021-01-01 RX ADMIN — DEXAMETHASONE SODIUM PHOSPHATE 6 MG: 4 INJECTION, SOLUTION INTRA-ARTICULAR; INTRALESIONAL; INTRAMUSCULAR; INTRAVENOUS; SOFT TISSUE at 06:50

## 2021-01-01 RX ADMIN — DEXAMETHASONE SODIUM PHOSPHATE 6 MG: 4 INJECTION, SOLUTION INTRA-ARTICULAR; INTRALESIONAL; INTRAMUSCULAR; INTRAVENOUS; SOFT TISSUE at 10:12

## 2021-01-01 RX ADMIN — Medication 10 ML: at 21:39

## 2021-01-01 RX ADMIN — GUAIFENESIN AND DEXTROMETHORPHAN 5 ML: 100; 10 SYRUP ORAL at 16:53

## 2021-01-01 RX ADMIN — DEXAMETHASONE SODIUM PHOSPHATE 6 MG: 4 INJECTION, SOLUTION INTRA-ARTICULAR; INTRALESIONAL; INTRAMUSCULAR; INTRAVENOUS; SOFT TISSUE at 02:39

## 2021-01-01 RX ADMIN — ALBUTEROL SULFATE 2 PUFF: 108 AEROSOL, METERED RESPIRATORY (INHALATION) at 17:00

## 2021-01-01 RX ADMIN — Medication 500 MG: at 08:14

## 2021-01-01 RX ADMIN — Medication 500 MG: at 20:53

## 2021-01-01 RX ADMIN — HYDROCHLOROTHIAZIDE 25 MG: 25 TABLET ORAL at 08:14

## 2021-01-01 RX ADMIN — LEVOTHYROXINE SODIUM 150 MCG: 75 TABLET ORAL at 06:38

## 2021-01-01 RX ADMIN — ENOXAPARIN SODIUM 40 MG: 40 INJECTION SUBCUTANEOUS at 20:52

## 2021-01-01 RX ADMIN — ALBUTEROL SULFATE 2 PUFF: 108 AEROSOL, METERED RESPIRATORY (INHALATION) at 11:28

## 2021-01-01 RX ADMIN — Medication 175 MCG/HR: at 01:43

## 2021-01-01 RX ADMIN — Medication 2000 UNITS: at 08:05

## 2021-01-01 RX ADMIN — ZINC SULFATE 220 MG (50 MG) CAPSULE 50 MG: CAPSULE at 09:52

## 2021-01-01 RX ADMIN — INSULIN LISPRO 20 UNITS: 100 INJECTION, SOLUTION INTRAVENOUS; SUBCUTANEOUS at 07:05

## 2021-01-01 RX ADMIN — SODIUM CHLORIDE: 9 INJECTION, SOLUTION INTRAVENOUS at 23:02

## 2021-01-01 RX ADMIN — Medication 10 ML: at 21:03

## 2021-01-01 RX ADMIN — Medication 50 MEQ: at 09:07

## 2021-01-01 RX ADMIN — ACETAMINOPHEN 650 MG: 325 TABLET ORAL at 06:55

## 2021-01-01 RX ADMIN — NYSTATIN 500000 UNITS: 500000 SUSPENSION ORAL at 13:54

## 2021-01-01 RX ADMIN — ALBUTEROL SULFATE 2.5 MG: 2.5 SOLUTION RESPIRATORY (INHALATION) at 09:26

## 2021-01-01 RX ADMIN — Medication 10 ML: at 21:31

## 2021-01-01 RX ADMIN — NYSTATIN 500000 UNITS: 500000 SUSPENSION ORAL at 08:39

## 2021-01-01 RX ADMIN — INSULIN LISPRO 6 UNITS: 100 INJECTION, SOLUTION INTRAVENOUS; SUBCUTANEOUS at 12:25

## 2021-01-01 RX ADMIN — GUAIFENESIN AND DEXTROMETHORPHAN 5 ML: 100; 10 SYRUP ORAL at 20:53

## 2021-01-01 RX ADMIN — PHENYLEPHRINE HYDROCHLORIDE 300 MCG/MIN: 10 INJECTION INTRAVENOUS at 13:10

## 2021-01-01 RX ADMIN — INSULIN LISPRO 10 UNITS: 100 INJECTION, SOLUTION INTRAVENOUS; SUBCUTANEOUS at 05:54

## 2021-01-01 RX ADMIN — INSULIN LISPRO 8 UNITS: 100 INJECTION, SOLUTION INTRAVENOUS; SUBCUTANEOUS at 01:43

## 2021-01-01 RX ADMIN — GUAIFENESIN AND DEXTROMETHORPHAN 5 ML: 100; 10 SYRUP ORAL at 04:30

## 2021-01-01 RX ADMIN — PANTOPRAZOLE SODIUM 40 MG: 40 TABLET, DELAYED RELEASE ORAL at 09:58

## 2021-01-01 RX ADMIN — Medication 175 MCG/HR: at 21:00

## 2021-01-01 RX ADMIN — DEXAMETHASONE SODIUM PHOSPHATE 6 MG: 4 INJECTION, SOLUTION INTRA-ARTICULAR; INTRALESIONAL; INTRAMUSCULAR; INTRAVENOUS; SOFT TISSUE at 10:15

## 2021-01-01 RX ADMIN — PRAVASTATIN SODIUM 20 MG: 20 TABLET ORAL at 10:16

## 2021-01-01 RX ADMIN — INSULIN LISPRO 7 UNITS: 100 INJECTION, SOLUTION INTRAVENOUS; SUBCUTANEOUS at 12:00

## 2021-01-01 RX ADMIN — PRAVASTATIN SODIUM 20 MG: 20 TABLET ORAL at 10:43

## 2021-01-01 RX ADMIN — CHLORHEXIDINE GLUCONATE 0.12% ORAL RINSE 15 ML: 1.2 LIQUID ORAL at 21:41

## 2021-01-01 RX ADMIN — GUAIFENESIN AND DEXTROMETHORPHAN 5 ML: 100; 10 SYRUP ORAL at 16:08

## 2021-01-01 RX ADMIN — DEXAMETHASONE SODIUM PHOSPHATE 6 MG: 4 INJECTION, SOLUTION INTRA-ARTICULAR; INTRALESIONAL; INTRAMUSCULAR; INTRAVENOUS; SOFT TISSUE at 04:46

## 2021-01-01 RX ADMIN — INSULIN LISPRO 4 UNITS: 100 INJECTION, SOLUTION INTRAVENOUS; SUBCUTANEOUS at 22:07

## 2021-01-01 RX ADMIN — LEVOTHYROXINE SODIUM 150 MCG: 75 TABLET ORAL at 05:14

## 2021-01-01 RX ADMIN — Medication 2000 UNITS: at 10:31

## 2021-01-01 RX ADMIN — ALBUTEROL SULFATE 2.5 MG: 2.5 SOLUTION RESPIRATORY (INHALATION) at 07:02

## 2021-01-01 RX ADMIN — INSULIN LISPRO 9 UNITS: 100 INJECTION, SOLUTION INTRAVENOUS; SUBCUTANEOUS at 18:25

## 2021-01-01 RX ADMIN — SODIUM CHLORIDE, PRESERVATIVE FREE 10 ML: 5 INJECTION INTRAVENOUS at 05:14

## 2021-01-01 RX ADMIN — Medication 500 MG: at 21:39

## 2021-01-01 RX ADMIN — ALBUTEROL SULFATE 2.5 MG: 2.5 SOLUTION RESPIRATORY (INHALATION) at 15:43

## 2021-01-01 RX ADMIN — INSULIN GLARGINE 40 UNITS: 100 INJECTION, SOLUTION SUBCUTANEOUS at 10:36

## 2021-01-01 RX ADMIN — GUAIFENESIN AND DEXTROMETHORPHAN 5 ML: 100; 10 SYRUP ORAL at 16:30

## 2021-01-01 RX ADMIN — BARICITINIB 2 MG: 1 TABLET, FILM COATED ORAL at 08:13

## 2021-01-01 RX ADMIN — ENOXAPARIN SODIUM 40 MG: 40 INJECTION SUBCUTANEOUS at 10:40

## 2021-01-01 RX ADMIN — Medication 500 MG: at 20:49

## 2021-01-01 RX ADMIN — PROPOFOL 10 MCG/KG/MIN: 10 INJECTION, EMULSION INTRAVENOUS at 22:51

## 2021-01-01 RX ADMIN — Medication 175 MCG/HR: at 13:18

## 2021-01-01 RX ADMIN — CHLORHEXIDINE GLUCONATE 0.12% ORAL RINSE 15 ML: 1.2 LIQUID ORAL at 08:44

## 2021-01-01 RX ADMIN — Medication 10 ML: at 21:21

## 2021-01-01 RX ADMIN — GUAIFENESIN AND DEXTROMETHORPHAN 5 ML: 100; 10 SYRUP ORAL at 05:28

## 2021-01-01 RX ADMIN — GUAIFENESIN AND DEXTROMETHORPHAN 5 ML: 100; 10 SYRUP ORAL at 10:43

## 2021-01-01 RX ADMIN — ENOXAPARIN SODIUM 40 MG: 40 INJECTION SUBCUTANEOUS at 09:00

## 2021-01-01 RX ADMIN — INSULIN GLARGINE 50 UNITS: 100 INJECTION, SOLUTION SUBCUTANEOUS at 21:35

## 2021-01-01 RX ADMIN — ALBUTEROL SULFATE 2 PUFF: 108 AEROSOL, METERED RESPIRATORY (INHALATION) at 09:45

## 2021-01-01 RX ADMIN — Medication 2000 UNITS: at 10:43

## 2021-01-01 RX ADMIN — ALBUTEROL SULFATE 2 PUFF: 108 AEROSOL, METERED RESPIRATORY (INHALATION) at 09:31

## 2021-01-01 RX ADMIN — INSULIN LISPRO 10 UNITS: 100 INJECTION, SOLUTION INTRAVENOUS; SUBCUTANEOUS at 06:54

## 2021-01-01 RX ADMIN — LEVOTHYROXINE SODIUM 150 MCG: 75 TABLET ORAL at 05:28

## 2021-01-01 RX ADMIN — ALBUTEROL SULFATE 2.5 MG: 2.5 SOLUTION RESPIRATORY (INHALATION) at 13:29

## 2021-01-01 RX ADMIN — Medication 10 ML: at 09:52

## 2021-01-01 RX ADMIN — INSULIN GLARGINE 40 UNITS: 100 INJECTION, SOLUTION SUBCUTANEOUS at 20:20

## 2021-01-01 RX ADMIN — Medication 50 MEQ: at 09:15

## 2021-01-01 RX ADMIN — DEXAMETHASONE SODIUM PHOSPHATE 6 MG: 4 INJECTION, SOLUTION INTRA-ARTICULAR; INTRALESIONAL; INTRAMUSCULAR; INTRAVENOUS; SOFT TISSUE at 08:26

## 2021-01-01 RX ADMIN — NYSTATIN 500000 UNITS: 500000 SUSPENSION ORAL at 17:19

## 2021-01-01 RX ADMIN — NYSTATIN 500000 UNITS: 500000 SUSPENSION ORAL at 20:49

## 2021-01-01 RX ADMIN — ALBUTEROL SULFATE 2.5 MG: 2.5 SOLUTION RESPIRATORY (INHALATION) at 08:44

## 2021-01-01 RX ADMIN — DEXAMETHASONE SODIUM PHOSPHATE 6 MG: 4 INJECTION, SOLUTION INTRA-ARTICULAR; INTRALESIONAL; INTRAMUSCULAR; INTRAVENOUS; SOFT TISSUE at 08:40

## 2021-01-01 RX ADMIN — ACETAMINOPHEN 650 MG: 325 TABLET ORAL at 20:27

## 2021-01-01 RX ADMIN — Medication 10 ML: at 08:29

## 2021-01-01 RX ADMIN — LOSARTAN POTASSIUM 100 MG: 50 TABLET, FILM COATED ORAL at 08:14

## 2021-01-01 RX ADMIN — GUAIFENESIN AND DEXTROMETHORPHAN 5 ML: 100; 10 SYRUP ORAL at 04:45

## 2021-01-01 RX ADMIN — ENOXAPARIN SODIUM 50 MG: 60 INJECTION SUBCUTANEOUS at 21:39

## 2021-01-01 RX ADMIN — CISATRACURIUM BESYLATE 2 MCG/KG/MIN: 200 INJECTION, SOLUTION INTRAVENOUS at 06:38

## 2021-01-01 RX ADMIN — ACETAMINOPHEN 650 MG: 325 TABLET ORAL at 16:22

## 2021-01-01 RX ADMIN — LEVOFLOXACIN 750 MG: 500 TABLET, FILM COATED ORAL at 10:38

## 2021-01-01 RX ADMIN — INSULIN LISPRO 16 UNITS: 100 INJECTION, SOLUTION INTRAVENOUS; SUBCUTANEOUS at 18:04

## 2021-01-01 RX ADMIN — PRAVASTATIN SODIUM 20 MG: 20 TABLET ORAL at 08:40

## 2021-01-01 RX ADMIN — ZINC SULFATE 220 MG (50 MG) CAPSULE 50 MG: CAPSULE at 16:32

## 2021-01-01 RX ADMIN — PANTOPRAZOLE SODIUM 40 MG: 40 TABLET, DELAYED RELEASE ORAL at 08:55

## 2021-01-01 RX ADMIN — Medication 10 ML: at 09:55

## 2021-01-01 RX ADMIN — INSULIN GLARGINE 20 UNITS: 100 INJECTION, SOLUTION SUBCUTANEOUS at 20:53

## 2021-01-01 RX ADMIN — CISATRACURIUM BESYLATE 2 MCG/KG/MIN: 200 INJECTION, SOLUTION INTRAVENOUS at 01:21

## 2021-01-01 RX ADMIN — MINERAL OIL AND PETROLATUM: 150; 830 OINTMENT OPHTHALMIC at 07:20

## 2021-01-01 RX ADMIN — MINERAL OIL AND PETROLATUM: 150; 830 OINTMENT OPHTHALMIC at 17:05

## 2021-01-01 RX ADMIN — MINERAL OIL AND PETROLATUM: 150; 830 OINTMENT OPHTHALMIC at 22:02

## 2021-01-01 RX ADMIN — DEXAMETHASONE SODIUM PHOSPHATE 6 MG: 4 INJECTION, SOLUTION INTRA-ARTICULAR; INTRALESIONAL; INTRAMUSCULAR; INTRAVENOUS; SOFT TISSUE at 12:08

## 2021-01-01 RX ADMIN — ZINC SULFATE 220 MG (50 MG) CAPSULE 50 MG: CAPSULE at 08:44

## 2021-01-01 RX ADMIN — INSULIN LISPRO 15 UNITS: 100 INJECTION, SOLUTION INTRAVENOUS; SUBCUTANEOUS at 02:51

## 2021-01-01 RX ADMIN — INSULIN LISPRO 3 UNITS: 100 INJECTION, SOLUTION INTRAVENOUS; SUBCUTANEOUS at 21:39

## 2021-01-01 RX ADMIN — Medication 2000 UNITS: at 13:07

## 2021-01-01 RX ADMIN — INSULIN LISPRO 5 UNITS: 100 INJECTION, SOLUTION INTRAVENOUS; SUBCUTANEOUS at 10:04

## 2021-01-01 RX ADMIN — ZINC SULFATE 220 MG (50 MG) CAPSULE 50 MG: CAPSULE at 10:44

## 2021-01-01 RX ADMIN — GUAIFENESIN AND DEXTROMETHORPHAN 5 ML: 100; 10 SYRUP ORAL at 21:04

## 2021-01-01 RX ADMIN — NOREPINEPHRINE BITARTRATE 2 MCG/MIN: 1 INJECTION, SOLUTION, CONCENTRATE INTRAVENOUS at 15:00

## 2021-01-01 RX ADMIN — INSULIN LISPRO 14 UNITS: 100 INJECTION, SOLUTION INTRAVENOUS; SUBCUTANEOUS at 07:04

## 2021-01-01 RX ADMIN — CEFEPIME HYDROCHLORIDE 2000 MG: 2 INJECTION, POWDER, FOR SOLUTION INTRAVENOUS at 11:42

## 2021-01-01 RX ADMIN — ZINC SULFATE 220 MG (50 MG) CAPSULE 50 MG: CAPSULE at 08:27

## 2021-01-01 RX ADMIN — GUAIFENESIN AND DEXTROMETHORPHAN 5 ML: 100; 10 SYRUP ORAL at 16:33

## 2021-01-01 RX ADMIN — GUAIFENESIN AND DEXTROMETHORPHAN 5 ML: 100; 10 SYRUP ORAL at 04:39

## 2021-01-01 RX ADMIN — GUAIFENESIN AND DEXTROMETHORPHAN 5 ML: 100; 10 SYRUP ORAL at 17:05

## 2021-01-01 RX ADMIN — ENOXAPARIN SODIUM 40 MG: 40 INJECTION SUBCUTANEOUS at 08:15

## 2021-01-01 RX ADMIN — INSULIN LISPRO 10 UNITS: 100 INJECTION, SOLUTION INTRAVENOUS; SUBCUTANEOUS at 21:43

## 2021-01-01 RX ADMIN — HYDROCHLOROTHIAZIDE 25 MG: 25 TABLET ORAL at 08:04

## 2021-01-01 RX ADMIN — PANTOPRAZOLE SODIUM 40 MG: 40 INJECTION, POWDER, FOR SOLUTION INTRAVENOUS at 08:59

## 2021-01-01 RX ADMIN — INSULIN LISPRO 20 UNITS: 100 INJECTION, SOLUTION INTRAVENOUS; SUBCUTANEOUS at 18:35

## 2021-01-01 RX ADMIN — DEXAMETHASONE SODIUM PHOSPHATE 6 MG: 4 INJECTION, SOLUTION INTRA-ARTICULAR; INTRALESIONAL; INTRAMUSCULAR; INTRAVENOUS; SOFT TISSUE at 05:34

## 2021-01-01 RX ADMIN — Medication 175 MCG/HR: at 23:46

## 2021-01-01 RX ADMIN — ZINC SULFATE 220 MG (50 MG) CAPSULE 50 MG: CAPSULE at 08:40

## 2021-01-01 RX ADMIN — MINERAL OIL AND PETROLATUM: 150; 830 OINTMENT OPHTHALMIC at 05:22

## 2021-01-01 RX ADMIN — LEVOTHYROXINE SODIUM 150 MCG: 75 TABLET ORAL at 05:34

## 2021-01-01 RX ADMIN — DEXAMETHASONE SODIUM PHOSPHATE 6 MG: 4 INJECTION, SOLUTION INTRA-ARTICULAR; INTRALESIONAL; INTRAMUSCULAR; INTRAVENOUS; SOFT TISSUE at 05:45

## 2021-01-01 RX ADMIN — DEXAMETHASONE SODIUM PHOSPHATE 6 MG: 4 INJECTION, SOLUTION INTRA-ARTICULAR; INTRALESIONAL; INTRAMUSCULAR; INTRAVENOUS; SOFT TISSUE at 04:43

## 2021-01-01 RX ADMIN — PANTOPRAZOLE SODIUM 40 MG: 40 INJECTION, POWDER, FOR SOLUTION INTRAVENOUS at 05:13

## 2021-01-01 RX ADMIN — NYSTATIN 500000 UNITS: 500000 SUSPENSION ORAL at 20:25

## 2021-01-01 RX ADMIN — ALBUTEROL SULFATE 2 PUFF: 108 AEROSOL, METERED RESPIRATORY (INHALATION) at 08:04

## 2021-01-01 RX ADMIN — ALBUTEROL SULFATE 2.5 MG: 2.5 SOLUTION RESPIRATORY (INHALATION) at 20:25

## 2021-01-01 RX ADMIN — LOSARTAN POTASSIUM 100 MG: 50 TABLET, FILM COATED ORAL at 10:22

## 2021-01-01 RX ADMIN — NYSTATIN 500000 UNITS: 500000 SUSPENSION ORAL at 16:29

## 2021-01-01 RX ADMIN — PROPOFOL 15 MCG/KG/MIN: 10 INJECTION, EMULSION INTRAVENOUS at 04:22

## 2021-01-01 RX ADMIN — ENOXAPARIN SODIUM 40 MG: 40 INJECTION SUBCUTANEOUS at 10:31

## 2021-01-01 RX ADMIN — NYSTATIN 500000 UNITS: 500000 SUSPENSION ORAL at 13:43

## 2021-01-01 RX ADMIN — GUAIFENESIN AND DEXTROMETHORPHAN 5 ML: 100; 10 SYRUP ORAL at 10:40

## 2021-01-01 RX ADMIN — DEXAMETHASONE SODIUM PHOSPHATE 6 MG: 4 INJECTION, SOLUTION INTRA-ARTICULAR; INTRALESIONAL; INTRAMUSCULAR; INTRAVENOUS; SOFT TISSUE at 20:27

## 2021-01-01 RX ADMIN — ACETAMINOPHEN 650 MG: 325 TABLET ORAL at 13:43

## 2021-01-01 RX ADMIN — ALBUTEROL SULFATE 2.5 MG: 2.5 SOLUTION RESPIRATORY (INHALATION) at 10:01

## 2021-01-01 RX ADMIN — INSULIN LISPRO 10 UNITS: 100 INJECTION, SOLUTION INTRAVENOUS; SUBCUTANEOUS at 14:03

## 2021-01-01 RX ADMIN — INSULIN LISPRO 20 UNITS: 100 INJECTION, SOLUTION INTRAVENOUS; SUBCUTANEOUS at 13:45

## 2021-01-01 RX ADMIN — AMLODIPINE BESYLATE 5 MG: 5 TABLET ORAL at 09:58

## 2021-01-01 RX ADMIN — INSULIN LISPRO 8 UNITS: 100 INJECTION, SOLUTION INTRAVENOUS; SUBCUTANEOUS at 16:33

## 2021-01-01 RX ADMIN — Medication 10 ML: at 08:06

## 2021-01-01 RX ADMIN — INSULIN LISPRO 12 UNITS: 100 INJECTION, SOLUTION INTRAVENOUS; SUBCUTANEOUS at 06:54

## 2021-01-01 RX ADMIN — ALBUTEROL SULFATE 2.5 MG: 2.5 SOLUTION RESPIRATORY (INHALATION) at 11:56

## 2021-01-01 RX ADMIN — GUAIFENESIN AND DEXTROMETHORPHAN 5 ML: 100; 10 SYRUP ORAL at 06:31

## 2021-01-01 RX ADMIN — SODIUM CHLORIDE: 9 INJECTION, SOLUTION INTRAVENOUS at 02:17

## 2021-01-01 RX ADMIN — INSULIN LISPRO 6 UNITS: 100 INJECTION, SOLUTION INTRAVENOUS; SUBCUTANEOUS at 21:49

## 2021-01-01 RX ADMIN — ALBUTEROL SULFATE 2 PUFF: 108 AEROSOL, METERED RESPIRATORY (INHALATION) at 07:47

## 2021-01-01 RX ADMIN — DEXAMETHASONE SODIUM PHOSPHATE 6 MG: 4 INJECTION, SOLUTION INTRA-ARTICULAR; INTRALESIONAL; INTRAMUSCULAR; INTRAVENOUS; SOFT TISSUE at 07:45

## 2021-01-01 RX ADMIN — CHLORHEXIDINE GLUCONATE 0.12% ORAL RINSE 15 ML: 1.2 LIQUID ORAL at 07:59

## 2021-01-01 RX ADMIN — CHLORHEXIDINE GLUCONATE 0.12% ORAL RINSE 15 ML: 1.2 LIQUID ORAL at 21:50

## 2021-01-01 RX ADMIN — ALBUTEROL SULFATE 2.5 MG: 2.5 SOLUTION RESPIRATORY (INHALATION) at 12:39

## 2021-01-01 RX ADMIN — AMLODIPINE BESYLATE 5 MG: 5 TABLET ORAL at 10:44

## 2021-01-01 RX ADMIN — AMLODIPINE BESYLATE 5 MG: 5 TABLET ORAL at 09:30

## 2021-01-01 RX ADMIN — INSULIN LISPRO 12 UNITS: 100 INJECTION, SOLUTION INTRAVENOUS; SUBCUTANEOUS at 08:10

## 2021-01-01 RX ADMIN — ALBUTEROL SULFATE 2.5 MG: 2.5 SOLUTION RESPIRATORY (INHALATION) at 01:36

## 2021-01-01 RX ADMIN — KETOROLAC TROMETHAMINE 15 MG: 30 INJECTION, SOLUTION INTRAMUSCULAR at 08:09

## 2021-01-01 RX ADMIN — CISATRACURIUM BESYLATE 2 MCG/KG/MIN: 200 INJECTION, SOLUTION INTRAVENOUS at 22:10

## 2021-01-01 RX ADMIN — Medication 175 MCG/HR: at 21:48

## 2021-01-01 RX ADMIN — ZINC SULFATE 220 MG (50 MG) CAPSULE 50 MG: CAPSULE at 08:37

## 2021-01-01 RX ADMIN — ALBUTEROL SULFATE 2 PUFF: 108 AEROSOL, METERED RESPIRATORY (INHALATION) at 17:18

## 2021-01-01 RX ADMIN — Medication 500 MG: at 21:16

## 2021-01-01 RX ADMIN — NOREPINEPHRINE BITARTRATE 4 MCG/MIN: 1 INJECTION, SOLUTION, CONCENTRATE INTRAVENOUS at 18:50

## 2021-01-01 RX ADMIN — SODIUM ZIRCONIUM CYCLOSILICATE 5 G: 5 POWDER, FOR SUSPENSION ORAL at 23:30

## 2021-01-01 RX ADMIN — PROPOFOL 30 MCG/KG/MIN: 10 INJECTION, EMULSION INTRAVENOUS at 10:06

## 2021-01-01 RX ADMIN — DEXAMETHASONE SODIUM PHOSPHATE 6 MG: 4 INJECTION, SOLUTION INTRA-ARTICULAR; INTRALESIONAL; INTRAMUSCULAR; INTRAVENOUS; SOFT TISSUE at 20:53

## 2021-01-01 RX ADMIN — SODIUM ZIRCONIUM CYCLOSILICATE 5 G: 5 POWDER, FOR SUSPENSION ORAL at 10:01

## 2021-01-01 RX ADMIN — Medication 175 MCG/HR: at 04:43

## 2021-01-01 RX ADMIN — INSULIN LISPRO 8 UNITS: 100 INJECTION, SOLUTION INTRAVENOUS; SUBCUTANEOUS at 10:29

## 2021-01-01 RX ADMIN — PRAVASTATIN SODIUM 20 MG: 20 TABLET ORAL at 08:13

## 2021-01-01 RX ADMIN — INSULIN LISPRO 3 UNITS: 100 INJECTION, SOLUTION INTRAVENOUS; SUBCUTANEOUS at 20:48

## 2021-01-01 RX ADMIN — DEXAMETHASONE SODIUM PHOSPHATE 6 MG: 4 INJECTION, SOLUTION INTRA-ARTICULAR; INTRALESIONAL; INTRAMUSCULAR; INTRAVENOUS; SOFT TISSUE at 06:54

## 2021-01-01 RX ADMIN — ZINC SULFATE 220 MG (50 MG) CAPSULE 50 MG: CAPSULE at 09:31

## 2021-01-01 RX ADMIN — INSULIN LISPRO 7 UNITS: 100 INJECTION, SOLUTION INTRAVENOUS; SUBCUTANEOUS at 17:03

## 2021-01-01 RX ADMIN — Medication 10 ML: at 21:34

## 2021-01-01 RX ADMIN — HYDROCHLOROTHIAZIDE 25 MG: 25 TABLET ORAL at 09:51

## 2021-01-01 RX ADMIN — INSULIN LISPRO 25 UNITS: 100 INJECTION, SOLUTION INTRAVENOUS; SUBCUTANEOUS at 22:00

## 2021-01-01 RX ADMIN — SODIUM ZIRCONIUM CYCLOSILICATE 5 G: 5 POWDER, FOR SUSPENSION ORAL at 08:13

## 2021-01-01 RX ADMIN — INSULIN LISPRO 12 UNITS: 100 INJECTION, SOLUTION INTRAVENOUS; SUBCUTANEOUS at 14:09

## 2021-01-01 RX ADMIN — INSULIN LISPRO 12 UNITS: 100 INJECTION, SOLUTION INTRAVENOUS; SUBCUTANEOUS at 17:15

## 2021-01-01 RX ADMIN — INSULIN GLARGINE 50 UNITS: 100 INJECTION, SOLUTION SUBCUTANEOUS at 21:20

## 2021-01-01 RX ADMIN — INSULIN HUMAN 10 UNITS: 100 INJECTION, SOLUTION PARENTERAL at 09:11

## 2021-01-01 RX ADMIN — CISATRACURIUM BESYLATE 2 MCG/KG/MIN: 200 INJECTION, SOLUTION INTRAVENOUS at 22:49

## 2021-01-01 RX ADMIN — CHLORHEXIDINE GLUCONATE 0.12% ORAL RINSE 15 ML: 1.2 LIQUID ORAL at 21:03

## 2021-01-01 RX ADMIN — MINERAL OIL AND PETROLATUM: 150; 830 OINTMENT OPHTHALMIC at 17:41

## 2021-01-01 RX ADMIN — PRAVASTATIN SODIUM 20 MG: 20 TABLET ORAL at 08:18

## 2021-01-01 RX ADMIN — NYSTATIN 500000 UNITS: 500000 SUSPENSION ORAL at 12:28

## 2021-01-01 RX ADMIN — ENOXAPARIN SODIUM 40 MG: 40 INJECTION SUBCUTANEOUS at 21:34

## 2021-01-01 RX ADMIN — Medication 500 MG: at 09:58

## 2021-01-01 RX ADMIN — ZINC SULFATE 220 MG (50 MG) CAPSULE 50 MG: CAPSULE at 08:17

## 2021-01-01 RX ADMIN — NOREPINEPHRINE BITARTRATE 70 MCG/MIN: 1 INJECTION, SOLUTION, CONCENTRATE INTRAVENOUS at 12:31

## 2021-01-01 RX ADMIN — NYSTATIN 500000 UNITS: 500000 SUSPENSION ORAL at 20:21

## 2021-01-01 RX ADMIN — Medication 500 MG: at 08:13

## 2021-01-01 RX ADMIN — DEXAMETHASONE SODIUM PHOSPHATE 6 MG: 4 INJECTION, SOLUTION INTRA-ARTICULAR; INTRALESIONAL; INTRAMUSCULAR; INTRAVENOUS; SOFT TISSUE at 20:24

## 2021-01-01 RX ADMIN — CHLORHEXIDINE GLUCONATE 0.12% ORAL RINSE 15 ML: 1.2 LIQUID ORAL at 09:51

## 2021-01-01 RX ADMIN — ALBUTEROL SULFATE 2 PUFF: 108 AEROSOL, METERED RESPIRATORY (INHALATION) at 14:14

## 2021-01-01 RX ADMIN — Medication 175 MCG/HR: at 18:54

## 2021-01-01 RX ADMIN — DEXAMETHASONE SODIUM PHOSPHATE 6 MG: 4 INJECTION, SOLUTION INTRA-ARTICULAR; INTRALESIONAL; INTRAMUSCULAR; INTRAVENOUS; SOFT TISSUE at 16:08

## 2021-01-01 RX ADMIN — GUAIFENESIN AND DEXTROMETHORPHAN 5 ML: 100; 10 SYRUP ORAL at 21:26

## 2021-01-01 RX ADMIN — INSULIN LISPRO 16 UNITS: 100 INJECTION, SOLUTION INTRAVENOUS; SUBCUTANEOUS at 10:17

## 2021-01-01 RX ADMIN — INSULIN LISPRO 15 UNITS: 100 INJECTION, SOLUTION INTRAVENOUS; SUBCUTANEOUS at 17:29

## 2021-01-01 RX ADMIN — DEXAMETHASONE SODIUM PHOSPHATE 6 MG: 4 INJECTION, SOLUTION INTRA-ARTICULAR; INTRALESIONAL; INTRAMUSCULAR; INTRAVENOUS; SOFT TISSUE at 19:45

## 2021-01-01 RX ADMIN — PRAVASTATIN SODIUM 20 MG: 20 TABLET ORAL at 09:56

## 2021-01-01 RX ADMIN — INSULIN LISPRO 12 UNITS: 100 INJECTION, SOLUTION INTRAVENOUS; SUBCUTANEOUS at 11:55

## 2021-01-01 RX ADMIN — SODIUM ZIRCONIUM CYCLOSILICATE 5 G: 5 POWDER, FOR SUSPENSION ORAL at 21:12

## 2021-01-01 RX ADMIN — DEXAMETHASONE SODIUM PHOSPHATE 6 MG: 4 INJECTION, SOLUTION INTRA-ARTICULAR; INTRALESIONAL; INTRAMUSCULAR; INTRAVENOUS; SOFT TISSUE at 10:06

## 2021-01-01 RX ADMIN — CISATRACURIUM BESYLATE 2 MCG/KG/MIN: 200 INJECTION, SOLUTION INTRAVENOUS at 16:59

## 2021-01-01 RX ADMIN — SODIUM CHLORIDE, PRESERVATIVE FREE 10 ML: 5 INJECTION INTRAVENOUS at 18:39

## 2021-01-01 RX ADMIN — Medication 10 ML: at 08:37

## 2021-01-01 RX ADMIN — AMLODIPINE BESYLATE 5 MG: 5 TABLET ORAL at 08:45

## 2021-01-01 RX ADMIN — DEXTROSE MONOHYDRATE 25 G: 25 INJECTION, SOLUTION INTRAVENOUS at 12:58

## 2021-01-01 RX ADMIN — MINERAL OIL AND PETROLATUM: 150; 830 OINTMENT OPHTHALMIC at 00:42

## 2021-01-01 RX ADMIN — CHLORHEXIDINE GLUCONATE 0.12% ORAL RINSE 15 ML: 1.2 LIQUID ORAL at 08:05

## 2021-01-01 RX ADMIN — SODIUM ZIRCONIUM CYCLOSILICATE 5 G: 5 POWDER, FOR SUSPENSION ORAL at 13:50

## 2021-01-01 RX ADMIN — MINERAL OIL AND PETROLATUM: 150; 830 OINTMENT OPHTHALMIC at 00:00

## 2021-01-01 RX ADMIN — PRAVASTATIN SODIUM 20 MG: 20 TABLET ORAL at 10:39

## 2021-01-01 RX ADMIN — Medication 500 MG: at 21:34

## 2021-01-01 RX ADMIN — PANTOPRAZOLE SODIUM 40 MG: 40 TABLET, DELAYED RELEASE ORAL at 08:28

## 2021-01-01 RX ADMIN — GUAIFENESIN AND DEXTROMETHORPHAN 5 ML: 100; 10 SYRUP ORAL at 17:00

## 2021-01-01 RX ADMIN — ZINC SULFATE 220 MG (50 MG) CAPSULE 50 MG: CAPSULE at 10:11

## 2021-01-01 RX ADMIN — LEVOTHYROXINE SODIUM 150 MCG: 75 TABLET ORAL at 05:47

## 2021-01-01 RX ADMIN — INSULIN LISPRO 12 UNITS: 100 INJECTION, SOLUTION INTRAVENOUS; SUBCUTANEOUS at 16:00

## 2021-01-01 RX ADMIN — ALBUTEROL SULFATE 2.5 MG: 2.5 SOLUTION RESPIRATORY (INHALATION) at 08:26

## 2021-01-01 RX ADMIN — ENOXAPARIN SODIUM 40 MG: 40 INJECTION SUBCUTANEOUS at 20:40

## 2021-01-01 RX ADMIN — SODIUM CHLORIDE, PRESERVATIVE FREE 10 ML: 5 INJECTION INTRAVENOUS at 17:19

## 2021-01-01 RX ADMIN — INSULIN LISPRO 7 UNITS: 100 INJECTION, SOLUTION INTRAVENOUS; SUBCUTANEOUS at 10:11

## 2021-01-01 RX ADMIN — NYSTATIN 500000 UNITS: 500000 SUSPENSION ORAL at 19:46

## 2021-01-01 RX ADMIN — LEVOTHYROXINE SODIUM 150 MCG: 75 TABLET ORAL at 06:55

## 2021-01-01 RX ADMIN — GUAIFENESIN AND DEXTROMETHORPHAN 5 ML: 100; 10 SYRUP ORAL at 09:58

## 2021-01-01 RX ADMIN — ENOXAPARIN SODIUM 40 MG: 40 INJECTION SUBCUTANEOUS at 20:26

## 2021-01-01 RX ADMIN — INSULIN LISPRO 9 UNITS: 100 INJECTION, SOLUTION INTRAVENOUS; SUBCUTANEOUS at 01:15

## 2021-01-01 RX ADMIN — PROPOFOL 15 MCG/KG/MIN: 10 INJECTION, EMULSION INTRAVENOUS at 14:36

## 2021-01-01 RX ADMIN — INSULIN LISPRO 3 UNITS: 100 INJECTION, SOLUTION INTRAVENOUS; SUBCUTANEOUS at 17:07

## 2021-01-01 RX ADMIN — Medication 500 MG: at 08:59

## 2021-01-01 RX ADMIN — INSULIN LISPRO 10 UNITS: 100 INJECTION, SOLUTION INTRAVENOUS; SUBCUTANEOUS at 02:00

## 2021-01-01 RX ADMIN — ALBUTEROL SULFATE 2 PUFF: 108 AEROSOL, METERED RESPIRATORY (INHALATION) at 21:49

## 2021-01-01 RX ADMIN — INSULIN LISPRO 12 UNITS: 100 INJECTION, SOLUTION INTRAVENOUS; SUBCUTANEOUS at 14:00

## 2021-01-01 RX ADMIN — ALBUTEROL SULFATE 2.5 MG: 2.5 SOLUTION RESPIRATORY (INHALATION) at 11:47

## 2021-01-01 RX ADMIN — ALBUTEROL SULFATE 2 PUFF: 108 AEROSOL, METERED RESPIRATORY (INHALATION) at 20:28

## 2021-01-01 RX ADMIN — ENOXAPARIN SODIUM 40 MG: 40 INJECTION SUBCUTANEOUS at 09:58

## 2021-01-01 RX ADMIN — Medication 500 MG: at 21:32

## 2021-01-01 RX ADMIN — INSULIN LISPRO 7 UNITS: 100 INJECTION, SOLUTION INTRAVENOUS; SUBCUTANEOUS at 20:47

## 2021-01-01 RX ADMIN — ENOXAPARIN SODIUM 40 MG: 40 INJECTION SUBCUTANEOUS at 08:54

## 2021-01-01 RX ADMIN — ALBUTEROL SULFATE 2 PUFF: 108 AEROSOL, METERED RESPIRATORY (INHALATION) at 19:45

## 2021-01-01 RX ADMIN — DEXAMETHASONE SODIUM PHOSPHATE 6 MG: 4 INJECTION, SOLUTION INTRA-ARTICULAR; INTRALESIONAL; INTRAMUSCULAR; INTRAVENOUS; SOFT TISSUE at 16:53

## 2021-01-01 RX ADMIN — INSULIN GLARGINE 20 UNITS: 100 INJECTION, SOLUTION SUBCUTANEOUS at 21:34

## 2021-01-01 RX ADMIN — SODIUM ZIRCONIUM CYCLOSILICATE 5 G: 5 POWDER, FOR SUSPENSION ORAL at 15:28

## 2021-01-01 RX ADMIN — GUAIFENESIN AND DEXTROMETHORPHAN 5 ML: 100; 10 SYRUP ORAL at 21:39

## 2021-01-01 RX ADMIN — LEVOTHYROXINE SODIUM 150 MCG: 75 TABLET ORAL at 10:10

## 2021-01-01 RX ADMIN — GUAIFENESIN AND DEXTROMETHORPHAN 5 ML: 100; 10 SYRUP ORAL at 10:34

## 2021-01-01 RX ADMIN — GUAIFENESIN AND DEXTROMETHORPHAN 5 ML: 100; 10 SYRUP ORAL at 17:19

## 2021-01-01 RX ADMIN — Medication 500 MG: at 20:00

## 2021-01-01 RX ADMIN — ZINC SULFATE 220 MG (50 MG) CAPSULE 50 MG: CAPSULE at 08:05

## 2021-01-01 RX ADMIN — ENOXAPARIN SODIUM 40 MG: 40 INJECTION SUBCUTANEOUS at 21:25

## 2021-01-01 RX ADMIN — ZINC SULFATE 220 MG (50 MG) CAPSULE 50 MG: CAPSULE at 10:09

## 2021-01-01 RX ADMIN — Medication 175 MCG/HR: at 13:57

## 2021-01-01 RX ADMIN — PHENYLEPHRINE HYDROCHLORIDE 300 MCG/MIN: 10 INJECTION INTRAVENOUS at 15:45

## 2021-01-01 RX ADMIN — ENOXAPARIN SODIUM 40 MG: 40 INJECTION SUBCUTANEOUS at 21:38

## 2021-01-01 RX ADMIN — HYDROCHLOROTHIAZIDE 25 MG: 25 TABLET ORAL at 11:29

## 2021-01-01 RX ADMIN — PROPOFOL 15 MCG/KG/MIN: 10 INJECTION, EMULSION INTRAVENOUS at 03:21

## 2021-01-01 RX ADMIN — AMLODIPINE BESYLATE 5 MG: 5 TABLET ORAL at 08:07

## 2021-01-01 RX ADMIN — ALBUTEROL SULFATE 2 PUFF: 108 AEROSOL, METERED RESPIRATORY (INHALATION) at 10:15

## 2021-01-01 RX ADMIN — NYSTATIN 500000 UNITS: 500000 SUSPENSION ORAL at 18:21

## 2021-01-01 RX ADMIN — INSULIN LISPRO 6 UNITS: 100 INJECTION, SOLUTION INTRAVENOUS; SUBCUTANEOUS at 20:54

## 2021-01-01 RX ADMIN — ACETAMINOPHEN 650 MG: 325 TABLET ORAL at 12:28

## 2021-01-01 RX ADMIN — DEXAMETHASONE SODIUM PHOSPHATE 6 MG: 4 INJECTION, SOLUTION INTRA-ARTICULAR; INTRALESIONAL; INTRAMUSCULAR; INTRAVENOUS; SOFT TISSUE at 21:04

## 2021-01-01 RX ADMIN — ALBUTEROL SULFATE 2 PUFF: 108 AEROSOL, METERED RESPIRATORY (INHALATION) at 16:54

## 2021-01-01 RX ADMIN — CISATRACURIUM BESYLATE 2 MCG/KG/MIN: 200 INJECTION, SOLUTION INTRAVENOUS at 12:01

## 2021-01-01 RX ADMIN — DEXAMETHASONE SODIUM PHOSPHATE 6 MG: 4 INJECTION, SOLUTION INTRA-ARTICULAR; INTRALESIONAL; INTRAMUSCULAR; INTRAVENOUS; SOFT TISSUE at 21:39

## 2021-01-01 RX ADMIN — Medication 10 ML: at 20:26

## 2021-01-01 RX ADMIN — ENOXAPARIN SODIUM 40 MG: 40 INJECTION SUBCUTANEOUS at 21:32

## 2021-01-01 RX ADMIN — INSULIN LISPRO 12 UNITS: 100 INJECTION, SOLUTION INTRAVENOUS; SUBCUTANEOUS at 17:00

## 2021-01-01 RX ADMIN — GUAIFENESIN AND DEXTROMETHORPHAN 5 ML: 100; 10 SYRUP ORAL at 09:54

## 2021-01-01 RX ADMIN — Medication 500 MG: at 08:05

## 2021-01-01 RX ADMIN — ALBUTEROL SULFATE 2 PUFF: 108 AEROSOL, METERED RESPIRATORY (INHALATION) at 09:00

## 2021-01-01 RX ADMIN — INSULIN LISPRO 7 UNITS: 100 INJECTION, SOLUTION INTRAVENOUS; SUBCUTANEOUS at 17:00

## 2021-01-01 RX ADMIN — ALBUTEROL SULFATE 2 PUFF: 108 AEROSOL, METERED RESPIRATORY (INHALATION) at 21:39

## 2021-01-01 RX ADMIN — NYSTATIN 500000 UNITS: 500000 SUSPENSION ORAL at 09:58

## 2021-01-01 RX ADMIN — MINERAL OIL AND PETROLATUM: 150; 830 OINTMENT OPHTHALMIC at 12:15

## 2021-01-01 RX ADMIN — LEVOTHYROXINE SODIUM 150 MCG: 75 TABLET ORAL at 07:05

## 2021-01-01 RX ADMIN — Medication 500 MG: at 21:49

## 2021-01-01 RX ADMIN — HYDROCHLOROTHIAZIDE 25 MG: 25 TABLET ORAL at 10:43

## 2021-01-01 RX ADMIN — Medication 10 ML: at 21:24

## 2021-01-01 RX ADMIN — INSULIN LISPRO 6 UNITS: 100 INJECTION, SOLUTION INTRAVENOUS; SUBCUTANEOUS at 17:01

## 2021-01-01 RX ADMIN — INSULIN GLARGINE 10 UNITS: 100 INJECTION, SOLUTION SUBCUTANEOUS at 21:59

## 2021-01-01 RX ADMIN — PROPOFOL 10 MCG/KG/MIN: 10 INJECTION, EMULSION INTRAVENOUS at 04:26

## 2021-01-01 RX ADMIN — INSULIN LISPRO 3 UNITS: 100 INJECTION, SOLUTION INTRAVENOUS; SUBCUTANEOUS at 22:38

## 2021-01-01 RX ADMIN — SODIUM CHLORIDE: 9 INJECTION, SOLUTION INTRAVENOUS at 10:42

## 2021-01-01 RX ADMIN — DEXAMETHASONE SODIUM PHOSPHATE 6 MG: 4 INJECTION, SOLUTION INTRA-ARTICULAR; INTRALESIONAL; INTRAMUSCULAR; INTRAVENOUS; SOFT TISSUE at 16:21

## 2021-01-01 RX ADMIN — GUAIFENESIN AND DEXTROMETHORPHAN 5 ML: 100; 10 SYRUP ORAL at 17:07

## 2021-01-01 RX ADMIN — CHLORHEXIDINE GLUCONATE 0.12% ORAL RINSE 15 ML: 1.2 LIQUID ORAL at 21:35

## 2021-01-01 RX ADMIN — Medication 500 MG: at 21:23

## 2021-01-01 RX ADMIN — DEXAMETHASONE SODIUM PHOSPHATE 6 MG: 4 INJECTION, SOLUTION INTRA-ARTICULAR; INTRALESIONAL; INTRAMUSCULAR; INTRAVENOUS; SOFT TISSUE at 03:33

## 2021-01-01 RX ADMIN — LOSARTAN POTASSIUM 100 MG: 50 TABLET, FILM COATED ORAL at 08:55

## 2021-01-01 RX ADMIN — ZINC SULFATE 220 MG (50 MG) CAPSULE 50 MG: CAPSULE at 08:59

## 2021-01-01 RX ADMIN — INSULIN GLARGINE 40 UNITS: 100 INJECTION, SOLUTION SUBCUTANEOUS at 21:20

## 2021-01-01 RX ADMIN — PANTOPRAZOLE SODIUM 40 MG: 40 TABLET, DELAYED RELEASE ORAL at 12:07

## 2021-01-01 RX ADMIN — DEXAMETHASONE SODIUM PHOSPHATE 6 MG: 4 INJECTION, SOLUTION INTRA-ARTICULAR; INTRALESIONAL; INTRAMUSCULAR; INTRAVENOUS; SOFT TISSUE at 21:12

## 2021-01-01 RX ADMIN — INSULIN LISPRO 15 UNITS: 100 INJECTION, SOLUTION INTRAVENOUS; SUBCUTANEOUS at 11:49

## 2021-01-01 RX ADMIN — AMLODIPINE BESYLATE 5 MG: 5 TABLET ORAL at 08:55

## 2021-01-01 RX ADMIN — MINERAL OIL AND PETROLATUM: 150; 830 OINTMENT OPHTHALMIC at 21:51

## 2021-01-01 RX ADMIN — ALBUTEROL SULFATE 2.5 MG: 2.5 SOLUTION RESPIRATORY (INHALATION) at 20:44

## 2021-01-01 RX ADMIN — ENOXAPARIN SODIUM 40 MG: 40 INJECTION SUBCUTANEOUS at 08:41

## 2021-01-01 RX ADMIN — Medication 10 ML: at 21:00

## 2021-01-01 RX ADMIN — Medication 500 MG: at 10:11

## 2021-01-01 RX ADMIN — ALBUTEROL SULFATE 2 PUFF: 108 AEROSOL, METERED RESPIRATORY (INHALATION) at 08:44

## 2021-01-01 RX ADMIN — REMDESIVIR 100 MG: 100 INJECTION, POWDER, LYOPHILIZED, FOR SOLUTION INTRAVENOUS at 15:34

## 2021-01-01 RX ADMIN — INSULIN GLARGINE 40 UNITS: 100 INJECTION, SOLUTION SUBCUTANEOUS at 21:56

## 2021-01-01 RX ADMIN — GUAIFENESIN AND DEXTROMETHORPHAN 5 ML: 100; 10 SYRUP ORAL at 10:00

## 2021-01-01 RX ADMIN — Medication 2000 UNITS: at 08:13

## 2021-01-01 RX ADMIN — Medication 100 MCG/HR: at 14:53

## 2021-01-01 RX ADMIN — ENOXAPARIN SODIUM 40 MG: 40 INJECTION SUBCUTANEOUS at 20:49

## 2021-01-01 RX ADMIN — MINERAL OIL AND PETROLATUM: 150; 830 OINTMENT OPHTHALMIC at 06:07

## 2021-01-01 RX ADMIN — INSULIN LISPRO 4 UNITS: 100 INJECTION, SOLUTION INTRAVENOUS; SUBCUTANEOUS at 06:05

## 2021-01-01 RX ADMIN — INSULIN LISPRO 5 UNITS: 100 INJECTION, SOLUTION INTRAVENOUS; SUBCUTANEOUS at 16:35

## 2021-01-01 RX ADMIN — LEVOFLOXACIN 750 MG: 500 TABLET, FILM COATED ORAL at 09:30

## 2021-01-01 RX ADMIN — Medication 175 MCG/HR: at 21:49

## 2021-01-01 RX ADMIN — NYSTATIN 500000 UNITS: 500000 SUSPENSION ORAL at 14:31

## 2021-01-01 RX ADMIN — ZINC SULFATE 220 MG (50 MG) CAPSULE 50 MG: CAPSULE at 10:31

## 2021-01-01 RX ADMIN — INSULIN LISPRO 3 UNITS: 100 INJECTION, SOLUTION INTRAVENOUS; SUBCUTANEOUS at 07:09

## 2021-01-01 RX ADMIN — MINERAL OIL AND PETROLATUM: 150; 830 OINTMENT OPHTHALMIC at 11:58

## 2021-01-01 RX ADMIN — DEXAMETHASONE SODIUM PHOSPHATE 6 MG: 4 INJECTION, SOLUTION INTRA-ARTICULAR; INTRALESIONAL; INTRAMUSCULAR; INTRAVENOUS; SOFT TISSUE at 22:56

## 2021-01-01 RX ADMIN — GUAIFENESIN AND DEXTROMETHORPHAN 5 ML: 100; 10 SYRUP ORAL at 11:53

## 2021-01-01 RX ADMIN — INSULIN LISPRO 4 UNITS: 100 INJECTION, SOLUTION INTRAVENOUS; SUBCUTANEOUS at 16:29

## 2021-01-01 RX ADMIN — CHLORHEXIDINE GLUCONATE 0.12% ORAL RINSE 15 ML: 1.2 LIQUID ORAL at 22:16

## 2021-01-01 RX ADMIN — Medication 500 MG: at 08:40

## 2021-01-01 RX ADMIN — ENOXAPARIN SODIUM 50 MG: 60 INJECTION SUBCUTANEOUS at 09:28

## 2021-01-01 RX ADMIN — Medication 10 ML: at 10:37

## 2021-01-01 RX ADMIN — NYSTATIN 500000 UNITS: 500000 SUSPENSION ORAL at 16:17

## 2021-01-01 RX ADMIN — Medication 2000 UNITS: at 10:09

## 2021-01-01 RX ADMIN — INSULIN LISPRO 4 UNITS: 100 INJECTION, SOLUTION INTRAVENOUS; SUBCUTANEOUS at 22:19

## 2021-01-01 RX ADMIN — LEVOTHYROXINE SODIUM 150 MCG: 75 TABLET ORAL at 06:31

## 2021-01-01 RX ADMIN — Medication 500 MG: at 19:46

## 2021-01-01 RX ADMIN — Medication 175 MCG/HR: at 15:33

## 2021-01-01 RX ADMIN — PANTOPRAZOLE SODIUM 40 MG: 40 INJECTION, POWDER, FOR SOLUTION INTRAVENOUS at 08:43

## 2021-01-01 RX ADMIN — SODIUM CHLORIDE, PRESERVATIVE FREE 10 ML: 5 INJECTION INTRAVENOUS at 08:05

## 2021-01-01 RX ADMIN — ACETAMINOPHEN 650 MG: 325 TABLET ORAL at 11:33

## 2021-01-01 RX ADMIN — Medication 2000 UNITS: at 08:40

## 2021-01-01 RX ADMIN — CHLORHEXIDINE GLUCONATE 0.12% ORAL RINSE 15 ML: 1.2 LIQUID ORAL at 20:54

## 2021-01-01 RX ADMIN — INSULIN LISPRO 7 UNITS: 100 INJECTION, SOLUTION INTRAVENOUS; SUBCUTANEOUS at 07:10

## 2021-01-01 RX ADMIN — Medication 175 MCG/HR: at 19:15

## 2021-01-01 RX ADMIN — INSULIN LISPRO 9 UNITS: 100 INJECTION, SOLUTION INTRAVENOUS; SUBCUTANEOUS at 17:53

## 2021-01-01 RX ADMIN — ALBUTEROL SULFATE 2.5 MG: 2.5 SOLUTION RESPIRATORY (INHALATION) at 16:24

## 2021-01-01 RX ADMIN — Medication 10 ML: at 20:30

## 2021-01-01 RX ADMIN — INSULIN GLARGINE 35 UNITS: 100 INJECTION, SOLUTION SUBCUTANEOUS at 09:36

## 2021-01-01 RX ADMIN — ALBUTEROL SULFATE 2.5 MG: 2.5 SOLUTION RESPIRATORY (INHALATION) at 15:31

## 2021-01-01 RX ADMIN — INSULIN LISPRO 6 UNITS: 100 INJECTION, SOLUTION INTRAVENOUS; SUBCUTANEOUS at 06:06

## 2021-01-01 RX ADMIN — PRAVASTATIN SODIUM 20 MG: 20 TABLET ORAL at 08:44

## 2021-01-01 RX ADMIN — ENOXAPARIN SODIUM 50 MG: 60 INJECTION SUBCUTANEOUS at 09:17

## 2021-01-01 RX ADMIN — INSULIN LISPRO 10 UNITS: 100 INJECTION, SOLUTION INTRAVENOUS; SUBCUTANEOUS at 18:16

## 2021-01-01 RX ADMIN — LEVOFLOXACIN 750 MG: 500 TABLET, FILM COATED ORAL at 13:44

## 2021-01-01 RX ADMIN — AMLODIPINE BESYLATE 5 MG: 5 TABLET ORAL at 10:25

## 2021-01-01 RX ADMIN — HYDROCHLOROTHIAZIDE 25 MG: 25 TABLET ORAL at 10:11

## 2021-01-01 RX ADMIN — GUAIFENESIN AND DEXTROMETHORPHAN 5 ML: 100; 10 SYRUP ORAL at 23:40

## 2021-01-01 RX ADMIN — INSULIN LISPRO 14 UNITS: 100 INJECTION, SOLUTION INTRAVENOUS; SUBCUTANEOUS at 03:34

## 2021-01-01 RX ADMIN — INSULIN LISPRO 28 UNITS: 100 INJECTION, SOLUTION INTRAVENOUS; SUBCUTANEOUS at 21:11

## 2021-01-01 RX ADMIN — ALBUTEROL SULFATE 2.5 MG: 2.5 SOLUTION RESPIRATORY (INHALATION) at 17:16

## 2021-01-01 RX ADMIN — Medication 175 MCG/HR: at 00:34

## 2021-01-01 RX ADMIN — Medication 500 MG: at 08:44

## 2021-01-01 RX ADMIN — ALBUTEROL SULFATE 2 PUFF: 108 AEROSOL, METERED RESPIRATORY (INHALATION) at 13:22

## 2021-01-01 RX ADMIN — Medication 2000 UNITS: at 08:44

## 2021-01-01 RX ADMIN — PROPOFOL 15 MCG/KG/MIN: 10 INJECTION, EMULSION INTRAVENOUS at 16:31

## 2021-01-01 RX ADMIN — Medication 175 MCG/HR: at 11:55

## 2021-01-01 RX ADMIN — DEXAMETHASONE SODIUM PHOSPHATE 6 MG: 4 INJECTION, SOLUTION INTRA-ARTICULAR; INTRALESIONAL; INTRAMUSCULAR; INTRAVENOUS; SOFT TISSUE at 17:13

## 2021-01-01 RX ADMIN — GUAIFENESIN AND DEXTROMETHORPHAN 5 ML: 100; 10 SYRUP ORAL at 11:17

## 2021-01-01 RX ADMIN — ALBUTEROL SULFATE 2 PUFF: 108 AEROSOL, METERED RESPIRATORY (INHALATION) at 20:50

## 2021-01-01 RX ADMIN — Medication 10 ML: at 20:43

## 2021-01-01 RX ADMIN — Medication 500 MG: at 09:31

## 2021-01-01 RX ADMIN — ENOXAPARIN SODIUM 40 MG: 40 INJECTION SUBCUTANEOUS at 08:44

## 2021-01-01 RX ADMIN — ALBUTEROL SULFATE 2.5 MG: 2.5 SOLUTION RESPIRATORY (INHALATION) at 13:03

## 2021-01-01 RX ADMIN — GUAIFENESIN AND DEXTROMETHORPHAN 5 ML: 100; 10 SYRUP ORAL at 09:39

## 2021-01-01 RX ADMIN — PHENYLEPHRINE HYDROCHLORIDE 300 MCG/MIN: 10 INJECTION INTRAVENOUS at 07:23

## 2021-01-01 RX ADMIN — PHENYLEPHRINE HYDROCHLORIDE 300 MCG/MIN: 10 INJECTION INTRAVENOUS at 10:15

## 2021-01-01 RX ADMIN — Medication 500 MG: at 08:55

## 2021-01-01 RX ADMIN — GUAIFENESIN AND DEXTROMETHORPHAN 5 ML: 100; 10 SYRUP ORAL at 21:33

## 2021-01-01 RX ADMIN — Medication 175 MCG/HR: at 06:47

## 2021-01-01 RX ADMIN — DEXAMETHASONE SODIUM PHOSPHATE 6 MG: 4 INJECTION, SOLUTION INTRA-ARTICULAR; INTRALESIONAL; INTRAMUSCULAR; INTRAVENOUS; SOFT TISSUE at 10:11

## 2021-01-01 RX ADMIN — DEXMEDETOMIDINE HYDROCHLORIDE 0.4 MCG/KG/HR: 100 INJECTION, SOLUTION INTRAVENOUS at 13:21

## 2021-01-01 RX ADMIN — Medication 10 ML: at 20:53

## 2021-01-01 RX ADMIN — INSULIN LISPRO 7 UNITS: 100 INJECTION, SOLUTION INTRAVENOUS; SUBCUTANEOUS at 17:17

## 2021-01-01 RX ADMIN — CHLORHEXIDINE GLUCONATE 0.12% ORAL RINSE 15 ML: 1.2 LIQUID ORAL at 08:29

## 2021-01-01 RX ADMIN — PRAVASTATIN SODIUM 20 MG: 20 TABLET ORAL at 13:07

## 2021-01-01 RX ADMIN — ALBUTEROL SULFATE 2 PUFF: 108 AEROSOL, METERED RESPIRATORY (INHALATION) at 16:17

## 2021-01-01 RX ADMIN — ACETAMINOPHEN 650 MG: 325 TABLET ORAL at 05:58

## 2021-01-01 RX ADMIN — LOSARTAN POTASSIUM 100 MG: 50 TABLET, FILM COATED ORAL at 09:31

## 2021-01-01 RX ADMIN — INSULIN LISPRO 6 UNITS: 100 INJECTION, SOLUTION INTRAVENOUS; SUBCUTANEOUS at 06:08

## 2021-01-01 RX ADMIN — INSULIN LISPRO 20 UNITS: 100 INJECTION, SOLUTION INTRAVENOUS; SUBCUTANEOUS at 18:41

## 2021-01-01 RX ADMIN — VASOPRESSIN 0.08 UNITS/MIN: 20 INJECTION INTRAVENOUS at 14:09

## 2021-01-01 RX ADMIN — ALBUTEROL SULFATE 2 PUFF: 108 AEROSOL, METERED RESPIRATORY (INHALATION) at 12:06

## 2021-01-01 RX ADMIN — LEVOFLOXACIN 750 MG: 500 TABLET, FILM COATED ORAL at 10:43

## 2021-01-01 RX ADMIN — CISATRACURIUM BESYLATE 2 MCG/KG/MIN: 200 INJECTION, SOLUTION INTRAVENOUS at 19:26

## 2021-01-01 RX ADMIN — ALBUTEROL SULFATE 2.5 MG: 2.5 SOLUTION RESPIRATORY (INHALATION) at 15:39

## 2021-01-01 RX ADMIN — INSULIN GLARGINE 35 UNITS: 100 INJECTION, SOLUTION SUBCUTANEOUS at 08:39

## 2021-01-01 RX ADMIN — IOPAMIDOL 75 ML: 755 INJECTION, SOLUTION INTRAVENOUS at 19:05

## 2021-01-01 RX ADMIN — ALBUTEROL SULFATE 2.5 MG: 2.5 SOLUTION RESPIRATORY (INHALATION) at 13:22

## 2021-01-01 RX ADMIN — Medication 10 ML: at 21:52

## 2021-01-01 RX ADMIN — PRAVASTATIN SODIUM 20 MG: 20 TABLET ORAL at 08:36

## 2021-01-01 RX ADMIN — Medication 500 MG: at 09:51

## 2021-01-01 RX ADMIN — INSULIN LISPRO 5 UNITS: 100 INJECTION, SOLUTION INTRAVENOUS; SUBCUTANEOUS at 21:35

## 2021-01-01 RX ADMIN — AMLODIPINE BESYLATE 5 MG: 5 TABLET ORAL at 09:54

## 2021-01-01 RX ADMIN — ENOXAPARIN SODIUM 40 MG: 40 INJECTION SUBCUTANEOUS at 09:30

## 2021-01-01 RX ADMIN — Medication 10 ML: at 22:18

## 2021-01-01 RX ADMIN — INSULIN LISPRO 18 UNITS: 100 INJECTION, SOLUTION INTRAVENOUS; SUBCUTANEOUS at 08:55

## 2021-01-01 RX ADMIN — Medication 100 MCG/HR: at 11:56

## 2021-01-01 RX ADMIN — PROPOFOL 15 MCG/KG/MIN: 10 INJECTION, EMULSION INTRAVENOUS at 11:54

## 2021-01-01 RX ADMIN — Medication 2000 UNITS: at 16:32

## 2021-01-01 RX ADMIN — MINERAL OIL AND PETROLATUM: 150; 830 OINTMENT OPHTHALMIC at 18:05

## 2021-01-01 RX ADMIN — Medication 2000 UNITS: at 08:17

## 2021-01-01 RX ADMIN — SODIUM CHLORIDE: 9 INJECTION, SOLUTION INTRAVENOUS at 06:05

## 2021-01-01 RX ADMIN — BARICITINIB 2 MG: 2 TABLET, FILM COATED ORAL at 08:05

## 2021-01-01 RX ADMIN — DEXAMETHASONE SODIUM PHOSPHATE 6 MG: 4 INJECTION, SOLUTION INTRA-ARTICULAR; INTRALESIONAL; INTRAMUSCULAR; INTRAVENOUS; SOFT TISSUE at 10:08

## 2021-01-01 RX ADMIN — NYSTATIN 500000 UNITS: 500000 SUSPENSION ORAL at 09:55

## 2021-01-01 RX ADMIN — DEXAMETHASONE SODIUM PHOSPHATE 6 MG: 4 INJECTION, SOLUTION INTRA-ARTICULAR; INTRALESIONAL; INTRAMUSCULAR; INTRAVENOUS; SOFT TISSUE at 21:49

## 2021-01-01 RX ADMIN — LOSARTAN POTASSIUM 100 MG: 50 TABLET, FILM COATED ORAL at 10:11

## 2021-01-01 RX ADMIN — HYDROCHLOROTHIAZIDE 25 MG: 25 TABLET ORAL at 08:54

## 2021-01-01 RX ADMIN — Medication 10 ML: at 21:17

## 2021-01-01 RX ADMIN — INSULIN LISPRO 15 UNITS: 100 INJECTION, SOLUTION INTRAVENOUS; SUBCUTANEOUS at 06:10

## 2021-01-01 RX ADMIN — GUAIFENESIN AND DEXTROMETHORPHAN 5 ML: 100; 10 SYRUP ORAL at 21:17

## 2021-01-01 RX ADMIN — INSULIN GLARGINE 10 UNITS: 100 INJECTION, SOLUTION SUBCUTANEOUS at 22:07

## 2021-01-01 RX ADMIN — PRAVASTATIN SODIUM 20 MG: 20 TABLET ORAL at 08:55

## 2021-01-01 RX ADMIN — GUAIFENESIN AND DEXTROMETHORPHAN 5 ML: 100; 10 SYRUP ORAL at 04:13

## 2021-01-01 RX ADMIN — GUAIFENESIN AND DEXTROMETHORPHAN 5 ML: 100; 10 SYRUP ORAL at 15:34

## 2021-01-01 RX ADMIN — VANCOMYCIN HYDROCHLORIDE 1750 MG: 5 INJECTION, POWDER, LYOPHILIZED, FOR SOLUTION INTRAVENOUS at 12:16

## 2021-01-01 RX ADMIN — INSULIN GLARGINE 20 UNITS: 100 INJECTION, SOLUTION SUBCUTANEOUS at 21:47

## 2021-01-01 RX ADMIN — ENOXAPARIN SODIUM 50 MG: 60 INJECTION SUBCUTANEOUS at 20:20

## 2021-01-01 RX ADMIN — DEXAMETHASONE SODIUM PHOSPHATE 6 MG: 4 INJECTION, SOLUTION INTRA-ARTICULAR; INTRALESIONAL; INTRAMUSCULAR; INTRAVENOUS; SOFT TISSUE at 17:05

## 2021-01-01 RX ADMIN — PROPOFOL 10 MCG/KG/MIN: 10 INJECTION, EMULSION INTRAVENOUS at 19:15

## 2021-01-01 RX ADMIN — INSULIN LISPRO 15 UNITS: 100 INJECTION, SOLUTION INTRAVENOUS; SUBCUTANEOUS at 11:54

## 2021-01-01 RX ADMIN — Medication 2000 UNITS: at 09:31

## 2021-01-01 RX ADMIN — NOREPINEPHRINE BITARTRATE 23 MCG/MIN: 1 INJECTION, SOLUTION, CONCENTRATE INTRAVENOUS at 06:45

## 2021-01-01 RX ADMIN — INSULIN LISPRO 20 UNITS: 100 INJECTION, SOLUTION INTRAVENOUS; SUBCUTANEOUS at 10:36

## 2021-01-01 RX ADMIN — PRAVASTATIN SODIUM 20 MG: 20 TABLET ORAL at 08:28

## 2021-01-01 RX ADMIN — AMLODIPINE BESYLATE 5 MG: 5 TABLET ORAL at 10:39

## 2021-01-01 RX ADMIN — SODIUM ZIRCONIUM CYCLOSILICATE 5 G: 5 POWDER, FOR SUSPENSION ORAL at 09:10

## 2021-01-01 RX ADMIN — HYDROCHLOROTHIAZIDE 25 MG: 25 TABLET ORAL at 09:31

## 2021-01-01 RX ADMIN — LEVOTHYROXINE SODIUM 150 MCG: 75 TABLET ORAL at 05:35

## 2021-01-01 RX ADMIN — Medication 175 MCG/HR: at 16:38

## 2021-01-01 RX ADMIN — REMDESIVIR 100 MG: 100 INJECTION, POWDER, LYOPHILIZED, FOR SOLUTION INTRAVENOUS at 15:44

## 2021-01-01 RX ADMIN — HYDROCHLOROTHIAZIDE 25 MG: 25 TABLET ORAL at 10:04

## 2021-01-01 RX ADMIN — REMDESIVIR 100 MG: 100 INJECTION, POWDER, LYOPHILIZED, FOR SOLUTION INTRAVENOUS at 14:40

## 2021-01-01 RX ADMIN — ZINC SULFATE 220 MG (50 MG) CAPSULE 50 MG: CAPSULE at 09:54

## 2021-01-01 RX ADMIN — Medication 175 MCG/HR: at 06:04

## 2021-01-01 RX ADMIN — INSULIN LISPRO 15 UNITS: 100 INJECTION, SOLUTION INTRAVENOUS; SUBCUTANEOUS at 16:47

## 2021-01-01 RX ADMIN — CHLORHEXIDINE GLUCONATE 0.12% ORAL RINSE 15 ML: 1.2 LIQUID ORAL at 08:37

## 2021-01-01 RX ADMIN — SODIUM CHLORIDE: 9 INJECTION, SOLUTION INTRAVENOUS at 11:51

## 2021-01-01 RX ADMIN — INSULIN LISPRO 5 UNITS: 100 INJECTION, SOLUTION INTRAVENOUS; SUBCUTANEOUS at 17:55

## 2021-01-01 RX ADMIN — GUAIFENESIN 200 MG: 200 SOLUTION ORAL at 08:08

## 2021-01-01 RX ADMIN — ACETAMINOPHEN 650 MG: 325 TABLET ORAL at 17:01

## 2021-01-01 RX ADMIN — ENOXAPARIN SODIUM 40 MG: 40 INJECTION SUBCUTANEOUS at 08:04

## 2021-01-01 RX ADMIN — LEVOTHYROXINE SODIUM 150 MCG: 75 TABLET ORAL at 05:33

## 2021-01-01 RX ADMIN — HYDROCHLOROTHIAZIDE 25 MG: 25 TABLET ORAL at 09:58

## 2021-01-01 RX ADMIN — ALBUTEROL SULFATE 2 PUFF: 108 AEROSOL, METERED RESPIRATORY (INHALATION) at 21:34

## 2021-01-01 RX ADMIN — DEXAMETHASONE SODIUM PHOSPHATE 6 MG: 4 INJECTION, SOLUTION INTRA-ARTICULAR; INTRALESIONAL; INTRAMUSCULAR; INTRAVENOUS; SOFT TISSUE at 09:31

## 2021-01-01 RX ADMIN — MINERAL OIL AND PETROLATUM: 150; 830 OINTMENT OPHTHALMIC at 19:13

## 2021-01-01 RX ADMIN — PROPOFOL 15 MCG/KG/MIN: 10 INJECTION, EMULSION INTRAVENOUS at 04:17

## 2021-01-01 RX ADMIN — IVERMECTIN 15 MG: 3 TABLET ORAL at 09:22

## 2021-01-01 RX ADMIN — ENOXAPARIN SODIUM 40 MG: 40 INJECTION SUBCUTANEOUS at 19:45

## 2021-01-01 RX ADMIN — MINERAL OIL AND PETROLATUM: 150; 830 OINTMENT OPHTHALMIC at 12:47

## 2021-01-01 RX ADMIN — GUAIFENESIN AND DEXTROMETHORPHAN 5 ML: 100; 10 SYRUP ORAL at 08:54

## 2021-01-01 RX ADMIN — GUAIFENESIN AND DEXTROMETHORPHAN 5 ML: 100; 10 SYRUP ORAL at 06:37

## 2021-01-01 RX ADMIN — PRAVASTATIN SODIUM 20 MG: 20 TABLET ORAL at 08:05

## 2021-01-01 RX ADMIN — ENOXAPARIN SODIUM 50 MG: 60 INJECTION SUBCUTANEOUS at 22:17

## 2021-01-01 RX ADMIN — DEXAMETHASONE SODIUM PHOSPHATE 6 MG: 4 INJECTION, SOLUTION INTRA-ARTICULAR; INTRALESIONAL; INTRAMUSCULAR; INTRAVENOUS; SOFT TISSUE at 09:52

## 2021-01-01 RX ADMIN — NYSTATIN 500000 UNITS: 500000 SUSPENSION ORAL at 13:00

## 2021-01-01 RX ADMIN — DEXAMETHASONE SODIUM PHOSPHATE 6 MG: 4 INJECTION, SOLUTION INTRA-ARTICULAR; INTRALESIONAL; INTRAMUSCULAR; INTRAVENOUS; SOFT TISSUE at 05:40

## 2021-01-01 RX ADMIN — Medication 500 MG: at 08:28

## 2021-01-01 RX ADMIN — PANTOPRAZOLE SODIUM 40 MG: 40 TABLET, DELAYED RELEASE ORAL at 09:54

## 2021-01-01 RX ADMIN — SODIUM CHLORIDE, PRESERVATIVE FREE 10 ML: 5 INJECTION INTRAVENOUS at 08:38

## 2021-01-01 RX ADMIN — LEVOFLOXACIN 750 MG: 500 TABLET, FILM COATED ORAL at 09:54

## 2021-01-01 RX ADMIN — Medication 10 ML: at 10:44

## 2021-01-01 RX ADMIN — INSULIN LISPRO 7 UNITS: 100 INJECTION, SOLUTION INTRAVENOUS; SUBCUTANEOUS at 12:08

## 2021-01-01 RX ADMIN — GUAIFENESIN AND DEXTROMETHORPHAN 5 ML: 100; 10 SYRUP ORAL at 10:11

## 2021-01-01 RX ADMIN — Medication 500 MG: at 22:16

## 2021-01-01 RX ADMIN — Medication 10 ML: at 08:16

## 2021-01-01 RX ADMIN — INSULIN LISPRO 15 UNITS: 100 INJECTION, SOLUTION INTRAVENOUS; SUBCUTANEOUS at 22:06

## 2021-01-01 RX ADMIN — INSULIN GLARGINE 40 UNITS: 100 INJECTION, SOLUTION SUBCUTANEOUS at 21:43

## 2021-01-01 RX ADMIN — Medication 2000 UNITS: at 08:55

## 2021-01-01 RX ADMIN — DEXAMETHASONE SODIUM PHOSPHATE 6 MG: 4 INJECTION, SOLUTION INTRA-ARTICULAR; INTRALESIONAL; INTRAMUSCULAR; INTRAVENOUS; SOFT TISSUE at 21:48

## 2021-01-01 RX ADMIN — LOSARTAN POTASSIUM 100 MG: 50 TABLET, FILM COATED ORAL at 08:04

## 2021-01-01 RX ADMIN — AMLODIPINE BESYLATE 5 MG: 5 TABLET ORAL at 11:29

## 2021-01-01 RX ADMIN — GUAIFENESIN AND DEXTROMETHORPHAN 5 ML: 100; 10 SYRUP ORAL at 17:13

## 2021-01-01 RX ADMIN — INSULIN LISPRO 12 UNITS: 100 INJECTION, SOLUTION INTRAVENOUS; SUBCUTANEOUS at 06:12

## 2021-01-01 RX ADMIN — SODIUM ZIRCONIUM CYCLOSILICATE 5 G: 5 POWDER, FOR SUSPENSION ORAL at 14:19

## 2021-01-01 RX ADMIN — Medication 10 ML: at 20:41

## 2021-01-01 RX ADMIN — BARICITINIB 2 MG: 2 TABLET, FILM COATED ORAL at 15:18

## 2021-01-01 RX ADMIN — Medication 175 MCG/HR: at 18:28

## 2021-01-01 RX ADMIN — Medication 5 ML: at 08:36

## 2021-01-01 RX ADMIN — VASOPRESSIN 0.04 UNITS/MIN: 20 INJECTION INTRAVENOUS at 07:22

## 2021-01-01 RX ADMIN — PROPOFOL 10 MCG/KG/MIN: 10 INJECTION, EMULSION INTRAVENOUS at 06:06

## 2021-01-01 RX ADMIN — Medication 175 MCG/HR: at 09:01

## 2021-01-01 RX ADMIN — Medication 500 MG: at 10:30

## 2021-01-01 RX ADMIN — Medication 2000 UNITS: at 09:58

## 2021-01-01 RX ADMIN — PANTOPRAZOLE SODIUM 40 MG: 40 INJECTION, POWDER, FOR SOLUTION INTRAVENOUS at 08:05

## 2021-01-01 RX ADMIN — Medication 100 MCG/HR: at 08:00

## 2021-01-01 RX ADMIN — ALBUTEROL SULFATE 2 PUFF: 108 AEROSOL, METERED RESPIRATORY (INHALATION) at 11:30

## 2021-01-01 RX ADMIN — GUAIFENESIN AND DEXTROMETHORPHAN 5 ML: 100; 10 SYRUP ORAL at 05:30

## 2021-01-01 RX ADMIN — INSULIN LISPRO 7 UNITS: 100 INJECTION, SOLUTION INTRAVENOUS; SUBCUTANEOUS at 12:58

## 2021-01-01 RX ADMIN — GUAIFENESIN AND DEXTROMETHORPHAN 5 ML: 100; 10 SYRUP ORAL at 15:54

## 2021-01-01 RX ADMIN — CISATRACURIUM BESYLATE 2 MCG/KG/MIN: 200 INJECTION, SOLUTION INTRAVENOUS at 04:18

## 2021-01-01 RX ADMIN — Medication 2000 UNITS: at 10:40

## 2021-01-01 RX ADMIN — DEXAMETHASONE SODIUM PHOSPHATE 6 MG: 4 INJECTION, SOLUTION INTRA-ARTICULAR; INTRALESIONAL; INTRAMUSCULAR; INTRAVENOUS; SOFT TISSUE at 10:01

## 2021-01-01 RX ADMIN — LEVOTHYROXINE SODIUM 150 MCG: 75 TABLET ORAL at 05:30

## 2021-01-01 RX ADMIN — MINERAL OIL AND PETROLATUM: 150; 830 OINTMENT OPHTHALMIC at 11:37

## 2021-01-01 RX ADMIN — NYSTATIN 500000 UNITS: 500000 SUSPENSION ORAL at 20:40

## 2021-01-01 RX ADMIN — INSULIN LISPRO 10 UNITS: 100 INJECTION, SOLUTION INTRAVENOUS; SUBCUTANEOUS at 03:22

## 2021-01-01 RX ADMIN — PRAVASTATIN SODIUM 20 MG: 20 TABLET ORAL at 10:09

## 2021-01-01 RX ADMIN — LEVOTHYROXINE SODIUM 150 MCG: 75 TABLET ORAL at 06:18

## 2021-01-01 RX ADMIN — DEXAMETHASONE SODIUM PHOSPHATE 6 MG: 4 INJECTION, SOLUTION INTRA-ARTICULAR; INTRALESIONAL; INTRAMUSCULAR; INTRAVENOUS; SOFT TISSUE at 06:55

## 2021-01-01 RX ADMIN — INSULIN GLARGINE 25 UNITS: 100 INJECTION, SOLUTION SUBCUTANEOUS at 21:33

## 2021-01-01 RX ADMIN — CALCIUM GLUCONATE 1000 MG: 98 INJECTION, SOLUTION INTRAVENOUS at 11:46

## 2021-01-01 RX ADMIN — Medication 10 ML: at 21:04

## 2021-01-01 RX ADMIN — ENOXAPARIN SODIUM 40 MG: 40 INJECTION SUBCUTANEOUS at 09:31

## 2021-01-01 ASSESSMENT — PULMONARY FUNCTION TESTS
PIF_VALUE: 31
PIF_VALUE: 44
PIF_VALUE: 39
PIF_VALUE: 33
PIF_VALUE: 40
PIF_VALUE: 33
PIF_VALUE: 41
PIF_VALUE: 32
PIF_VALUE: 33
PIF_VALUE: 31
PIF_VALUE: 32
PIF_VALUE: 39
PIF_VALUE: 34
PIF_VALUE: 35
PIF_VALUE: 41
PIF_VALUE: 31
PIF_VALUE: 42
PIF_VALUE: 34
PIF_VALUE: 36
PIF_VALUE: 31
PIF_VALUE: 40
PIF_VALUE: 34
PIF_VALUE: 31
PIF_VALUE: 33
PIF_VALUE: 34
PIF_VALUE: 36
PIF_VALUE: 34
PIF_VALUE: 32
PIF_VALUE: 38
PIF_VALUE: 36
PIF_VALUE: 35
PIF_VALUE: 37
PIF_VALUE: 28
PIF_VALUE: 32
PIF_VALUE: 33
PIF_VALUE: 32
PIF_VALUE: 31
PIF_VALUE: 38
PIF_VALUE: 32
PIF_VALUE: 43
PIF_VALUE: 32
PIF_VALUE: 32
PIF_VALUE: 34
PIF_VALUE: 34
PIF_VALUE: 43
PIF_VALUE: 40
PIF_VALUE: 34
PIF_VALUE: 32
PIF_VALUE: 35
PIF_VALUE: 41
PIF_VALUE: 34
PIF_VALUE: 32
PIF_VALUE: 33
PIF_VALUE: 34
PIF_VALUE: 42
PIF_VALUE: 39
PIF_VALUE: 31
PIF_VALUE: 44
PIF_VALUE: 31
PIF_VALUE: 35
PIF_VALUE: 31
PIF_VALUE: 40
PIF_VALUE: 38
PIF_VALUE: 33
PIF_VALUE: 32
PIF_VALUE: 37
PIF_VALUE: 31
PIF_VALUE: 32
PIF_VALUE: 33
PIF_VALUE: 31
PIF_VALUE: 33
PIF_VALUE: 29
PIF_VALUE: 42
PIF_VALUE: 33
PIF_VALUE: 32
PIF_VALUE: 31
PIF_VALUE: 33
PIF_VALUE: 29
PIF_VALUE: 33
PIF_VALUE: 30
PIF_VALUE: 32
PIF_VALUE: 34
PIF_VALUE: 40
PIF_VALUE: 36
PIF_VALUE: 33
PIF_VALUE: 35
PIF_VALUE: 43
PIF_VALUE: 30
PIF_VALUE: 32
PIF_VALUE: 31
PIF_VALUE: 30
PIF_VALUE: 31
PIF_VALUE: 38
PIF_VALUE: 32
PIF_VALUE: 34
PIF_VALUE: 42
PIF_VALUE: 34
PIF_VALUE: 42
PIF_VALUE: 31
PIF_VALUE: 38
PIF_VALUE: 31
PIF_VALUE: 32
PIF_VALUE: 32
PIF_VALUE: 33
PIF_VALUE: 35
PIF_VALUE: 30
PIF_VALUE: 39
PIF_VALUE: 34
PIF_VALUE: 31
PIF_VALUE: 33
PIF_VALUE: 33
PIF_VALUE: 34
PIF_VALUE: 36
PIF_VALUE: 32
PIF_VALUE: 41
PIF_VALUE: 33
PIF_VALUE: 33
PIF_VALUE: 40
PIF_VALUE: 33
PIF_VALUE: 38
PIF_VALUE: 41
PIF_VALUE: 33
PIF_VALUE: 33
PIF_VALUE: 30
PIF_VALUE: 43
PIF_VALUE: 31
PIF_VALUE: 41
PIF_VALUE: 39
PIF_VALUE: 34
PIF_VALUE: 31
PIF_VALUE: 31
PIF_VALUE: 35
PIF_VALUE: 31
PIF_VALUE: 32
PIF_VALUE: 34
PIF_VALUE: 33
PIF_VALUE: 37
PIF_VALUE: 33
PIF_VALUE: 39
PIF_VALUE: 32
PIF_VALUE: 41
PIF_VALUE: 39
PIF_VALUE: 36
PIF_VALUE: 35
PIF_VALUE: 40
PIF_VALUE: 44
PIF_VALUE: 37
PIF_VALUE: 37
PIF_VALUE: 32
PIF_VALUE: 31
PIF_VALUE: 44
PIF_VALUE: 32
PIF_VALUE: 38
PIF_VALUE: 32
PIF_VALUE: 29
PIF_VALUE: 33
PIF_VALUE: 38
PIF_VALUE: 34
PIF_VALUE: 36
PIF_VALUE: 32
PIF_VALUE: 31
PIF_VALUE: 31
PIF_VALUE: 33
PIF_VALUE: 35
PIF_VALUE: 31
PIF_VALUE: 30
PIF_VALUE: 33
PIF_VALUE: 32
PIF_VALUE: 33
PIF_VALUE: 31
PIF_VALUE: 32
PIF_VALUE: 37
PIF_VALUE: 39
PIF_VALUE: 33
PIF_VALUE: 35
PIF_VALUE: 43
PIF_VALUE: 32
PIF_VALUE: 31
PIF_VALUE: 33
PIF_VALUE: 34
PIF_VALUE: 39
PIF_VALUE: 34
PIF_VALUE: 38
PIF_VALUE: 31
PIF_VALUE: 40
PIF_VALUE: 32
PIF_VALUE: 38
PIF_VALUE: 42
PIF_VALUE: 32
PIF_VALUE: 31
PIF_VALUE: 33
PIF_VALUE: 43
PIF_VALUE: 34
PIF_VALUE: 34
PIF_VALUE: 41
PIF_VALUE: 33
PIF_VALUE: 42
PIF_VALUE: 33
PIF_VALUE: 33
PIF_VALUE: 40
PIF_VALUE: 40
PIF_VALUE: 37
PIF_VALUE: 38
PIF_VALUE: 32
PIF_VALUE: 31
PIF_VALUE: 37
PIF_VALUE: 37
PIF_VALUE: 33
PIF_VALUE: 34
PIF_VALUE: 37
PIF_VALUE: 39
PIF_VALUE: 33
PIF_VALUE: 30
PIF_VALUE: 34
PIF_VALUE: 40
PIF_VALUE: 35
PIF_VALUE: 33
PIF_VALUE: 32
PIF_VALUE: 33
PIF_VALUE: 36
PIF_VALUE: 31
PIF_VALUE: 32
PIF_VALUE: 40
PIF_VALUE: 31
PIF_VALUE: 32
PIF_VALUE: 32
PIF_VALUE: 42
PIF_VALUE: 32
PIF_VALUE: 31
PIF_VALUE: 40
PIF_VALUE: 32
PIF_VALUE: 31
PIF_VALUE: 32
PIF_VALUE: 42
PIF_VALUE: 37
PIF_VALUE: 32
PIF_VALUE: 39
PIF_VALUE: 34
PIF_VALUE: 31
PIF_VALUE: 40
PIF_VALUE: 32
PIF_VALUE: 33
PIF_VALUE: 29
PIF_VALUE: 32
PIF_VALUE: 30
PIF_VALUE: 32
PIF_VALUE: 34
PIF_VALUE: 33
PIF_VALUE: 29
PIF_VALUE: 32
PIF_VALUE: 30
PIF_VALUE: 31
PIF_VALUE: 31
PIF_VALUE: 40
PIF_VALUE: 39
PIF_VALUE: 39
PIF_VALUE: 44
PIF_VALUE: 34
PIF_VALUE: 39
PIF_VALUE: 40
PIF_VALUE: 31
PIF_VALUE: 33
PIF_VALUE: 39
PIF_VALUE: 35
PIF_VALUE: 37
PIF_VALUE: 36

## 2021-01-01 ASSESSMENT — PAIN SCALES - GENERAL
PAINLEVEL_OUTOF10: 0
PAINLEVEL_OUTOF10: 5
PAINLEVEL_OUTOF10: 0
PAINLEVEL_OUTOF10: 4
PAINLEVEL_OUTOF10: 0
PAINLEVEL_OUTOF10: 4
PAINLEVEL_OUTOF10: 0
PAINLEVEL_OUTOF10: 3
PAINLEVEL_OUTOF10: 0
PAINLEVEL_OUTOF10: 1
PAINLEVEL_OUTOF10: 0
PAINLEVEL_OUTOF10: 4
PAINLEVEL_OUTOF10: 0
PAINLEVEL_OUTOF10: 0
PAINLEVEL_OUTOF10: 3
PAINLEVEL_OUTOF10: 0
PAINLEVEL_OUTOF10: 3
PAINLEVEL_OUTOF10: 3
PAINLEVEL_OUTOF10: 0
PAINLEVEL_OUTOF10: 3
PAINLEVEL_OUTOF10: 0
PAINLEVEL_OUTOF10: 5
PAINLEVEL_OUTOF10: 0
PAINLEVEL_OUTOF10: 4
PAINLEVEL_OUTOF10: 0
PAINLEVEL_OUTOF10: 4
PAINLEVEL_OUTOF10: 0
PAINLEVEL_OUTOF10: 0

## 2021-01-01 ASSESSMENT — PAIN DESCRIPTION - FREQUENCY: FREQUENCY: INTERMITTENT

## 2021-01-01 ASSESSMENT — PAIN DESCRIPTION - PAIN TYPE
TYPE: ACUTE PAIN
TYPE: CHRONIC PAIN

## 2021-01-01 ASSESSMENT — PAIN DESCRIPTION - DESCRIPTORS
DESCRIPTORS: ACHING
DESCRIPTORS: ACHING;DISCOMFORT;DULL
DESCRIPTORS: ACHING;DISCOMFORT
DESCRIPTORS: DISCOMFORT;SORE

## 2021-01-01 ASSESSMENT — PAIN DESCRIPTION - LOCATION
LOCATION: BACK
LOCATION: BACK;NECK
LOCATION: GENERALIZED
LOCATION: BACK

## 2021-01-01 ASSESSMENT — ENCOUNTER SYMPTOMS
COUGH: 1
VOMITING: 0
EYE REDNESS: 0
ABDOMINAL PAIN: 0
SHORTNESS OF BREATH: 1
NAUSEA: 0

## 2021-01-01 ASSESSMENT — PAIN DESCRIPTION - PROGRESSION: CLINICAL_PROGRESSION: NOT CHANGED

## 2021-01-01 ASSESSMENT — PAIN DESCRIPTION - ORIENTATION
ORIENTATION: MID
ORIENTATION: LOWER

## 2021-01-01 ASSESSMENT — PAIN DESCRIPTION - ONSET: ONSET: ON-GOING

## 2021-01-01 ASSESSMENT — PAIN - FUNCTIONAL ASSESSMENT: PAIN_FUNCTIONAL_ASSESSMENT: ACTIVITIES ARE NOT PREVENTED

## 2021-08-30 PROBLEM — J12.82 PNEUMONIA DUE TO COVID-19 VIRUS: Status: ACTIVE | Noted: 2021-01-01

## 2021-08-30 PROBLEM — U07.1 PNEUMONIA DUE TO COVID-19 VIRUS: Status: ACTIVE | Noted: 2021-01-01

## 2021-08-30 NOTE — ED NOTES
Oxygen applied per NC at 3L per nightturn RN. Pulse ox increased to 97%. No s/s distress.       Yue Reno RN  08/30/21 3260

## 2021-08-30 NOTE — ED PROVIDER NOTES
Chief complaint: Cough and fatigue      HPI:  8/30/21, Time: 7:30 AM EDT    HPI               Montserrat Rodriguez is a 66 y.o. female presenting to the ED for cough and fatigue. History is obtained from the patient as well as the patient's medical record. The patient is presenting to the emergency department the chief complaint of cough and fatigue. Patient reports that she began approximately 1 week ago with nasal congestion rhinorrhea. She states that she felt that mucus was dripping down her throat and she has some postnasal drip. This is been constant since onset. Nothing makes it better. Nothing makes it worse. The patient has just finished a Z-Ramírez. She states that she is having fatigue. This is worse with activity and exertion. There are no alleviating factors. The patient states that she has been around a sick friend who was diagnosed with \"bronchitis. \"The patient has not been vaccinated for COVID-19. Patient denies any chest pain or shortness of breath. She does complain of diffuse body aches which she states she feels secondary to coughing. The patient does have a history of hypertension, hyperlipidemia, diabetes. She is not a smoker. She has never required oxygen in the past.  The patient is noted to be hypoxic upon arrival. The patient has no history of DVT or PE, is not on any hormone replacement therapy, denies any active malignancy, recent surgeries or long periods of travel sitting in a car or plane. ROS:   Review of Systems   Constitutional: Positive for fatigue. Negative for chills. HENT: Negative for congestion. Eyes: Negative for redness. Respiratory: Positive for cough and shortness of breath. Cardiovascular: Negative for chest pain. Gastrointestinal: Negative for abdominal pain, nausea and vomiting. Genitourinary: Negative for dysuria. Musculoskeletal: Negative for arthralgias. Skin: Negative for rash. Neurological: Negative for light-headedness. Psychiatric/Behavioral: Negative for confusion. All other systems reviewed and are negative.      --------------------------------------------- PAST HISTORY ---------------------------------------------  Past Medical History:  has a past medical history of Cancer ([facility] Diabetes mellitus ([facility] Hypertension, Hypothyroidism, Thyroid disease, and Urinary incontinence. Past Surgical History:  has a past surgical history that includes Tubal ligation and Thyroidectomy. Social History:  reports that she has never smoked. She has never used smokeless tobacco. She reports that she does not drink alcohol and does not use drugs. Family History: family history includes Diabetes in her father, maternal grandmother, and mother; Down Syndrome in her daughter; Hypertension in her father, maternal grandmother, and mother; Stroke in her mother; Thyroid Disease in her daughter. The patients home medications have been reviewed. Allergies: Patient has no known allergies. ---------------------------------------------------PHYSICAL EXAM--------------------------------------    Constitutional/General: Alert and oriented x3, well appearing, non toxic in NAD  Head: Normocephalic and atraumatic  Mouth: Oropharynx clear, handling secretions, no trismus  Neck: Supple, full ROM,  Pulmonary: Diffusely coarse breath sounds, no wheezing, rales or rhonchi  Cardiovascular:  Regular rate. Regular rhythm. No murmurs  Chest: no chest wall tenderness  Abdomen: Soft. Non tender. Non distended. No rebound, guarding, or rigidity. No pulsatile masses appreciated. Musculoskeletal: Moves all extremities x 4. Warm and well perfused, no clubbing, cyanosis, or edema. Capillary refill <3 seconds  Skin: warm and dry. No rashes.    Neurologic: GCS 15, no gross focal neurologic deficits  Psych: Normal Affect    -------------------------------------------------- RESULTS -------------------------------------------------  I have personally reviewed all laboratory and imaging results for this patient. Results are listed below.      LABS:  Results for orders placed or performed during the hospital encounter of 08/30/21   COVID-19, Rapid    Specimen: Nasopharyngeal Swab   Result Value Ref Range    SARS-CoV-2, NAAT DETECTED (A) Not Detected   CBC Auto Differential   Result Value Ref Range    WBC 4.1 (L) 4.5 - 11.5 E9/L    RBC 3.99 3.50 - 5.50 E12/L    Hemoglobin 12.6 11.5 - 15.5 g/dL    Hematocrit 37.1 34.0 - 48.0 %    MCV 93.0 80.0 - 99.9 fL    MCH 31.6 26.0 - 35.0 pg    MCHC 34.0 32.0 - 34.5 %    RDW 12.5 11.5 - 15.0 fL    Platelets 764 194 - 772 E9/L    MPV 10.5 7.0 - 12.0 fL    Neutrophils % 76.2 43.0 - 80.0 %    Immature Granulocytes % 0.5 0.0 - 5.0 %    Lymphocytes % 17.2 (L) 20.0 - 42.0 %    Monocytes % 6.1 2.0 - 12.0 %    Eosinophils % 0.0 0.0 - 6.0 %    Basophils % 0.0 0.0 - 2.0 %    Neutrophils Absolute 3.14 1.80 - 7.30 E9/L    Immature Granulocytes # 0.02 E9/L    Lymphocytes Absolute 0.71 (L) 1.50 - 4.00 E9/L    Monocytes Absolute 0.25 0.10 - 0.95 E9/L    Eosinophils Absolute 0.00 (L) 0.05 - 0.50 E9/L    Basophils Absolute 0.00 0.00 - 0.20 E9/L   Comprehensive Metabolic Panel w/ Reflex to MG   Result Value Ref Range    Sodium 133 132 - 146 mmol/L    Potassium reflex Magnesium 4.0 3.5 - 5.0 mmol/L    Chloride 97 (L) 98 - 107 mmol/L    CO2 27 22 - 29 mmol/L    Anion Gap 9 7 - 16 mmol/L    Glucose 299 (H) 74 - 99 mg/dL    BUN 18 6 - 23 mg/dL    CREATININE 1.2 (H) 0.5 - 1.0 mg/dL    GFR Non-African American 44 >=60 mL/min/1.73    GFR African American 53     Calcium 9.5 8.6 - 10.2 mg/dL    Total Protein 6.6 6.4 - 8.3 g/dL    Albumin 3.2 (L) 3.5 - 5.2 g/dL    Total Bilirubin 0.2 0.0 - 1.2 mg/dL    Alkaline Phosphatase 75 35 - 104 U/L    ALT 34 (H) 0 - 32 U/L    AST 43 (H) 0 - 31 U/L   Troponin   Result Value Ref Range    Troponin, High Sensitivity <6 0 - 9 ng/L   D-Dimer, Quantitative   Result Value Ref Range    D-Dimer, Quant 218 ng/mL DDU   EKG 12 Lead   Result Value Ref Range    Ventricular Rate 90 BPM    Atrial Rate 90 BPM    P-R Interval 150 ms    QRS Duration 90 ms    Q-T Interval 378 ms    QTc Calculation (Bazett) 462 ms    P Axis 9 degrees    R Axis -39 degrees    T Axis 11 degrees       RADIOLOGY:  Interpreted by Radiologist.  XR CHEST PORTABLE   Final Result   Patchy multifocal bilateral airspace disease. EKG:  This EKG is signed and interpreted by me. Normal sinus rhythm, rate of 90, no ST segment ovation or depression, OR interval 150 MS, QRS 90 MS,  MS  Interpreted by me      ------------------------- NURSING NOTES AND VITALS REVIEWED ---------------------------   The nursing notes within the ED encounter and vital signs as below have been reviewed by myself. /71   Pulse 89   Temp 99 °F (37.2 °C) (Oral)   Resp 16   Wt 213 lb (96.6 kg)   SpO2 95%   BMI 32.39 kg/m²   Oxygen Saturation Interpretation: Normal    The patients available past medical records and past encounters were reviewed. ------------------------------ ED COURSE/MEDICAL DECISION MAKING----------------------  Medications   0.9 % sodium chloride bolus (0 mLs IntraVENous Stopped 8/30/21 0953)   ketorolac (TORADOL) injection 15 mg (15 mg IntraVENous Given 8/30/21 0809)   guaiFENesin (ROBITUSSIN) 100 MG/5ML oral solution 200 mg (200 mg Oral Given 8/30/21 0808)   dexamethasone (PF) (DECADRON) injection 6 mg (6 mg IntraVENous Given 8/30/21 9831)             Medical Decision Making:   I, Dr. Belle Toth am the primary physician of record. Jenna Gannon is a 66 y.o. female who presents to the ED for shortness of breath and fatigue. The patient did have labs and imaging which were reviewed. Patient found to be hypoxic secondary to Covid pneumonia. The patient was given Decadron. Patient will be admitted for further treatment and evaluation.       Re-Evaluations/Consultations:             ED Course as of Aug 30 1006   Mon Aug 30, 2021   8577 The bed no acute distress. Results of today discussed. The patient will be admitted for further treatment and evaluation. [MT]   [phone] with Dr. Vinayak Chinchilla he states that this time he does not admit Covid patients. He will defer to hospitalist.    [MT]   [phone] with Dr. Maite England she will accept the patient for admission    [MT]      ED Course User Index  [MT] Elvia Babin DO               This patient's ED course included: History, physical examination, reevaluation prior to disposition, labs, imaging, telemetry monitoring, EKG, IV medications    This patient has remained hemodynamically stable during their ED course. Counseling: The emergency provider has spoken with the patient and discussed todays results, in addition to providing specific details for the plan of care and counseling regarding the diagnosis and prognosis. Questions are answered at this time and they are agreeable with the plan.       --------------------------------- IMPRESSION AND DISPOSITION ---------------------------------    IMPRESSION  1. COVID-19    2. Acute respiratory failure with hypoxia (HCC)        DISPOSITION  Disposition: Admit to med/surg floor  Patient condition is stable        NOTE: This report was transcribed using voice recognition software.  Every effort was made to ensure accuracy; however, inadvertent computerized transcription errors may be present         Elvia Babin DO  08/30/21 1006

## 2021-08-30 NOTE — LETTER
Episode is a grouping of medical conditions or diagnoses that are included in the [address].

## 2021-08-30 NOTE — H&P
[facility] History and Physical      CHIEF COMPLAINT:  Cough/fatigue    History of Present Illness:     Patient is a 65 yo female patient who follows with PCP Dr. 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 Patient presented to the ER at [facility] with concern for cough, fatigue. Associated body aches. Reporting she has not been feeling well for 1 week. Symptoms moderate, ongoing and progressive. Any exertion worsens. Reporting she saw PCP and was given a script for a z-pack. Which she took with some improvement. Reporting mucous very thick and unable to cough up. Reporting she was around a friend who was ill and had bronchitis- however her friend tested negative for COVID. Had loose stools one week ago. Pt is not vaccinated. She had been taking tylenol, motrin and robitussin for symptoms relief. Pt awake and alert resting in bed in no acute distress. Denies fevers, chills. Nausea, vomiting, dysuria, hematuria, hematochezia. Denies loss of taste or smell. Reporting decreased appetite. Informant(s) for H&P:patient/ chart review    REVIEW OF SYSTEMS:  A comprehensive review of systems was negative except for: what is in the HPI      PMH:  Past Medical History:   Diagnosis Date    [facility] ([facility]     Diabetes mellitus ([facility]     Hypertension     Hypothyroidism     Thyroid disease     Urinary incontinence        Surgical History:  Past Surgical History:   Procedure Laterality Date    THYROIDECTOMY      TUBAL LIGATION         Medications Prior to Admission:    Prior to Admission medications    Medication Sig Start Date End Date Taking?  Authorizing Provider   Dulaglutide (TRULICITY) 1.5 KH/4.3DA SOPN Inject 1.5 mg into the skin once a week   Yes Historical Provider, MD   amLODIPine (NORVASC) 5 MG tablet Take 5 mg by mouth daily   Yes Historical Provider, MD   pravastatin (PRAVACHOL) 20 MG tablet Take 20 mg by mouth daily   Yes Historical Provider, MD   metFORMIN (GLUCOPHAGE) 500 MG tablet Take 1 tablet by mouth 2 times daily (with meals). 2 tablets twice daily 6/18/14  Yes Marko Abdalla MD   levothyroxine (LEVOXYL) 125 MCG tablet Takes 1 tablet Monday through Saturday. Take 1 and 1/2 tablet on Sunday. Patient taking differently: 150 mcg Takes 1 tablet Monday through Saturday. Take 1 and 1/2 tablet on Sunday. 6/18/14  Yes Marko Abdalla MD   glimepiride (AMARYL) 2 MG tablet Take 1 tablet by mouth Daily with supper. 2 mg po daily 6/18/14  Yes Marko Abdalla MD   cyanocobalamin ([employer]S VITAMIN B12) 1000 MCG tablet Take 1 tablet by mouth daily. 11/19/13  Yes Marko Abdalla MD   losartan-hydrochlorothiazide (HYZAAR) 100-25 MG per tablet Take 1 tablet by mouth daily. Yes Historical Provider, MD   omeprazole (PRILOSEC) 20 MG capsule Take 20 mg by mouth every other day. Yes Historical Provider, MD   vitamin D (ERGOCALCIFEROL) 05927 UNITS CAPS capsule Take 1 capsule by mouth every 30 days. 6/18/14   Marko Abdalla MD       Allergies:    Patient has no known allergies. Social History:    reports that she has never smoked. She has never used smokeless tobacco. She reports that she does not drink alcohol and does not use drugs. Non smoker  Denies illicits      Family History:   family history includes Diabetes in her father, maternal grandmother, and mother; Down Syndrome in her daughter; Hypertension in her father, maternal grandmother, and mother; Stroke in her mother; Thyroid Disease in her daughter.        PHYSICAL EXAM:  Vitals:  /69   Pulse 76   Temp 97.5 °F (36.4 °C) (Oral)   Resp 18   SpO2 96%   General Appearance: alert and oriented to person, place and time and in no acute distress  Skin: warm and dry  Head: normocephalic and atraumatic  Neck: neck supple and non tender without mass   Pulmonary/Chest: clear to auscultation bilaterally  Cardiovascular: normal rate, normal S1 and S2 and no carotid bruits  Abdomen: soft, non-tender, non-distended, normal bowel sounds, no masses or organomegaly  Extremities: no cyanosis, no clubbing and no edema  Neurologic: speech normal         LABS:  Recent Labs     08/30/21  0825      K 4.0   CL 97*   CO2 27   BUN 18   CREATININE 1.2*   GLUCOSE 299*   CALCIUM 9.5       Recent Labs     08/30/21  0825   WBC 4.1*   RBC 3.99   HGB 12.6   HCT 37.1   MCV 93.0   MCH 31.6   MCHC 34.0   RDW 12.5      MPV 10.5       No results for input(s): POCGLU in the last 72 hours. Radiology:   No orders to display           ASSESSMENT:      Active Problems:    Pneumonia due to COVID-19 virus  Resolved Problems:    * No resolved hospital problems. *      PLAN:    1. Acute respiratory failure with hypoxia related to COVID- 19: Pt presented to ER at [facility] with cough, fatigue, and sob. Pulse ox was 87% on RA. Unvaccinated. Around a friend who was ill. She was prescribed a Z michelle outpt with no improvement. 2. Covid- 19 Viral Pneumonia in unvaccinated pt: CXR reviewed. Reporting symptoms started 1 week ago. Cough, fatigue, sob. Check inflammatory markers. Pharmacy consulted for remdesivir. Check CRP. Vitamin Supplementation. Check procal. Pt completed a zpack 1 week prior. IV decadron daily. Monitor blood sugars. Check d dimer. Lovenox per protocol. Encourage SMI and prone as tolerated. 3. DM: check hga1c. PT on trulicity weekly. On metformin/ amaryl- hold for now. Insulin sliding scale. Monitor blood sugars on decadron. 4. H/o thyroidectomy from thyroid cancer: continue levothyroxine    5. 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 HTN: hold bp meds for now and monitor bp      Code Status: full   DVT prophylaxis: lovenox      NOTE: This report was transcribed using voice recognition software. Every effort was made to ensure accuracy; however, inadvertent computerized transcription errors may be present.   Electronically signed by Rima Gilbert KAE on 8/30/2021 at 3:33 PM   HOSPITALIST ATTENDING PHYSICIAN NOTE 8/30/2021 1935PM:    Details of the evaluation - subjective assessment (including medication profile, past medical, family and social history when applicable), examination, review of lab and test data, diagnostic impressions and medical decision making - performed by KAE Badillo, were discussed with me on the date of service and I agree with clinical information herein unless otherwise noted. The patient has been evaluated by me personally earlier today. Pt reports no fevers, chills,n/v. PHYSICAL EXAM:    Vitals:  /79   Pulse 83   Temp 97.5 °F (36.4 °C) (Oral)   Resp 18   SpO2 96%     General:  Appears comfortable. Answers questions appropriately and cooperative with exam  HEENT:  Mucous membranes moist. No erythema, rhinorrhea, or post-nasal drip noted. Neck:  No carotid bruits. Heart:  Rhythm regular at rate of 84  Lungs:  pos rhonchi. Pt with limited inspiration due to coughing  Abdomen:  Positive bowel sounds positive. Soft. Non-tender. No guarding, rebound or rigidity. Breast/Rectal/Genitourinary: not pertinent. Extremities:  Negative for lower extremity edema  Skin:  Warm and dry  Vascular: 2/4 Dorsalis Pedis pulses bilaterally. Neuro:  Cranial nerves 2-12 grossly intact, no focal weakness or change in sensation noted. Extraocular muscles intact. Pupils equal, round, reactive to light. I agree with the assessment and plan of KAE Badillo. Acute respiratory failure with hypoxia([id number]  Pneumonia due to covid 19  Elevated lfts  Dm type 2 uncontrolled  htn  hypothyroidism    Electronically signed by Brooke Rodriguez D.O.   Hospitalist  [facility]ist [facility] at [facility]

## 2021-08-31 NOTE — ACP (ADVANCE CARE PLANNING)
Advance Care Planning     Advance Care Planning Activator (Inpatient)  Conversation Note      Date of ACP Conversation: 8/31/2021     Conversation Conducted with: Maren Dobson      ACP Activator: Tori Romo RN      Health Care Decision Maker:     Current Designated Health Care Decision Maker:     Primary Decision Maker: Gwen [facility] - [phone]    Secondary Decision Maker: Jazmyn Wilkinson - Child - [phone]  Click here to complete Healthcare Decision Makers including section of the Healthcare Decision Maker Relationship (ie \"Primary\")  Care Preferences    Ventilation: \"If you were in your present state of health and suddenly became very ill and were unable to breathe on your own, what would your preference be about the use of a ventilator (breathing machine) if it were available to you? \"      Would the patient desire the use of ventilator (breathing machine)?: yes    \"If your health worsens and it becomes clear that your chance of recovery is unlikely, what would your preference be about the use of a ventilator (breathing machine) if it were available to you? \"     Would the patient desire the use of ventilator (breathing machine)?: yes      Resuscitation  \"CPR works best to restart the heart when there is a sudden event, like a heart attack, in someone who is otherwise healthy. Unfortunately, CPR does not typically restart the heart for people who have serious health conditions or who are very sick. \"    \"In the event your heart stopped as a result of an underlying serious health condition, would you want attempts to be made to restart your heart (answer \"yes\" for attempt to resuscitate) or would you prefer a natural death (answer \"no\" for do not attempt to resuscitate)? \"yes       [x] Yes   [] No   Educated Patient / Taylor Martinez regarding differences between Advance Directives and portable DNR orders.     Length of ACP Conversation in minutes: 15 minutes     Conversation Outcomes:  [x] ACP discussion completed  [] Existing advance directive reviewed with patient; no changes to patient's previously recorded wishes  [] New Advance Directive completed  [] Portable Do Not Rescitate prepared for Provider review and signature  [] POLST/POST/MOLST/MOST prepared for Provider review and signature      Follow-up plan:    [] Schedule follow-up conversation to continue planning  [x] Referred individual to Provider for additional questions/concerns   [] Advised patient/agent/surrogate to review completed ACP document and update if needed with changes in condition, patient preferences or care setting    [] This note routed to one or more involved healthcare providers

## 2021-08-31 NOTE — PROGRESS NOTES
[facility] AT [facility]   Progress Note    Admitting Date and Time: 8/30/2021  2:25 PM  Admit Dx: Pneumonia due to COVID-19 virus [U07.1, J12.82]    Subjective:    Patient was admitted with Pneumonia due to COVID-19 virus [U07.1, J12.82]. Patient denies fever, chills, cp, n/v. Pt with some sob.       sodium chloride flush  5-40 mL IntraVENous 2 times per day    enoxaparin  40 mg SubCUTAneous BID    Vitamin D  2,000 Units Oral Daily    dexamethasone  6 mg IntraVENous Q24H    zinc sulfate  50 mg Oral Daily    ascorbic acid  500 mg Oral BID    pravastatin  20 mg Oral Daily    levothyroxine  150 mcg Oral Daily    pantoprazole  40 mg Oral Every Other Day    remdesivir IVPB  100 mg IntraVENous Q24H    insulin lispro  0-12 Units SubCUTAneous TID WC    insulin lispro  0-6 Units SubCUTAneous Nightly     sodium chloride flush, 5-40 mL, PRN  sodium chloride, 25 mL, PRN  ondansetron, 4 mg, Q8H PRN   Or  ondansetron, 4 mg, Q6H PRN  polyethylene glycol, 17 g, Daily PRN  acetaminophen, 650 mg, Q6H PRN   Or  acetaminophen, 650 mg, Q6H PRN  guaiFENesin-dextromethorphan, 5 mL, Q4H PRN  glucose, 15 g, PRN  dextrose, 12.5 g, PRN  glucagon (rDNA), 1 mg, PRN  dextrose, 100 mL/hr, PRN  sodium chloride, 30 mL, PRN         Objective:    /68   Pulse 90   Temp 97.2 °F (36.2 °C) (Oral)   Resp 24   SpO2 90%   Skin: warm and dry, no rash or erythema  Pulmonary/Chest: clear to auscultation bilaterally- no wheezes, rales or rhonchi, normal air movement, no respiratory distress  Cardiovascular: rhythm reg at rate of 88  Abdomen: soft, non-tender, non-distended, normal bowel sounds, no masses or organomegaly  Extremities: no cyanosis, no clubbing and no edema      Recent Labs     08/30/21  0825 08/31/21  0435    135   K 4.0 4.4   CL 97* 101   CO2 27 24   BUN 18 18   CREATININE 1.2* 0.9   GLUCOSE 299* 260*   CALCIUM 9.5 9.3       Recent Labs     08/30/21  0825 08/31/21  0435   WBC 4.1* 4.0*   RBC 3.99 3.95 HGB 12.6 11.9   HCT 37.1 36.9   MCV 93.0 93.4   MCH 31.6 30.1   MCHC 34.0 32.2   RDW 12.5 12.4    154   MPV 10.5 10.5       CBC with Differential:    Lab Results   Component Value Date    WBC 4.0 08/31/2021    RBC 3.95 08/31/2021    HGB 11.9 08/31/2021    HCT 36.9 08/31/2021     08/31/2021    MCV 93.4 08/31/2021    MCH 30.1 08/31/2021    MCHC 32.2 08/31/2021    RDW 12.4 08/31/2021    LYMPHOPCT 20.6 08/31/2021    MONOPCT 9.2 08/31/2021    BASOPCT 0.0 08/31/2021    MONOSABS 0.37 08/31/2021    LYMPHSABS 0.83 08/31/2021    EOSABS 0.00 08/31/2021    BASOSABS 0.00 08/31/2021     CMP:    Lab Results   Component Value Date     08/31/2021    K 4.4 08/31/2021     08/31/2021    CO2 24 08/31/2021    BUN 18 08/31/2021    CREATININE 0.9 08/31/2021    GFRAA >60 08/31/2021    LABGLOM >60 08/31/2021    GLUCOSE 260 08/31/2021    PROT 6.2 08/31/2021    LABALBU 3.6 08/31/2021    CALCIUM 9.3 08/31/2021    BILITOT <0.2 08/31/2021    ALKPHOS 69 08/31/2021    AST 49 08/31/2021    ALT 40 08/31/2021        Radiology:   No orders to display       Assessment:    Active Problems:    Pneumonia due to COVID-19 virus    Acute respiratory failure with hypoxia (HCC)    Elevated LFTs    Uncontrolled type 2 diabetes mellitus with hyperglycemia ([facility] [address]  Resolved Problems:    * No resolved hospital problems. *      Plan:  1. Acute respiratory failure with hypoxia([id number]%)POA  Wean oxygen as able  2. Pneumonia due to covid 19 continue steroids and remdesivir  3. Elevated lfts continue to monitor  4. Dm type 2 uncontrolled monitor bs and tx with insulin  5. htn hold bp meds and restart if bp rises  6. Hypothyroidism contnue levothyroxine    Will consult pulmonary given elevated crp and increased respirations and consider toci.      Electronically signed by Enrrique Adams DO on 8/31/2021 at 6:42 PM

## 2021-08-31 NOTE — CARE COORDINATION
COVID + 8/30- pt is not vaccinated. Day 2 Remdesivir, on iv Decadron. Phone conversation w/ patient. Explained role of  and plan of care. Lives w/  in a 1 story house- 6 steps to entrance. No Hx DMEs, HHC, or [facility]DO. Independent PTA. Currently requiring O2 3-4 lNC-DME list offered and declined- no preference- will need O2 testing/ order if unable to wean off at discharge- no DME referral made yet. PCP is Dr. Tia Vasques and pharmacy is [facility]. Per pt, plan is to return home on discharge. Will follow Pricilla Falcon, RN case manager    The Plan for Transition of Care is related to the following treatment goals: respiratory conditioning    The Patient and/or patient representative Emilee Greer was provided with a choice of provider and agrees   with the discharge plan. [x] Yes [] No    Freedom of choice list was provided with basic dialogue that supports the patient's individualized plan of care/goals, treatment preferences and shares the quality data associated with the providers.  [x] Yes [] No

## 2021-09-01 NOTE — CARE COORDINATION
COVID + 8/30. On day 3 Remdesivir. Requiring O2 5lNC. No DME preference per previous conversation w/ pt. Referral made to Arin @ [facility] DEM- will need O2 testing/ order if unable to wean off at discharge. Plan remains to return home w/  on discharge.  Will follow Delvin Baca RN case manager

## 2021-09-01 NOTE — PROGRESS NOTES
[facility] Hospitalist   Progress Note    Admitting Date and Time: 8/30/2021  2:25 PM  Admit Dx: Pneumonia due to COVID-19 virus [U07.1, J12.82]    Subjective:    Patient was admitted with Pneumonia due to COVID-19 virus [U07.1, J12.82]. Patient denies fever, chills, cp, n/v.  Pt with some sob but notes some improvement     levoFLOXacin  750 mg Oral Daily    sodium chloride flush  5-40 mL IntraVENous 2 times per day    enoxaparin  40 mg SubCUTAneous BID    Vitamin D  2,000 Units Oral Daily    dexamethasone  6 mg IntraVENous Q24H    zinc sulfate  50 mg Oral Daily    ascorbic acid  500 mg Oral BID    pravastatin  20 mg Oral Daily    levothyroxine  150 mcg Oral Daily    pantoprazole  40 mg Oral Every Other Day    remdesivir IVPB  100 mg IntraVENous Q24H    insulin lispro  0-12 Units SubCUTAneous TID WC    insulin lispro  0-6 Units SubCUTAneous Nightly     sodium chloride flush, 5-40 mL, PRN  sodium chloride, 25 mL, PRN  ondansetron, 4 mg, Q8H PRN   Or  ondansetron, 4 mg, Q6H PRN  polyethylene glycol, 17 g, Daily PRN  acetaminophen, 650 mg, Q6H PRN   Or  acetaminophen, 650 mg, Q6H PRN  guaiFENesin-dextromethorphan, 5 mL, Q4H PRN  glucose, 15 g, PRN  dextrose, 12.5 g, PRN  glucagon (rDNA), 1 mg, PRN  dextrose, 100 mL/hr, PRN  sodium chloride, 30 mL, PRN         Objective:    /74   Pulse 88   Temp 97.4 °F (36.3 °C) (Infrared)   Resp 20   SpO2 91%   Skin: warm and dry, no rash or erythema  Pulmonary/Chest: clear to auscultation bilaterally- no wheezes, rales or rhonchi, normal air movement, no respiratory distress  Cardiovascular: rhythm reg at rate of 84  Abdomen: soft, non-tender, non-distended, normal bowel sounds, no masses or organomegaly  Extremities: no cyanosis, no clubbing and no edema      Recent Labs     08/30/21  0825 08/31/21  0435 09/01/21  0600    135 137   K 4.0 4.4 4.6   CL 97* 101 102   CO2 27 24 26   BUN 18 18 23   CREATININE 1.2* 0.9 1.0   GLUCOSE 299* 260* 291*   CALCIUM 9.5 9.3 9.4       Recent Labs     08/30/21  0825 08/31/21  0435   WBC 4.1* 4.0*   RBC 3.99 3.95   HGB 12.6 11.9   HCT 37.1 36.9   MCV 93.0 93.4   MCH 31.6 30.1   MCHC 34.0 32.2   RDW 12.5 12.4    154   MPV 10.5 10.5       CMP:    Lab Results   Component Value Date     09/01/2021    K 4.6 09/01/2021    K 4.4 08/31/2021     09/01/2021    CO2 26 09/01/2021    BUN 23 09/01/2021    CREATININE 1.0 09/01/2021    GFRAA >60 09/01/2021    LABGLOM 54 09/01/2021    GLUCOSE 291 09/01/2021    PROT 6.4 09/01/2021    LABALBU 3.3 09/01/2021    CALCIUM 9.4 09/01/2021    BILITOT <0.2 09/01/2021    ALKPHOS 70 09/01/2021    AST 39 09/01/2021    ALT 37 09/01/2021        Radiology:   US DUP LOWER EXTREMITIES BILATERAL VENOUS   Final Result   No evidence of DVT in either lower extremity. Assessment:    Active Problems:    Pneumonia due to COVID-19 virus    Acute respiratory failure with hypoxia (HCC)    Elevated LFTs    Uncontrolled type 2 diabetes mellitus with hyperglycemia ([facility] [address]  Resolved Problems:    * No resolved hospital problems. *      Plan:  1. Acute respiratory failure with hypoxia([id number]%)POA  Wean oxygen as able  2. Pneumonia due to covid 19 continue steroids and remdesivir pulmonary added levaquin for elevated procalcitonin  3. Elevated lfts continue to monitor  4. Dm type 2 uncontrolled monitor bs and will add lantus with increase in ssi. 5. htn hold bp meds and restart if bp rises  6.  Hypothyroidism contnue levothyroxine        Electronically signed by Marie Subramanian DO on 9/1/2021 at 7:09 PM

## 2021-09-01 NOTE — PROGRESS NOTES
Pulmonary Consultation    Admit Date: 8/30/2021  Requesting Physician: Con Simmonds, MD    CC:  COVID-19 Pneumonia    HPI:  This is a 66year old female, unvaccinated, with a remote history of sarcoma s/p RUL lobectomy, who presented to the [facility] ED  on 8/30/21 with cough and fatigue for about 1 week with loose stool prior to that. Had been having some sinus symptoms and just completed a Z-michelle. She reported her cough worsening with associated body aches. She was recently around a friend diagnosed with bronchitis who tested negative for COVID. In the ER she was found to be hypoxic, tested positive for COVID-19, EKG normal sinus rhythm without ST changes. Labs and imaging were reviewed and include D-dimer 218, bun/cr 18/1.2, ALT/AST slightly elevated at 34/43. WBC 4.1, h/h 12/37, plt 137, CRP 12-->7, , lactic acid 1.7, afebrile. CXR was positive for patchy b/l infiltrates. She was a previous patient of Dr. Vito Bose but says she has not seen him in > 20 years. Denies any smoking history. She is resting on 4-5L n/c. She is a poor historian so information was obtained from the patient and the chart. She is coughing during the exam but not bringing up sputum. She reports some dyspnea and body aches. C/o RLE edema, \"feels tight and bigger than left\". PMH:    Past Medical History:   Diagnosis Date    Cancer ([facility] [address]     Diabetes mellitus ([facility] [address]     Hypertension     Hypothyroidism     Thyroid disease     Urinary incontinence      PSH:    Past Surgical History:   Procedure Laterality Date    THYROIDECTOMY      TUBAL LIGATION            Respiratory ROS: positive for - cough, see HPI Otherwise, a complete review of systems is undertaken and is negative.     Social History:  Social History     Socioeconomic History    Marital status:      Spouse name: Not on file    Number of children: Not on file    Years of education: Not on file    Highest education level: Not on file   Occupational History    Not on file   Tobacco Use    Smoking status: Never Smoker    Smokeless tobacco: Never Used   Vaping Use    Vaping Use: Never used   Substance and Sexual Activity    Alcohol use: No    Drug use: No    Sexual activity: Yes   Other Topics Concern    Not on file   Social History Narrative    Not on file     Social Determinants of Health     Financial Resource Strain:     Difficulty of Paying Living Expenses:    Food Insecurity:     Worried About Running Out of Food in the Last Year:     [address] in the Last Year:    Transportation Needs:     Lack of Transportation (Medical):      Lack of Transportation (Non-Medical):    Physical Activity:     Days of Exercise per Week:     Minutes of Exercise per Session:    Stress:     Feeling of Stress :    Social Connections:     Frequency of Communication with Friends and Family:     Frequency of Social Gatherings with Friends and Family:     Attends [employer] Services:     Active Member of Clubs or Organizations:     Attends Club or Organization Meetings:     Marital Status:    Intimate Partner Violence:     Fear of Current or Ex-Partner:     Emotionally Abused:     Physically Abused:     Sexually Abused:        Family History:  Family History   Problem Relation Age of Onset    Diabetes Mother     Hypertension Mother     Stroke Mother     Diabetes Father     Hypertension Father     Thyroid Disease Daughter     Down Syndrome Daughter     Diabetes Maternal Grandmother     Hypertension Maternal Grandmother        Medications:     sodium chloride      dextrose        sodium chloride flush  5-40 mL IntraVENous 2 times per day    enoxaparin  40 mg SubCUTAneous BID    Vitamin D  2,000 Units Oral Daily    dexamethasone  6 mg IntraVENous Q24H    zinc sulfate  50 mg Oral Daily    ascorbic acid  500 mg Oral BID    pravastatin  20 mg Oral Daily    levothyroxine  150 mcg Oral Daily    pantoprazole  40 mg Oral Every Other Day    remdesivir IVPB  100 mg IntraVENous Q24H    insulin lispro  0-12 Units SubCUTAneous TID WC    insulin lispro  0-6 Units SubCUTAneous Nightly         Vitals:    VITALS:  /66   Pulse 86   Temp 97.8 °F (36.6 °C) (Oral)   Resp 20   SpO2 92%   24HR INTAKE/OUTPUT:      Intake/Output Summary (Last 24 hours) at 2021 0910  Last data filed at 2021 1443  Gross per 24 hour   Intake 120 ml   Output 300 ml   Net -180 ml     CURRENT PULSE OXIMETRY:  SpO2: 92 %  24HR PULSE OXIMETRY RANGE:  SpO2  Av %  Min: 90 %  Max: 94 %      EXAM:  General: No distress. Eyes:  No sclera icterus. No conjunctival injection. ENT: No discharge. Pharynx clear. Neck: Trachea midline. Normal thyroid. Resp: No accessory muscle use. Coarse breath sounds b/l.   CV: Regular rate. Regular rhythm. No mumur or rub. ABD: Non-tender. Non-distended. No masses. No organmegaly. Normal bowel sounds. Skin: Warm and dry. No nodule on exposed extremities. No rash on exposed extremities. Lymph: No cervical LAD. No supraclavicular LAD. Ext: No joint deformity. No clubbing. No cyanosis. RLE with mild swelling > left  Neuro: Awake. Follows commands. Lab Results:  CBC:   Recent Labs     21   WBC 4.1* 4.0*   HGB 12.6 11.9   HCT 37.1 36.9   MCV 93.0 93.4    154     BMP:   Recent Labs     21  0600    135 137   K 4.0 4.4 4.6   CL 97* 101 102   CO2 27 24 26   BUN 18 18 23   CREATININE 1.2* 0.9 1.0     LFT:   Recent Labs     21  0600   ALKPHOS 75 69 70   ALT 34* 40* 37*   AST 43* 49* 39*   PROT 6.6 6.2* 6.4   BILITOT 0.2 <0.2 <0.2   LABALBU 3.2* 3.6 3.3*     PT/INR: No results for input(s): PROTIME, INR in the last 72 hours. Cultures:  No results for input(s): CULTRESP in the last 72 hours. ABG:   No results for input(s): PH, PO2, PCO2, HCO3, BE, O2SAT in the last 72 hours.     Films:  XR CHEST PORTABLE    Result Date: 8/30/2021  EXAMINATION: ONE X-RAY VIEW OF THE CHEST 8/30/2021 7:32 am COMPARISON: 12/05/2018 HISTORY: ORDERING SYSTEM PROVIDED HISTORY: shortness of breath TECHNOLOGIST PROVIDED HISTORY: Reason for exam:->shortness of breath FINDINGS: The cardiac silhouette is within normal limits. There is a patchy infiltrate seen within the right perihilar region and right lower lobe and within the left lower lobe. There is no right or left pleural effusion. Patchy multifocal bilateral airspace disease. Assessment:  · Acute hypoxic respiratory failure secondary to COVID-19 pneumonia  · COVID19 pneumonia - procalcitonin 0.30-->0.25  · Elevated liver enzymes  · 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  · Uncontrolled T2DM    ALT/AST 34/43-->37/39  CRP 12-->7    lactic acid 1.7   D-dimer 238-->(<200)    Plan:  · Support O2 for pox < 88%. O2 needs fluctuating from 3-5L since admission on 8/30. · Continue decadron 6mg daily for 10 days  · Continue remdesivir (day 3 of 5)  · DVT prophylaxis - on daily lovenox - check ultrasound BLE  · Follow labs and inflammatory markers   · On Vitamin D, C, and Zinc  · Continue Robitussin prn and add albuterol prn  · Will discuss addition of Toci with Dr. Sveta Brown as liver enzymes elevated. · Procalcitonin elevated so will add Levaquin IV      Thank you for allowing us to see this patient in consultation. The above was reviewed with Dr. Sveta Brown. Please contact us with any questions. Electronically signed by DARON Moura CNP on 9/1/2021 at 9:10 AM     Seen and evaluated, agree with above assessment and plan  Acute respiratory failure secondary to COVID-19 pneumonia.   Unfortunately unvaccinated  Labs and films seen  Agree with the use of remdesivir and dexamethasone  Need blood sugar control  No need of Tocilizumab at this time

## 2021-09-02 NOTE — PROGRESS NOTES
Pt sitting in chair in room, pulse ox 87% on 5 liters. Oxygen increased to 6 liters,, pulse ox remained same. Pt pale, with darkened orbits. Visibly sob.   Dr Magdy Lawrence notified and orders received  Will monitor pt

## 2021-09-02 NOTE — PROGRESS NOTES
Dr Nino Beck notified of ld results, covering dr Alanna Zaldivar, via [address]  [phone]  Dr Melania Baca aware of lab result, read message on perfect serve

## 2021-09-02 NOTE — PROGRESS NOTES
Pt remains in bedside chair, stating she did not want to move.   Oxygen remains at 6 liters pulse ox 89%

## 2021-09-02 NOTE — PROGRESS NOTES
Pulmonary Progress Note    Admit Date: 2021  Requesting Physician: Enoc Barrera MD    CC:  COVID-19 Pneumonia    Subjective: Sitting up in chair at bedside, on 6L n/c with pox 94%. Persistent cough, dry without sputum. Admits to feeling anxious about being sick. Up to bathroom with minimal dyspnea. Denies CP. Feels her BLE are better and without the tightness she was feeling yesterday. US was negative for DVT. Medications:     sodium chloride      dextrose        insulin glargine  10 Units SubCUTAneous Nightly    amLODIPine  5 mg Oral Daily    losartan  100 mg Oral Daily    And    hydroCHLOROthiazide  25 mg Oral Daily    levoFLOXacin  750 mg Oral Daily    sodium chloride flush  5-40 mL IntraVENous 2 times per day    enoxaparin  40 mg SubCUTAneous BID    Vitamin D  2,000 Units Oral Daily    dexamethasone  6 mg IntraVENous Q24H    zinc sulfate  50 mg Oral Daily    ascorbic acid  500 mg Oral BID    pravastatin  20 mg Oral Daily    levothyroxine  150 mcg Oral Daily    pantoprazole  40 mg Oral Every Other Day    remdesivir IVPB  100 mg IntraVENous Q24H    insulin lispro  0-12 Units SubCUTAneous TID WC    insulin lispro  0-6 Units SubCUTAneous Nightly         Vitals:    VITALS:  /68   Pulse 86   Temp 97.4 °F (36.3 °C) (Infrared)   Resp 20   SpO2 (!) 89% Comment: up in chair  24HR INTAKE/OUTPUT:      Intake/Output Summary (Last 24 hours) at 2021 1002  Last data filed at 2021 2221  Gross per 24 hour   Intake 120 ml   Output 700 ml   Net -580 ml     CURRENT PULSE OXIMETRY:  SpO2: (!) 89 % (up in chair)  24HR PULSE OXIMETRY RANGE:  SpO2  Av.5 %  Min: 89 %  Max: 92 %      EXAM:  General: No distress. ENT: No discharge. Pharynx clear. Neck: Trachea midline. Resp: No accessory muscle use. Breath sounds fairly clear, no wheezing/rales/rhonchi  CV: Regular rate. Regular rhythm. No mumur or rub. ABD: Non-tender. Non-distended. Skin: Warm and dry.  No rash on exposed extremities. Ext: BLE appear equal in size today, trace edema b/l. Neuro: Awake. Follows commands. Lab Results:  CBC:   Recent Labs     08/31/21 0435   WBC 4.0*   HGB 11.9   HCT 36.9   MCV 93.4        BMP:   Recent Labs     08/31/21  0435 09/01/21  0600 09/02/21  0900    137 137   K 4.4 4.6 4.6    102 101   CO2 24 26 26   BUN 18 23 25*   CREATININE 0.9 1.0 0.9     LFT:   Recent Labs     08/31/21  0435 09/01/21  0600 09/02/21  0900   ALKPHOS 69 70 76   ALT 40* 37* 30   AST 49* 39* 36*   PROT 6.2* 6.4 6.8   BILITOT <0.2 <0.2 0.3   LABALBU 3.6 3.3* 3.5     PT/INR: No results for input(s): PROTIME, INR in the last 72 hours. Cultures:  No results for input(s): CULTRESP in the last 72 hours. ABG:   No results for input(s): PH, PO2, PCO2, HCO3, BE, O2SAT in the last 72 hours. Films:  XR CHEST PORTABLE    Result Date: 8/30/2021  EXAMINATION: ONE X-RAY VIEW OF THE CHEST 8/30/2021 7:32 am COMPARISON: 12/05/2018 HISTORY: ORDERING SYSTEM PROVIDED HISTORY: shortness of breath TECHNOLOGIST PROVIDED HISTORY: Reason for exam:->shortness of breath FINDINGS: The cardiac silhouette is within normal limits. There is a patchy infiltrate seen within the right perihilar region and right lower lobe and within the left lower lobe. There is no right or left pleural effusion. Patchy multifocal bilateral airspace disease. 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   · Continue decadron 6mg daily for 10 days  · Continue remdesivir (day 4 of 5)  · DVT prophylaxis - on daily lovenox   · Follow labs and inflammatory markers   · On Vitamin D, C, and Zinc  · Change Robitussin and albuterol to straight to aid cough  · Defer Tocilizumab at this time  · Procalcitonin elevated so added Levaquin on 9/1/21 (total 7 days)    Previous abg orders noted, attempts x 2 by nursing without success. Serum CO2 26. No further attempts needed at this time. Sitting up in chair with improved symptoms so will continue to wean O2 as able. CXR pending for this morning. Above discussed with nursing. Electronically signed by DARON Calixto CNP on 9/2/2021 at 10:02 AM     Seen and evaluated, and agree with above assessment and plan  Feeling better this afternoon compared with this morning.   ABGs and chest x-ray seen  We going to obtain LDH levels, if more than 300 we will consider to increase dexamethasone to twice a day for a couple of days

## 2021-09-02 NOTE — PROGRESS NOTES
[facility] Progress Note    Admitting Date and Time: 8/30/2021  2:25 PM  Admit Dx: Pneumonia due to COVID-19 virus [U07.1, J12.82]    Subjective:  Patient is being followed for Pneumonia due to COVID-19 virus [U07.1, J12.82]   Pt still hypoxic at 89% on 5 L    ROS: denies fever, chills, cp, n/v, HA unless stated above.       levoFLOXacin  750 mg Oral Daily    sodium chloride flush  5-40 mL IntraVENous 2 times per day    enoxaparin  40 mg SubCUTAneous BID    Vitamin D  2,000 Units Oral Daily    dexamethasone  6 mg IntraVENous Q24H    zinc sulfate  50 mg Oral Daily    ascorbic acid  500 mg Oral BID    pravastatin  20 mg Oral Daily    levothyroxine  150 mcg Oral Daily    pantoprazole  40 mg Oral Every Other Day    remdesivir IVPB  100 mg IntraVENous Q24H    insulin lispro  0-12 Units SubCUTAneous TID WC    insulin lispro  0-6 Units SubCUTAneous Nightly     sodium chloride flush, 5-40 mL, PRN  sodium chloride, 25 mL, PRN  ondansetron, 4 mg, Q8H PRN   Or  ondansetron, 4 mg, Q6H PRN  polyethylene glycol, 17 g, Daily PRN  acetaminophen, 650 mg, Q6H PRN   Or  acetaminophen, 650 mg, Q6H PRN  guaiFENesin-dextromethorphan, 5 mL, Q4H PRN  glucose, 15 g, PRN  dextrose, 12.5 g, PRN  glucagon (rDNA), 1 mg, PRN  dextrose, 100 mL/hr, PRN  sodium chloride, 30 mL, PRN         Objective:    /68   Pulse 86   Temp 97.4 °F (36.3 °C) (Infrared)   Resp 20   SpO2 (!) 89% Comment: up in chair    General Appearance: alert and oriented to person, place and time   Skin: warm and dry  Head: normocephalic and atraumatic  Eyes: pupils equal, round, and reactive to light, extraocular eye movements intact, conjunctivae normal  Neck: neck supple and non tender without mass   Pulmonary/Chest: crackles bilaterally- no wheezes, normal air movement, mild respiratory distress  Cardiovascular: normal rate, normal S1 and S2 and no carotid bruits  Abdomen: soft, non-tender, non-distended, normal bowel sounds, no masses or organomegaly  Extremities: no cyanosis, no clubbing and no edema  Neurologic: no cranial nerve deficit and speech normal        Recent Labs     08/31/21  0435 09/01/21  0600    137   K 4.4 4.6    102   CO2 24 26   BUN 18 23   CREATININE 0.9 1.0   GLUCOSE 260* 291*   CALCIUM 9.3 9.4       Recent Labs     08/31/21  0435   WBC 4.0*   RBC 3.95   HGB 11.9   HCT 36.9   MCV 93.4   MCH 30.1   MCHC 32.2   RDW 12.4      MPV 10.5       Assessment:    Active Problems:    Pneumonia due to COVID-19 virus    Acute respiratory failure with hypoxia (HCC)    Elevated LFTs    Uncontrolled type 2 diabetes mellitus with hyperglycemia ([facility] [address]  Resolved Problems:    * No resolved hospital problems. *      Plan:  1. Acute hypoxic aspiratory failure oxygen saturation was 87% on room air, currently still requiring 5 L with oxygen saturation 89 to 92%, due to COVID-19 pneumonia, will treat underlying process and wean off oxygen. 2.  COVID-19 pneumonia, start patient on Decadron and remdesivir, inflammatory markers improved, continue monitoring. 3.  Superimposed bacterial pneumonia with elevated procalcitonin, patient was started on Levaquin, appreciate pulmonary input  4. Elevated transaminases, most likely due to Covid infection, improved continue monitoring. 5.  History of diabetes type 2, blood glucose is not controlled due to steroids usually on Trulicity, Metformin and flimepiride, those were placed on hold continue sliding scale insulin, I will add Lantus 10 units  6. History of hypertension, blood pressure is acceptable, antihypertensive medication were not continued on admission for some reason, I will restart amlodipine, losartan/hydrochlorthiazide. 7.  History of hypothyroidism, continue levothyroxine      NOTE: This report was transcribed using voice recognition software. Every effort was made to ensure accuracy; however, inadvertent computerized transcription errors may be present.   Electronically signed by Donavan Julian MD on 9/2/2021 at 8:38 AM

## 2021-09-02 NOTE — CARE COORDINATION
COVID + 8/30. On day 4 Remdesivir, iv Decadron, po abx. Increased O2 demand to O2 6l high flow NC- [facility] DEM following- will need O2 testing/ order if unable to wean off at discharge. Plan remains to return home w/  on discharge.  Will follow  Emily Vivas RN case manager

## 2021-09-03 NOTE — PROGRESS NOTES
[facility] Progress Note    Admitting Date and Time: 8/30/2021  2:25 PM  Admit Dx: Pneumonia due to COVID-19 virus [U07.1, J12.82]    Subjective:  Patient is being followed for Pneumonia due to COVID-19 virus [U07.1, J12.82]   Pt still on 6L with O2 sat 94%    ROS: denies fever, chills, cp, n/v, HA unless stated above.       insulin glargine  10 Units SubCUTAneous Nightly    amLODIPine  5 mg Oral Daily    losartan  100 mg Oral Daily    And    hydroCHLOROthiazide  25 mg Oral Daily    guaiFENesin-dextromethorphan  5 mL Oral Q6H    albuterol sulfate HFA  2 puff Inhalation Q4H WA    levoFLOXacin  750 mg Oral Daily    sodium chloride flush  5-40 mL IntraVENous 2 times per day    enoxaparin  40 mg SubCUTAneous BID    Vitamin D  2,000 Units Oral Daily    dexamethasone  6 mg IntraVENous Q24H    zinc sulfate  50 mg Oral Daily    ascorbic acid  500 mg Oral BID    pravastatin  20 mg Oral Daily    levothyroxine  150 mcg Oral Daily    pantoprazole  40 mg Oral Every Other Day    remdesivir IVPB  100 mg IntraVENous Q24H    insulin lispro  0-12 Units SubCUTAneous TID WC    insulin lispro  0-6 Units SubCUTAneous Nightly     sodium chloride flush, 5-40 mL, PRN  sodium chloride, 25 mL, PRN  ondansetron, 4 mg, Q8H PRN   Or  ondansetron, 4 mg, Q6H PRN  polyethylene glycol, 17 g, Daily PRN  acetaminophen, 650 mg, Q6H PRN   Or  acetaminophen, 650 mg, Q6H PRN  glucose, 15 g, PRN  dextrose, 12.5 g, PRN  glucagon (rDNA), 1 mg, PRN  dextrose, 100 mL/hr, PRN  sodium chloride, 30 mL, PRN         Objective:    /66   Pulse 88   Temp 97.8 °F (36.6 °C) (Oral)   Resp 22   SpO2 94%     General Appearance: alert and oriented to person, place and time   Skin: warm and dry  Head: normocephalic and atraumatic  Eyes: pupils equal, round, and reactive to light, extraocular eye movements intact, conjunctivae normal  Neck: neck supple and non tender without mass   Pulmonary/Chest: crackles bilaterally- no wheezes, normal air movement, mild respiratory distress  Cardiovascular: normal rate, normal S1 and S2 and no carotid bruits  Abdomen: soft, non-tender, non-distended, normal bowel sounds, no masses or organomegaly  Extremities: no cyanosis, no clubbing and no edema  Neurologic: no cranial nerve deficit and speech normal        Recent Labs     09/01/21  0600 09/02/21  0900 09/03/21  0442    137 137   K 4.6 4.6 4.3    101 104   CO2 26 26 25   BUN 23 25* 26*   CREATININE 1.0 0.9 1.0   GLUCOSE 291* 257* 286*   CALCIUM 9.4 9.4 9.4       No results for input(s): WBC, RBC, HGB, HCT, MCV, MCH, MCHC, RDW, PLT, MPV in the last 72 hours. Assessment:    Active Problems:    Pneumonia due to COVID-19 virus    Acute respiratory failure with hypoxia (HCC)    Elevated LFTs    Uncontrolled type 2 diabetes mellitus with hyperglycemia ([facility] [address]  Resolved Problems:    * No resolved hospital problems. *      Plan:  1. Acute hypoxic aspiratory failure oxygen saturation was 87% on room air, currently still requiring 6 L with oxygen saturation 94%, due to COVID-19 pneumonia, treating underlying process and wean off oxygen. 2.  COVID-19 pneumonia, start patient on Decadron and remdesivir, inflammatory markers improved, continue monitoring. 3.  Superimposed bacterial pneumonia with elevated procalcitonin, patient was started on Levaquin, appreciate pulmonary input  4. Elevated transaminases, most likely due to Covid infection, improved continue monitoring. 5.  History of diabetes type 2, blood glucose is not controlled due to steroids usually on Trulicity, Metformin and flimepiride, those were placed on hold continue sliding scale insulin, I added Lantus 10 units  6. History of hypertension, blood pressure is acceptable, antihypertensive medication were not continued on admission for some reason, I will restart amlodipine, losartan/hydrochlorthiazide.   7.  History of hypothyroidism, continue levothyroxine      NOTE: This report was transcribed using voice recognition software. Every effort was made to ensure accuracy; however, inadvertent computerized transcription errors may be present.   Electronically signed by Christofer Sorto MD on 9/3/2021 at 6:44 AM

## 2021-09-03 NOTE — CARE COORDINATION
COVID + 8/30. On day 5 Remdesivir, iv Decadron, po abx. Requiring O2 6l high flow NC- [facility] DEM following- will need O2 testing/ order if unable to wean off at discharge. Plan remains to return home w/  on discharge.  Will follow Leti Joseph RN case Leander Godoy

## 2021-09-03 NOTE — CARE COORDINATION
Patient/family seen: Yes called patients        Informed patient/family of [facility] Medical Bundle Program with potential outreach by either Care Transitions Team or [facility] Team based on hospital admission and location.        [facility] Notification Letter given: Yes mailing letter         Current discharge plan:

## 2021-09-03 NOTE — PROGRESS NOTES
Pulmonary Progress Note    Admit Date: 2021  Requesting Physician: Diony Steel MD    CC:  COVID-19 Pneumonia    Subjective: Sitting up in chair at bedside, on 5L n/c with pox 86-87%%. Cough improved with addition of albuterol. LD up to 636. ABG obtained yesterday shows pO2 53. Medications:     sodium chloride      dextrose        dexamethasone  6 mg IntraVENous Q12H    insulin glargine  10 Units SubCUTAneous Nightly    amLODIPine  5 mg Oral Daily    losartan  100 mg Oral Daily    And    hydroCHLOROthiazide  25 mg Oral Daily    guaiFENesin-dextromethorphan  5 mL Oral Q6H    albuterol sulfate HFA  2 puff Inhalation Q4H WA    levoFLOXacin  750 mg Oral Daily    sodium chloride flush  5-40 mL IntraVENous 2 times per day    enoxaparin  40 mg SubCUTAneous BID    Vitamin D  2,000 Units Oral Daily    zinc sulfate  50 mg Oral Daily    ascorbic acid  500 mg Oral BID    pravastatin  20 mg Oral Daily    levothyroxine  150 mcg Oral Daily    pantoprazole  40 mg Oral Every Other Day    remdesivir IVPB  100 mg IntraVENous Q24H    insulin lispro  0-12 Units SubCUTAneous TID WC    insulin lispro  0-6 Units SubCUTAneous Nightly         Vitals:    VITALS:  /68   Pulse 84   Temp 97.3 °F (36.3 °C) (Infrared)   Resp 20   SpO2 (!) 89%   24HR INTAKE/OUTPUT:    No intake or output data in the 24 hours ending 21 0940  CURRENT PULSE OXIMETRY:  SpO2: (!) 89 %  24HR PULSE OXIMETRY RANGE:  SpO2  Av.3 %  Min: 89 %  Max: 94 %      EXAM:  General: No distress. ENT: thick pink tongue, tender  Neck: Trachea midline. Resp: No accessory muscle use. Breath sounds fairly clear, no wheezing/rales/rhonchi  CV: Regular rate. Regular rhythm. No mumur or rub. ABD: Non-tender. Non-distended. Skin: Warm and dry. No rash on exposed extremities. Ext: BLE appear equal in size today, trace edema b/l. Neuro: Awake. Follows commands.      Lab Results:  CBC:   No results for input(s): WBC, HGB, HCT, MCV, PLT in the last 72 hours. BMP:   Recent Labs     09/01/21  0600 09/02/21  0900 09/03/21  0442    137 137   K 4.6 4.6 4.3    101 104   CO2 26 26 25   BUN 23 25* 26*   CREATININE 1.0 0.9 1.0     LFT:   Recent Labs     09/01/21  0600 09/02/21  0900 09/03/21  0442   ALKPHOS 70 76 66   ALT 37* 30 26   AST 39* 36* 23   PROT 6.4 6.8 6.2*   BILITOT <0.2 0.3 0.3   LABALBU 3.3* 3.5 3.2*     PT/INR: No results for input(s): PROTIME, INR in the last 72 hours. Cultures:  No results for input(s): CULTRESP in the last 72 hours. ABG:   Recent Labs     09/02/21  1018   PH 7.459*   PO2 53.7*   PCO2 33.2*   HCO3 23.0   BE -0.1   O2SAT 89.2*       Films:  XR CHEST PORTABLE    Result Date: 8/30/2021  EXAMINATION: ONE X-RAY VIEW OF THE CHEST 8/30/2021 7:32 am COMPARISON: 12/05/2018 HISTORY: ORDERING SYSTEM PROVIDED HISTORY: shortness of breath TECHNOLOGIST PROVIDED HISTORY: Reason for exam:->shortness of breath FINDINGS: The cardiac silhouette is within normal limits. There is a patchy infiltrate seen within the right perihilar region and right lower lobe and within the left lower lobe. There is no right or left pleural effusion. Patchy multifocal bilateral airspace disease. 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   · Continue decadron   · Continue remdesivir (day 5 of 5)  · DVT prophylaxis - on daily lovenox   · Follow labs and inflammatory markers   · On Vitamin D, C, and Zinc  · Change Robitussin and albuterol to straight to aid cough  · Defer Tocilizumab at this time  · Procalcitonin elevated so added Levaquin on 9/1/21 (total 7 days)    CXR 9/2 showed stable infiltrates, with poor oxygenation on 5L n/c, will increase back to 6L, increase decadron to bid, consider q8hrs if requiring more than 6L. Add nystatin for thrush.     Electronically signed by DARON Jimenez CNP on 9/3/2021 at 9:40 AM     Seen and evaluated, and agree with above assessment and plan  LDH quite elevated, will increase dexamethasone

## 2021-09-04 NOTE — PROGRESS NOTES
[facility] Progress Note    Admitting Date and Time: 8/30/2021  2:25 PM  Admit Dx: Pneumonia due to COVID-19 virus [U07.1, J12.82]    Subjective:  Patient is being followed for Pneumonia due to COVID-19 virus [U07.1, J12.82]   Pt still on 6L with O2 sat 90%, reported breathing well, but having some coughing fits and shortness of breath when ambulating    ROS: denies fever, chills, cp, n/v, HA unless stated above.       dexamethasone  6 mg IntraVENous Q12H    nystatin  5 mL Oral 4x Daily    insulin glargine  10 Units SubCUTAneous Nightly    amLODIPine  5 mg Oral Daily    losartan  100 mg Oral Daily    And    hydroCHLOROthiazide  25 mg Oral Daily    guaiFENesin-dextromethorphan  5 mL Oral Q6H    albuterol sulfate HFA  2 puff Inhalation Q4H WA    levoFLOXacin  750 mg Oral Daily    sodium chloride flush  5-40 mL IntraVENous 2 times per day    enoxaparin  40 mg SubCUTAneous BID    Vitamin D  2,000 Units Oral Daily    zinc sulfate  50 mg Oral Daily    ascorbic acid  500 mg Oral BID    pravastatin  20 mg Oral Daily    levothyroxine  150 mcg Oral Daily    pantoprazole  40 mg Oral Every Other Day    insulin lispro  0-12 Units SubCUTAneous TID WC    insulin lispro  0-6 Units SubCUTAneous Nightly     sodium chloride flush, 5-40 mL, PRN  sodium chloride, 25 mL, PRN  ondansetron, 4 mg, Q8H PRN   Or  ondansetron, 4 mg, Q6H PRN  polyethylene glycol, 17 g, Daily PRN  acetaminophen, 650 mg, Q6H PRN   Or  acetaminophen, 650 mg, Q6H PRN  glucose, 15 g, PRN  dextrose, 12.5 g, PRN  glucagon (rDNA), 1 mg, PRN  dextrose, 100 mL/hr, PRN  sodium chloride, 30 mL, PRN         Objective:    /74   Pulse 77   Temp 97 °F (36.1 °C) (Oral)   Resp 22   SpO2 90%     General Appearance: alert and oriented to person, place and time   Skin: warm and dry  Head: normocephalic and atraumatic  Eyes: pupils equal, round, and reactive to light, extraocular eye movements intact, conjunctivae normal  Neck: neck supple and non tender without mass   Pulmonary/Chest: decreased sounds bilaterally- no wheezes, normal air movement, no respiratory distress  Cardiovascular: normal rate, normal S1 and S2 and no carotid bruits  Abdomen: soft, non-tender, non-distended, normal bowel sounds, no masses or organomegaly  Extremities: no cyanosis, no clubbing and no edema  Neurologic: no cranial nerve deficit and speech normal        Recent Labs     09/02/21  0900 09/03/21  0442 09/04/21  0540    137 137   K 4.6 4.3 4.6    104 104   CO2 26 25 22   BUN 25* 26* 26*   CREATININE 0.9 1.0 1.0   GLUCOSE 257* 286* 335*   CALCIUM 9.4 9.4 9.5       No results for input(s): WBC, RBC, HGB, HCT, MCV, MCH, MCHC, RDW, PLT, MPV in the last 72 hours. Assessment:    Active Problems:    Pneumonia due to COVID-19 virus    Acute respiratory failure with hypoxia (HCC)    Elevated LFTs    Uncontrolled type 2 diabetes mellitus with hyperglycemia ([facility] [address]  Resolved Problems:    * No resolved hospital problems. *      Plan:  1. Acute hypoxic aspiratory failure oxygen saturation was 87% on room air, currently still requiring 6 L with oxygen saturation 90%, will decrease to 5 and monitor due to COVID-19 pneumonia, treating underlying process and wean off oxygen. 2.  COVID-19 pneumonia, start patient on Decadron and remdesivir, inflammatory markers improved, continue monitoring. 3.  Superimposed bacterial pneumonia with elevated procalcitonin, patient was started on Levaquin, appreciate pulmonary input  4. Elevated transaminases, most likely due to Covid infection, improved continue monitoring. 5.  History of diabetes type 2, blood glucose is not controlled due to steroids usually on Trulicity, Metformin and flimepiride, those were placed on hold continue sliding scale insulin, I added Lantus 10 units, increase to 20 units, increase SSI to high dose  6.   History of hypertension, blood pressure is acceptable, antihypertensive medication were not continued on admission for some reason, I will restart amlodipine, losartan/hydrochlorthiazide. 7.  History of hypothyroidism, continue levothyroxine      NOTE: This report was transcribed using voice recognition software. Every effort was made to ensure accuracy; however, inadvertent computerized transcription errors may be present.   Electronically signed by Alta Kim MD on 9/4/2021 at 9:40 AM

## 2021-09-04 NOTE — PROGRESS NOTES
Pulmonary Progress Note    Admit Date: 2021  Requesting Physician: Warden Kaleb MD    CC:  COVID-19 Pneumonia    Subjective: Sitting up in chair at bedside, on 5L n/c. Cough improved with addition of albuterol. Still winded with exertion but feeling better       Medications:     sodium chloride      dextrose        insulin glargine  20 Units SubCUTAneous Nightly    insulin lispro  0-18 Units SubCUTAneous TID WC    insulin lispro  0-9 Units SubCUTAneous Nightly    dexamethasone  6 mg IntraVENous Q12H    nystatin  5 mL Oral 4x Daily    amLODIPine  5 mg Oral Daily    losartan  100 mg Oral Daily    And    hydroCHLOROthiazide  25 mg Oral Daily    guaiFENesin-dextromethorphan  5 mL Oral Q6H    albuterol sulfate HFA  2 puff Inhalation Q4H WA    levoFLOXacin  750 mg Oral Daily    sodium chloride flush  5-40 mL IntraVENous 2 times per day    enoxaparin  40 mg SubCUTAneous BID    Vitamin D  2,000 Units Oral Daily    zinc sulfate  50 mg Oral Daily    ascorbic acid  500 mg Oral BID    pravastatin  20 mg Oral Daily    levothyroxine  150 mcg Oral Daily    pantoprazole  40 mg Oral Every Other Day         Vitals:    VITALS:  /61   Pulse 76   Temp 97.6 °F (36.4 °C) (Oral)   Resp 18   SpO2 91%   24HR INTAKE/OUTPUT:      Intake/Output Summary (Last 24 hours) at 2021 1122  Last data filed at 2021 0622  Gross per 24 hour   Intake 100 ml   Output 1500 ml   Net -1400 ml     CURRENT PULSE OXIMETRY:  SpO2: 91 %  24HR PULSE OXIMETRY RANGE:  SpO2  Av.5 %  Min: 90 %  Max: 91 %      EXAM:  General: No distress. ENT:MMM  Neck: Trachea midline. Resp: No accessory muscle use. Breath sounds fairly clear, no wheezing/rales/rhonchi  CV: Regular rate. Regular rhythm. No mumur or rub. ABD: Non-tender. Non-distended. Skin: Warm and dry. No rash on exposed extremities. Ext: BLE appear equal in size today, trace edema b/l. Neuro: Awake. Follows commands. ox3.  pleasant      Lab Results:  CBC:   No results for input(s): WBC, HGB, HCT, MCV, PLT in the last 72 hours. BMP:   Recent Labs     09/02/21  0900 09/03/21  0442 09/04/21  0540    137 137   K 4.6 4.3 4.6    104 104   CO2 26 25 22   BUN 25* 26* 26*   CREATININE 0.9 1.0 1.0     LFT:   Recent Labs     09/02/21  0900 09/03/21  0442 09/04/21  0540   ALKPHOS 76 66 70   ALT 30 26 24   AST 36* 23 28   PROT 6.8 6.2* 6.3*   BILITOT 0.3 0.3 0.3   LABALBU 3.5 3.2* 3.5     PT/INR: No results for input(s): PROTIME, INR in the last 72 hours. Cultures:  Results for Caitlin Chavez (MRN [id number]) as of 9/4/2021 11:23   Ref. Range 8/30/2021 08:25   SARS-CoV-2, NAAT Latest Ref Range: Not Detected  DETECTED (A)       ABG:   Recent Labs     09/02/21  1018   PH 7.459*   PO2 53.7*   PCO2 33.2*   HCO3 23.0   BE -0.1   O2SAT 89.2*       Films:  XR CHEST PORTABLE    Result Date: 8/30/2021  EXAMINATION: ONE X-RAY VIEW OF THE CHEST 8/30/2021 7:32 am COMPARISON: 12/05/2018 HISTORY: ORDERING SYSTEM PROVIDED HISTORY: shortness of breath TECHNOLOGIST PROVIDED HISTORY: Reason for exam:->shortness of breath FINDINGS: The cardiac silhouette is within normal limits. There is a patchy infiltrate seen within the right perihilar region and right lower lobe and within the left lower lobe. There is no right or left pleural effusion. Patchy multifocal bilateral airspace disease. 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 currentyl on 5-6L  · Continue decadron 6mg IV BID  · Completed  remdesivir (day 5 of 5)  · DVT prophylaxis - on daily lovenox , d-dimer low   · Follow labs and inflammatory markers repeat LD  · On Vitamin D, C, and Zinc  · Change Robitussin and albuterol to straight to aid cough  · Defer Tocilizumab at this time  · Procalcitonin elevated so added Levaquin on 9/1/21 (total 7 days) 0.30--->.25--->.25--->.15  · On nystatin  · Encourage prone position, activity as tolerated   · Isolation  · IS         Electronically signed by DARON Bernardo on 9/4/2021 at 11:22 AM

## 2021-09-04 NOTE — PLAN OF CARE
Problem: Airway Clearance - Ineffective  Goal: Achieve or maintain patent airway  Outcome: Met This Shift     Problem: Gas Exchange - Impaired  Goal: Absence of hypoxia  Outcome: Met This Shift  Goal: Promote optimal lung function  Outcome: Met This Shift

## 2021-09-04 NOTE — PROGRESS NOTES
Patient encouraged to use incentive spirometer x10 every hour and to use proning position. Told patient that we would check in via telephone every hour. All need met at this time.

## 2021-09-05 NOTE — PLAN OF CARE
Problem: Gas Exchange - Impaired  Goal: Absence of hypoxia  Outcome: Not Met This Shift  Goal: Promote optimal lung function  Outcome: Not Met This Shift     Problem: Breathing Pattern - Ineffective  Goal: Ability to achieve and maintain a regular respiratory rate  Outcome: Not Met This Shift     Patient continues to have her SPO2 drop with ambulation, which causes patient to become panicked and further complicates her breathing. Patient again taught how to use incentive spirometer and using the teach back method patient was able to successfully use. Patient educated on benefit of lying in the prone position to help fully expand her lungs.

## 2021-09-05 NOTE — PROGRESS NOTES
[facility] Progress Note    Admitting Date and Time: 8/30/2021  2:25 PM  Admit Dx: Pneumonia due to COVID-19 virus [U07.1, J12.82]    Subjective:  Patient is being followed for Pneumonia due to COVID-19 virus [U07.1, J12.82]   Pt still on 6L with O2 sat 90%, failed being on 4 L, desaturated down to the 70s    ROS: denies fever, chills, cp, n/v, HA unless stated above.       insulin glargine  20 Units SubCUTAneous Nightly    insulin lispro  0-18 Units SubCUTAneous TID WC    insulin lispro  0-9 Units SubCUTAneous Nightly    dexamethasone  6 mg IntraVENous Q12H    nystatin  5 mL Oral 4x Daily    amLODIPine  5 mg Oral Daily    losartan  100 mg Oral Daily    And    hydroCHLOROthiazide  25 mg Oral Daily    guaiFENesin-dextromethorphan  5 mL Oral Q6H    albuterol sulfate HFA  2 puff Inhalation Q4H WA    levoFLOXacin  750 mg Oral Daily    sodium chloride flush  5-40 mL IntraVENous 2 times per day    enoxaparin  40 mg SubCUTAneous BID    Vitamin D  2,000 Units Oral Daily    zinc sulfate  50 mg Oral Daily    ascorbic acid  500 mg Oral BID    pravastatin  20 mg Oral Daily    levothyroxine  150 mcg Oral Daily    pantoprazole  40 mg Oral Every Other Day     sodium chloride flush, 5-40 mL, PRN  sodium chloride, 25 mL, PRN  ondansetron, 4 mg, Q8H PRN   Or  ondansetron, 4 mg, Q6H PRN  polyethylene glycol, 17 g, Daily PRN  acetaminophen, 650 mg, Q6H PRN   Or  acetaminophen, 650 mg, Q6H PRN  glucose, 15 g, PRN  dextrose, 12.5 g, PRN  glucagon (rDNA), 1 mg, PRN  dextrose, 100 mL/hr, PRN  sodium chloride, 30 mL, PRN         Objective:    /76   Pulse 67   Temp 97.5 °F (36.4 °C) (Oral)   Resp 20   SpO2 92%     General Appearance: alert and oriented to person, place and time   Skin: warm and dry  Head: normocephalic and atraumatic  Eyes: pupils equal, round, and reactive to light, extraocular eye movements intact, conjunctivae normal  Neck: neck supple and non tender without mass Pulmonary/Chest: decreased sounds bilaterally- no wheezes, normal air movement, no respiratory distress  Cardiovascular: normal rate, normal S1 and S2 and no carotid bruits  Abdomen: soft, non-tender, non-distended, normal bowel sounds, no masses or organomegaly  Extremities: no cyanosis, no clubbing and no edema  Neurologic: no cranial nerve deficit and speech normal        Recent Labs     09/03/21  0442 09/04/21  0540 09/05/21  0330    137 133   K 4.3 4.6 4.5    104 101   CO2 25 22 22   BUN 26* 26* 29*   CREATININE 1.0 1.0 1.0   GLUCOSE 286* 335* 346*   CALCIUM 9.4 9.5 9.8       No results for input(s): WBC, RBC, HGB, HCT, MCV, MCH, MCHC, RDW, PLT, MPV in the last 72 hours. Assessment:    Active Problems:    Pneumonia due to COVID-19 virus    Acute respiratory failure with hypoxia (HCC)    Elevated LFTs    Uncontrolled type 2 diabetes mellitus with hyperglycemia ([facility] [address]  Resolved Problems:    * No resolved hospital problems. *      Plan:  1. Acute hypoxic aspiratory failure oxygen saturation was 87% on room air, currently still requiring 6 L with oxygen saturation 90%, due to COVID-19 pneumonia, treating underlying process and wean off oxygen. 2.  COVID-19 pneumonia, start patient on Decadron and remdesivir, inflammatory markers improved, continue monitoring. 3.  Superimposed bacterial pneumonia with elevated procalcitonin, patient was started on Levaquin, appreciate pulmonary input  4. Elevated transaminases, most likely due to Covid infection, improved continue monitoring. 5.  History of diabetes type 2, blood glucose is not controlled due to steroids usually on Trulicity, Metformin and flimepiride, those were placed on hold continue sliding scale insulin, lantus 20 units,  6. History of hypertension, blood pressure is acceptable, antihypertensive medication were not continued on admission for some reason, restarted amlodipine, losartan/hydrochlorthiazide.   7.  History of hypothyroidism, continue levothyroxine      NOTE: This report was transcribed using voice recognition software. Every effort was made to ensure accuracy; however, inadvertent computerized transcription errors may be present.   Electronically signed by Cristian Paul MD on 9/5/2021 at 9:03 AM

## 2021-09-05 NOTE — PROGRESS NOTES
commands. ox3. pleasant      Lab Results:  CBC:   No results for input(s): WBC, HGB, HCT, MCV, PLT in the last 72 hours. BMP:   Recent Labs     09/03/21 0442 09/04/21  0540 09/05/21  0330    137 133   K 4.3 4.6 4.5    104 101   CO2 25 22 22   BUN 26* 26* 29*   CREATININE 1.0 1.0 1.0     LFT:   Recent Labs     09/03/21 0442 09/04/21  0540 09/05/21  0330   ALKPHOS 66 70 71   ALT 26 24 24   AST 23 28 22   PROT 6.2* 6.3* 6.1*   BILITOT 0.3 0.3 0.3   LABALBU 3.2* 3.5 3.2*     PT/INR: No results for input(s): PROTIME, INR in the last 72 hours. Cultures:  Results for Emily éMndez (MRN [id number]) as of 9/4/2021 11:23   Ref. Range 8/30/2021 08:25   SARS-CoV-2, NAAT Latest Ref Range: Not Detected  DETECTED (A)       ABG:   No results for input(s): PH, PO2, PCO2, HCO3, BE, O2SAT in the last 72 hours. Films:  XR CHEST PORTABLE    Result Date: 8/30/2021  EXAMINATION: ONE X-RAY VIEW OF THE CHEST 8/30/2021 7:32 am COMPARISON: 12/05/2018 HISTORY: ORDERING SYSTEM PROVIDED HISTORY: shortness of breath TECHNOLOGIST PROVIDED HISTORY: Reason for exam:->shortness of breath FINDINGS: The cardiac silhouette is within normal limits. There is a patchy infiltrate seen within the right perihilar region and right lower lobe and within the left lower lobe. There is no right or left pleural effusion. Patchy multifocal bilateral airspace disease. 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 currently down to 4L, will need walking POX prior to dc to evaluate home o2 needs   · Continue decadron 6mg IV BID  · Completed  remdesivir (day 5 of 5)  · DVT prophylaxis - on daily lovenox , d-dimer low   · Follow labs and inflammatory markers repeat LD  · On Vitamin D, C, and Zinc  · Change Robitussin and albuterol to straight to aid cough  · Defer Tocilizumab at this time  · Procalcitonin elevated so added Levaquin on 9/1/21 (total 7 days) 0.30--->.25--->.25--->.15  · On nystatin  · Encourage prone position, activity as tolerated   · Isolation  · IS         Electronically signed by DARON Allen on 9/5/2021 at 10:33 AM

## 2021-09-05 NOTE — PROGRESS NOTES
9/5/2021  3:55 PM      Nutrition Assessment     Type and Reason for Visit: Initial (LOS)    Nutrition Recommendations/Plan: Continue current diet and ONS, as tolerated    Nutrition Assessment:  Pt admit 2/2 Covid+PNA. Hx. DM and current hyperglycemia 2/2 steroids. Pt on Carb Controlled diet. PO average 25-50% currently. Will add HP Ensure and High pro Gelatin to promote adequate nutrient intake . Nutrition Related Findings: abd WDL, A&Ox4, +1 edema, cough/SOB at times, -I/O 2L      Current Nutrition Therapies:    ADULT DIET; Regular; 5 carb choices (75 gm/meal)  Adult Oral Nutrition Supplement; Low Calorie/High Protein Oral Supplement  Adult Oral Nutrition Supplement; Other Oral Supplement; GELATEIN 20    Anthropometric Measures:  · Height: 5' 8\" (172.7 cm)  · Current Body Wt: 213 lb (96.6 kg) (8/30 standing scale)   · BMI: 32.4      Nutrition Interventions:   Food and/or Nutrient Delivery:  Continue Current Diet, Start Oral Nutrition Supplement  Nutrition Education/Counseling:  No recommendation at this time   Coordination of Nutrition Care:  Continue to monitor while inpatient    Goals:  PO >75% at meals and ONS       Nutrition Monitoring and Evaluation:   Behavioral-Environmental Outcomes:  None Identified   Food/Nutrient Intake Outcomes:  Food and Nutrient Intake, Supplement Intake  Physical Signs/Symptoms Outcomes:  Biochemical Data, GI Status, Nausea or Vomiting, Fluid Status or Edema, Skin, Weight, Nutrition Focused Physical Findings     Discharge Planning:     Too soon to determine     Electronically signed by Rebecca De La Cruz RD, CNSC, LD on 9/5/21 at 3:55 PM EDT    Contact: [phone]

## 2021-09-06 NOTE — PROGRESS NOTES
[facility] Progress Note    Admitting Date and Time: 8/30/2021  2:25 PM  Admit Dx: Pneumonia due to COVID-19 virus [U07.1, J12.82]    Subjective:  Patient is being followed for Pneumonia due to COVID-19 virus [U07.1, J12.82]   Pt started having worsening oxygenation and hypoxia requiring to increase O2 nasal cannula to 13 L    ROS: denies fever, chills, cp, n/v, HA unless stated above.       insulin glargine  20 Units SubCUTAneous Nightly    insulin lispro  0-18 Units SubCUTAneous TID WC    insulin lispro  0-9 Units SubCUTAneous Nightly    dexamethasone  6 mg IntraVENous Q12H    nystatin  5 mL Oral 4x Daily    amLODIPine  5 mg Oral Daily    losartan  100 mg Oral Daily    And    hydroCHLOROthiazide  25 mg Oral Daily    guaiFENesin-dextromethorphan  5 mL Oral Q6H    albuterol sulfate HFA  2 puff Inhalation Q4H WA    levoFLOXacin  750 mg Oral Daily    sodium chloride flush  5-40 mL IntraVENous 2 times per day    enoxaparin  40 mg SubCUTAneous BID    Vitamin D  2,000 Units Oral Daily    zinc sulfate  50 mg Oral Daily    ascorbic acid  500 mg Oral BID    pravastatin  20 mg Oral Daily    levothyroxine  150 mcg Oral Daily    pantoprazole  40 mg Oral Every Other Day     sodium chloride flush, 5-40 mL, PRN  sodium chloride, 25 mL, PRN  ondansetron, 4 mg, Q8H PRN   Or  ondansetron, 4 mg, Q6H PRN  polyethylene glycol, 17 g, Daily PRN  acetaminophen, 650 mg, Q6H PRN   Or  acetaminophen, 650 mg, Q6H PRN  glucose, 15 g, PRN  dextrose, 12.5 g, PRN  glucagon (rDNA), 1 mg, PRN  dextrose, 100 mL/hr, PRN  sodium chloride, 30 mL, PRN         Objective:    /70   Pulse 70   Temp 96.8 °F (36 °C) (Oral)   Resp 18   Ht 5' 8\" (1.727 m)   SpO2 (!) 83%   BMI 32.39 kg/m²     General Appearance: alert and oriented to person, place and time   Skin: warm and dry  Head: normocephalic and atraumatic  Eyes: pupils equal, round, and reactive to light, extraocular eye movements intact, conjunctivae normal  Neck: neck supple and non tender without mass   Pulmonary/Chest: decreased sounds bilaterally- no wheezes, normal air movement, no respiratory distress  Cardiovascular: normal rate, normal S1 and S2 and no carotid bruits  Abdomen: soft, non-tender, non-distended, normal bowel sounds, no masses or organomegaly  Extremities: no cyanosis, no clubbing and no edema  Neurologic: no cranial nerve deficit and speech normal        Recent Labs     09/04/21  0540 09/05/21  0330 09/06/21  0455    133 136   K 4.6 4.5 4.7    101 101   CO2 22 22 24   BUN 26* 29* 29*   CREATININE 1.0 1.0 1.0   GLUCOSE 335* 346* 333*   CALCIUM 9.5 9.8 9.8       No results for input(s): WBC, RBC, HGB, HCT, MCV, MCH, MCHC, RDW, PLT, MPV in the last 72 hours. Assessment:    Active Problems:    Pneumonia due to COVID-19 virus    Acute respiratory failure with hypoxia (HCC)    Elevated LFTs    Uncontrolled type 2 diabetes mellitus with hyperglycemia ([facility] [address]  Resolved Problems:    * No resolved hospital problems. *      Plan:  1. Acute hypoxic aspiratory failure oxygen saturation was 87% on room air, currently requiring 13L with oxygen saturation 90%, due to COVID-19 pneumonia, treating underlying process and wean off oxygen. Will obtain a CTA chest  2. COVID-19 pneumonia, start patient on Decadron and remdesivir, inflammatory markers improved, yet due to the worsening oxygenation I will repeat CRP and D-dimer. 3.  Superimposed bacterial pneumonia with elevated procalcitonin, patient was started on Levaquin, appreciate pulmonary input  4. Elevated transaminases, most likely due to Covid infection, improved continue monitoring. 5.  History of diabetes type 2, blood glucose is not controlled due to steroids usually on Trulicity, Metformin and flimepiride, those were placed on hold continue sliding scale insulin, lantus 20 units,  6.   History of hypertension, blood pressure is acceptable, antihypertensive medication were not continued on admission for some reason, restarted amlodipine, losartan/hydrochlorthiazide. 7.  History of hypothyroidism, continue levothyroxine      NOTE: This report was transcribed using voice recognition software. Every effort was made to ensure accuracy; however, inadvertent computerized transcription errors may be present.   Electronically signed by Cecily Bird MD on 9/6/2021 at 9:28 AM

## 2021-09-07 NOTE — PROGRESS NOTES
[facility] Progress Note    Admitting Date and Time: 8/30/2021  2:25 PM  Admit Dx: Pneumonia due to COVID-19 virus [U07.1, J12.82]    Subjective:  Patient is being followed for Pneumonia due to COVID-19 virus [U07.1, J12.82]   Pt started having worsening oxygenation and hypoxia on 15 L     ROS: denies fever, chills, cp, n/v, HA unless stated above.       insulin glargine  20 Units SubCUTAneous Nightly    insulin lispro  0-18 Units SubCUTAneous TID WC    insulin lispro  0-9 Units SubCUTAneous Nightly    dexamethasone  6 mg IntraVENous Q12H    nystatin  5 mL Oral 4x Daily    amLODIPine  5 mg Oral Daily    losartan  100 mg Oral Daily    And    hydroCHLOROthiazide  25 mg Oral Daily    guaiFENesin-dextromethorphan  5 mL Oral Q6H    albuterol sulfate HFA  2 puff Inhalation Q4H WA    levoFLOXacin  750 mg Oral Daily    sodium chloride flush  5-40 mL IntraVENous 2 times per day    enoxaparin  40 mg SubCUTAneous BID    Vitamin D  2,000 Units Oral Daily    zinc sulfate  50 mg Oral Daily    ascorbic acid  500 mg Oral BID    pravastatin  20 mg Oral Daily    levothyroxine  150 mcg Oral Daily    pantoprazole  40 mg Oral Every Other Day     sodium chloride flush, 5-40 mL, PRN  sodium chloride, 25 mL, PRN  ondansetron, 4 mg, Q8H PRN   Or  ondansetron, 4 mg, Q6H PRN  polyethylene glycol, 17 g, Daily PRN  acetaminophen, 650 mg, Q6H PRN   Or  acetaminophen, 650 mg, Q6H PRN  glucose, 15 g, PRN  dextrose, 12.5 g, PRN  glucagon (rDNA), 1 mg, PRN  dextrose, 100 mL/hr, PRN  sodium chloride, 30 mL, PRN         Objective:    BP (!) 140/76   Pulse 96   Temp 97.5 °F (36.4 °C) (Oral)   Resp 22   Ht 5' 8\" (1.727 m)   Wt 215 lb 4.8 oz (97.7 kg)   SpO2 90%   BMI 32.74 kg/m²     General Appearance: alert and oriented to person, place and time   Skin: warm and dry  Head: normocephalic and atraumatic  Eyes: pupils equal, round, and reactive to light, extraocular eye movements intact, conjunctivae normal  Neck: neck supple and non tender without mass   Pulmonary/Chest: decreased sounds bilaterally- no wheezes, normal air movement, no respiratory distress  Cardiovascular: normal rate, normal S1 and S2 and no carotid bruits  Abdomen: soft, non-tender, non-distended, normal bowel sounds, no masses or organomegaly  Extremities: no cyanosis, no clubbing and no edema  Neurologic: no cranial nerve deficit and speech normal        Recent Labs     09/05/21  0330 09/06/21  0455 09/06/21  2150    136 133   K 4.5 4.7 3.9    101 99   CO2 22 24 22   BUN 29* 29* 25*   CREATININE 1.0 1.0 1.0   GLUCOSE 346* 333* 375*   CALCIUM 9.8 9.8 9.4       No results for input(s): WBC, RBC, HGB, HCT, MCV, MCH, MCHC, RDW, PLT, MPV in the last 72 hours. CTA:  1.  No indication for acute pulmonary emboli.       2.  Bilateral infiltrates that can be related with acute infectious viral   pneumonia.  Please correlate clinically.       3.  Some fullness of the collecting system of both kidneys.  See above   comments and recommendations. Assessment:    Active Problems:    Pneumonia due to COVID-19 virus    Acute respiratory failure with hypoxia (HCC)    Elevated LFTs    Uncontrolled type 2 diabetes mellitus with hyperglycemia ([facility] [address]  Resolved Problems:    * No resolved hospital problems. *      Plan:  1. Acute hypoxic aspiratory failure oxygen saturation was 87% on room air, was on 15L still hypoxic, will start Optiflow with oxygen saturation 90%, due to COVID-19 pneumonia, treating underlying process and wean off oxygen. CTA chest was negative for PE  2.  COVID-19 pneumonia, start patient on Decadron and remdesivir, inflammatory markers improved, yet due to the worsening oxygenation I will repeat CRP and D-dimer. 3.  Superimposed bacterial pneumonia with elevated procalcitonin, patient was started on Levaquin day 7, appreciate pulmonary input, CTA was negative for PE  4.   Elevated transaminases, most likely due to Covid infection, improved continue monitoring. 5.  History of diabetes type 2, blood glucose is not controlled due to steroids usually on Trulicity, Metformin and glimepiride, those were placed on hold, continue sliding scale insulin,in creased lantus to 25 units, add lispro 7 units with meals  6. History of hypertension, blood pressure is acceptable, antihypertensive medication were not continued on admission for some reason, restarted amlodipine, losartan/hydrochlorthiazide. 7.  History of hypothyroidism, continue levothyroxine      NOTE: This report was transcribed using voice recognition software. Every effort was made to ensure accuracy; however, inadvertent computerized transcription errors may be present.   Electronically signed by Karolina Pitt MD on 9/7/2021 at 9:16 AM

## 2021-09-07 NOTE — CARE COORDINATION
COVID + 8/30. Increased O2 demand to 90% optiflow w/ 50 liters O2. LTAC facilities discussed-agreeable to [facility] -referral made- O2 will need to be decreased to 65% or less to be accepted. [facility] DME also following. From home w/  PTA. On iv Decadron, po abx.  Will follow Delvin Vazquez RN case manager

## 2021-09-07 NOTE — PROGRESS NOTES
Pulmonary Progress Note    Admit Date: 2021  Requesting Physician: Cooper Rodriguez MD    CC:  COVID-19 Pneumonia    Subjective: Sitting up in bed now on optiflow. 60L 100%, was down to 4L when I last saw 2 days ago. POX dropping and unable to breath . Still feels very winded     Medications:     sodium chloride      dextrose        insulin glargine  25 Units SubCUTAneous Nightly    insulin lispro  7 Units SubCUTAneous TID WC    insulin lispro  0-18 Units SubCUTAneous TID WC    insulin lispro  0-9 Units SubCUTAneous Nightly    dexamethasone  6 mg IntraVENous Q12H    nystatin  5 mL Oral 4x Daily    amLODIPine  5 mg Oral Daily    losartan  100 mg Oral Daily    And    hydroCHLOROthiazide  25 mg Oral Daily    guaiFENesin-dextromethorphan  5 mL Oral Q6H    albuterol sulfate HFA  2 puff Inhalation Q4H WA    sodium chloride flush  5-40 mL IntraVENous 2 times per day    enoxaparin  40 mg SubCUTAneous BID    Vitamin D  2,000 Units Oral Daily    zinc sulfate  50 mg Oral Daily    ascorbic acid  500 mg Oral BID    pravastatin  20 mg Oral Daily    levothyroxine  150 mcg Oral Daily    pantoprazole  40 mg Oral Every Other Day         Vitals:    VITALS:  /80   Pulse 100   Temp 98 °F (36.7 °C) (Oral)   Resp 20   Ht 5' 8\" (1.727 m)   Wt 215 lb 4.8 oz (97.7 kg)   SpO2 93%   BMI 32.74 kg/m²   24HR INTAKE/OUTPUT:      Intake/Output Summary (Last 24 hours) at 2021 1527  Last data filed at 2021 0217  Gross per 24 hour   Intake --   Output 300 ml   Net -300 ml     CURRENT PULSE OXIMETRY:  SpO2: 93 %  24HR PULSE OXIMETRY RANGE:  SpO2  Av %  Min: 90 %  Max: 93 %      EXAM:  General: No distress. ENT:MMM  Neck: Trachea midline. Resp: No accessory muscle use. Breath sounds fairly clear, no wheezing/rales/rhonchi  CV: Regular rate. Regular rhythm. No mumur or rub. ABD: Non-tender. Non-distended. Skin: Warm and dry. No rash on exposed extremities.   Ext: BLE appear equal in size · Continue decadron 6mg IV BID  · Completed  remdesivir (day 5 of 5)  · DVT prophylaxis - on daily lovenox , d-dimer low 238    · Follow labs and inflammatory markers repeat LD  · On Vitamin D, C, and Zinc  · Change Robitussin and albuterol to straight to aid cough  · Procalcitonin elevated so added Levaquin on 9/1/21 (total 7 days) 0.30--->.25--->.25--->.15  · On nystatin  · Encourage prone position, activity as tolerated   · Isolation  · Condition is guarded, if any further decline in respiratory status may need ICU level of care   · IS         Electronically signed by DARON Rivas on 9/7/2021 at 3:27 PM     Attending Attestation Note:    Patient seen and examined with NP. I agree with above.     In addition, the following apply:    - optiflow 60L, 100% FiO2 to continue at this time  - remainder as above  - if condition worsens then patient will need to transfer to ICU level of care    Lizzie Zamarripa MD  9/7/2021  5:23 PM

## 2021-09-08 NOTE — PROGRESS NOTES
Pulmonary Progress Note    Admit Date: 2021  Requesting Physician: Enoc Barrera MD    CC:  COVID-19 Pneumonia    Subjective: Sitting up in bed remains  on optiflow. 60L 100%, POX 85%. Feels tired. coughing jags     Medications:     sodium chloride      dextrose        insulin glargine  25 Units SubCUTAneous Nightly    insulin lispro  7 Units SubCUTAneous TID WC    insulin lispro  0-18 Units SubCUTAneous TID WC    insulin lispro  0-9 Units SubCUTAneous Nightly    dexamethasone  6 mg IntraVENous Q12H    nystatin  5 mL Oral 4x Daily    amLODIPine  5 mg Oral Daily    losartan  100 mg Oral Daily    And    hydroCHLOROthiazide  25 mg Oral Daily    guaiFENesin-dextromethorphan  5 mL Oral Q6H    albuterol sulfate HFA  2 puff Inhalation Q4H WA    sodium chloride flush  5-40 mL IntraVENous 2 times per day    enoxaparin  40 mg SubCUTAneous BID    Vitamin D  2,000 Units Oral Daily    zinc sulfate  50 mg Oral Daily    ascorbic acid  500 mg Oral BID    pravastatin  20 mg Oral Daily    levothyroxine  150 mcg Oral Daily    pantoprazole  40 mg Oral Every Other Day         Vitals:    VITALS:  /68   Pulse 84   Temp 98.7 °F (37.1 °C) (Oral)   Resp 20   Ht 5' 8\" (1.727 m)   Wt 215 lb 4.8 oz (97.7 kg)   SpO2 (!) 85%   BMI 32.74 kg/m²   24HR INTAKE/OUTPUT:      Intake/Output Summary (Last 24 hours) at 2021 0932  Last data filed at 2021 1945  Gross per 24 hour   Intake 10 ml   Output 400 ml   Net -390 ml     CURRENT PULSE OXIMETRY:  SpO2: (!) 85 %  24HR PULSE OXIMETRY RANGE:  SpO2  Av.1 %  Min: 82 %  Max: 93 %      EXAM:  General: No distress. ENT:MMM  Neck: Trachea midline. Resp: No accessory muscle use. Breath sounds decreased, no wheezing/rales/rhonchi  CV: Regular rate. Regular rhythm. No mumur or rub. ABD: Non-tender. Non-distended. Skin: Warm and dry. No rash on exposed extremities. Ext: BLE appear equal in size today, trace edema b/l. Neuro: Awake.  Follows commands. ox3. pleasant      Lab Results:  CBC:   Recent Labs     09/07/21 1956   WBC 15.4*   HGB 13.2   HCT 38.1   MCV 89.9        BMP:   Recent Labs     09/06/21 0455 09/06/21 2150 09/08/21 0426    133 129*   K 4.7 3.9 4.5    99 100   CO2 24 22 21*   BUN 29* 25* 27*   CREATININE 1.0 1.0 0.9     LFT:   Recent Labs     09/06/21 0455 09/06/21 2150 09/08/21 0426   ALKPHOS 77 70 63   ALT 23 21 22   AST 24 24 31   PROT 6.1* 5.9* 6.1*   BILITOT 0.5 0.6 0.7   LABALBU 3.1* 3.2* 2.8*     PT/INR: No results for input(s): PROTIME, INR in the last 72 hours. Cultures:  Results for Devorah Liao (MRN [id number]) as of 9/4/2021 11:23   Ref. Range 8/30/2021 08:25   SARS-CoV-2, NAAT Latest Ref Range: Not Detected  DETECTED (A)       ABG:   No results for input(s): PH, PO2, PCO2, HCO3, BE, O2SAT in the last 72 hours. Films:  XR CHEST PORTABLE  9/8:  9/8 9/2    Result Date: 8/30/2021  EXAMINATION: ONE X-RAY VIEW OF THE CHEST 8/30/2021 7:32 am COMPARISON: 12/05/2018 HISTORY: ORDERING SYSTEM PROVIDED HISTORY: shortness of breath TECHNOLOGIST PROVIDED HISTORY: Reason for exam:->shortness of breath FINDINGS: The cardiac silhouette is within normal limits. There is a patchy infiltrate seen within the right perihilar region and right lower lobe and within the left lower lobe. There is no right or left pleural effusion. Patchy multifocal bilateral airspace disease. 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 was  down to 4L slowly increased upward to 15LNRB. Now on optiflow 60L 100%.   Titrate as able keep POX >88% only sating 85% some increased work of breathing with increased cough that takes her breath away    · Awaiting abg--unable to obtain   · cxr reviewed not much change in infiltrates   · Increase  decadron 6mg IV Q6H  · Completed  remdesivir   · DVT prophylaxis - on daily lovenox , d-dimer low 238  , repeat this am   · Follow labs and inflammatory markers repeat LD, crp  · On Vitamin D, C, and Zinc  · Change Robitussin and albuterol to straight to aid cough  · Procalcitonin elevated so added Levaquin on 9/1/21 (total 7 days) 0.30--->.25--->.25--->.15  · On nystatin  · Encourage prone position, activity as tolerated   · Isolation  · Condition is guarded, if any further decline in respiratory status may need ICU level of care   · IS         Electronically signed by DARON Urena on 9/8/2021 at 9:32 AM

## 2021-09-08 NOTE — PROGRESS NOTES
Spoke with pt daughter-in-law Ryan  over the phone, she is the pt primary contact and will funnel information to family members. Updated on pt status and plan of care.

## 2021-09-08 NOTE — PROGRESS NOTES
Spo2 87-88% on Optiflow 60L, Fio2 100%. Pt resting in bed, NRB mask noted on as well. Dr. Reyna Isaacs made aware, instructed to notify Dr. Didi Stewart. Dr. Zabrina Fajardo covering, T.O. order received, Stat ABG's, PCXR, and notify Dr. Didi Stewart with results. Orders entered.

## 2021-09-08 NOTE — PROGRESS NOTES
Updated pts  on his wifes status, addressed all his concerns and questions. He requested his daughter in law Thomas Wiley receive information as well.   Joycelyn Gonzales, [place]

## 2021-09-09 NOTE — PROGRESS NOTES
[facility] Progress Note    Admitting Date and Time: 8/30/2021  2:25 PM  Admit Dx: Pneumonia due to COVID-19 virus [U07.1, J12.82]    Subjective:  Patient is being followed for Pneumonia due to COVID-19 virus [U07.1, J12.82]   Pt remains on optiflow at 60L at 100%. Her pulse ox ranging from 85-92%. She remains tachypneic. She complains of cough. Pulmonology is following. Sed rate and CRP are elevated. There was a significant increase in CRP from 1.0 on 9/6, now at 6.2 yesterday. Per RN: consideration for transfer out was discussed at ICU rounds, however patient is right on the tipping point of intubation and has had worsening inflammatory markers. No significant improvement in her breathing or O2 sats. ROS: denies fever, chills, cp, sob, n/v, HA unless stated above.       dexamethasone  6 mg IntraVENous Q6H    insulin glargine  25 Units SubCUTAneous Nightly    insulin lispro  7 Units SubCUTAneous TID WC    insulin lispro  0-18 Units SubCUTAneous TID WC    insulin lispro  0-9 Units SubCUTAneous Nightly    nystatin  5 mL Oral 4x Daily    amLODIPine  5 mg Oral Daily    losartan  100 mg Oral Daily    And    hydroCHLOROthiazide  25 mg Oral Daily    guaiFENesin-dextromethorphan  5 mL Oral Q6H    albuterol sulfate HFA  2 puff Inhalation Q4H WA    sodium chloride flush  5-40 mL IntraVENous 2 times per day    enoxaparin  40 mg SubCUTAneous BID    Vitamin D  2,000 Units Oral Daily    zinc sulfate  50 mg Oral Daily    ascorbic acid  500 mg Oral BID    pravastatin  20 mg Oral Daily    levothyroxine  150 mcg Oral Daily    pantoprazole  40 mg Oral Every Other Day     sodium chloride flush, 5-40 mL, PRN  sodium chloride, 25 mL, PRN  ondansetron, 4 mg, Q8H PRN   Or  ondansetron, 4 mg, Q6H PRN  polyethylene glycol, 17 g, Daily PRN  acetaminophen, 650 mg, Q6H PRN   Or  acetaminophen, 650 mg, Q6H PRN  glucose, 15 g, PRN  dextrose, 12.5 g, PRN  glucagon (rDNA), 1 mg, PRN  dextrose, 100 mL/hr, PRN  sodium chloride, 30 mL, PRN         Objective:    BP (!) 109/56   Pulse 60   Temp 99.5 °F (37.5 °C) (Bladder)   Resp 22   Ht 5' 8\" (1.727 m)   Wt 215 lb 4.8 oz (97.7 kg)   SpO2 92%   BMI 32.74 kg/m²   Due to the patient's COVID-19-positive status, to reduce exposure, contamination, and in an effort to conserve PPE, this patient was evaluated through a combination of review of the medical record, nursing assessments, other physicians' exams and assessments, as well as telemedicine interaction. Recent Labs     09/06/21  2150 09/08/21  0426    129*   K 3.9 4.5   CL 99 100   CO2 22 21*   BUN 25* 27*   CREATININE 1.0 0.9   GLUCOSE 375* 195*   CALCIUM 9.4 9.8       Recent Labs     09/07/21 1956   WBC 15.4*   RBC 4.24   HGB 13.2   HCT 38.1   MCV 89.9   MCH 31.1   MCHC 34.6*   RDW 12.5      MPV 10.2       Radiology:  Narrative   EXAMINATION:   ONE XRAY VIEW OF THE CHEST       9/8/2021 8:13 am       COMPARISON:   09/02/2021       HISTORY:   ORDERING SYSTEM PROVIDED HISTORY: Desaturation   TECHNOLOGIST PROVIDED HISTORY:   Reason for exam:->Desaturation   Reason for exam:->Portable       FINDINGS:   The cardiomediastinal silhouette is stable.  Multifocal patchy bilateral   pulmonary airspace opacities.  No sizable effusion or pneumothorax.  No acute   osseous abnormality.           Impression   Stable bilateral pulmonary airspace opacities.           Assessment:    Active Problems:    Pneumonia due to COVID-19 virus    Acute respiratory failure with hypoxia (HCC)    Elevated LFTs    Uncontrolled type 2 diabetes mellitus with hyperglycemia (HCC)  Resolved Problems:    * No resolved hospital problems. *      Plan:  1.   Acute hypoxic respiratory failure, secondary to COVID-19 pneumonia with superimposed bacterial pneumonia  -Currently in ICU with O2 saturation of 89% on 60 L/min at 100 FiO2-wean as tolerated  -CT of the chest was negative for PE  -Continue to follow patient closely  -Robitussin for cough    2. COVID-19 pneumonia  -Status post remdesivir  -Continue IV steroids  -Continue to monitor inflammatory markers    3. Secondary superimposed bacterial pneumonia with elevated procalcitonin  -Patient was treated with a full course of Levaquin. 4.  Elevated transaminases-likely secondary to acute Covid infection    5. Type 2 diabetes with hyperglycemia-hemoglobin A1c was 7.4 on 8/31/2021  -Blood sugars have remained in the 300s, likely secondary to Decadron use  -Continue to monitor and continue insulin sliding scale and Lantus    6. Essential hypertension, controlled  -Continue Norvasc 5 mg daily, losartan 100 mg daily and hydrochlorothiazide 25 mg daily  -Continue to monitor      NOTE: This report was transcribed using voice recognition software. Every effort was made to ensure accuracy; however, inadvertent computerized transcription errors may be present.   Electronically signed by Lois Craig MD on 9/9/2021 at 7:47 AM

## 2021-09-09 NOTE — PLAN OF CARE
Problem: Airway Clearance - Ineffective  Goal: Achieve or maintain patent airway  Outcome: Met This Shift     Problem: Gas Exchange - Impaired  Goal: Absence of hypoxia  Outcome: Met This Shift  Goal: Promote optimal lung function  Outcome: Met This Shift     Problem: Breathing Pattern - Ineffective  Goal: Ability to achieve and maintain a regular respiratory rate  Outcome: Not Met This Shift     Problem:  Body Temperature -  Risk of, Imbalanced  Goal: Ability to maintain a body temperature within defined limits  Outcome: Met This Shift  Goal: Will regain or maintain usual level of consciousness  Outcome: Met This Shift  Goal: Complications related to the disease process, condition or treatment will be avoided or minimized  Outcome: Met This Shift     Problem: Isolation Precautions - Risk of Spread of Infection  Goal: Prevent transmission of infection  Outcome: Met This Shift     Problem: Nutrition Deficits  Goal: Optimize nutritional status  Outcome: Met This Shift     Problem: Risk for Fluid Volume Deficit  Goal: Maintain normal heart rhythm  Outcome: Met This Shift  Goal: Maintain absence of muscle cramping  Outcome: Met This Shift  Goal: Maintain normal serum potassium, sodium, calcium, phosphorus, and pH  Outcome: Met This Shift     Problem: Fatigue  Goal: Verbalize increase energy and improved vitality  Outcome: Met This Shift     Problem: Patient Education: Go to Patient Education Activity  Goal: Patient/Family Education  Outcome: Met This Shift     Problem: Pain:  Goal: Pain level will decrease  Description: Pain level will decrease  Outcome: Met This Shift  Goal: Control of acute pain  Description: Control of acute pain  Outcome: Met This Shift  Goal: Control of chronic pain  Description: Control of chronic pain  Outcome: Met This Shift     Problem: Pain:  Goal: Pain level will decrease  Description: Pain level will decrease  Outcome: Met This Shift  Goal: Control of acute pain  Description: Control of acute pain  Outcome: Met This Shift  Goal: Control of chronic pain  Description: Control of chronic pain  Outcome: Met This Shift

## 2021-09-09 NOTE — CARE COORDINATION
Social Work / Discharge Planning : COVID POSITIVE: Transfer to ICU from [facility]. Patient at [address] at 100 02 flow 60. [facility] referral made  by KAYLEE. Patient O2 will need to be decreased to 65% or less to be accepted at [facility]. .AWait plan. SW to follow.  Electronically signed by JANY Pelayo on 9/9/21 at 12:54 PM EDT

## 2021-09-09 NOTE — PATIENT CARE CONFERENCE
..  Intensive Care Daily Quality Rounding Checklist      ICU Team Members: Dr. David Velazquez, clinical pharmacist, pharmacy students, bedside RN,     ICU Day #: NUMBER: 2    Intubation Date:  n/a    Ventilator Day #: n/a    Central Line Insertion Date:  n/s        Day #: n/a     Arterial Line Insertion Date:  n/a      Day #: n/a    Temporary Hemodialysis Catheter Insertion Date:  n/a      Day # n/a    DVT Prophylaxis: Lovenox    GI Prophylaxis: Protonix    Pinzon Catheter Insertion Date: September 8       Day #: 2      Continued need (if yes, reason documented and discussed with physician): yes, accurate I & O    Skin Issues/ Wounds and ordered treatment discussed on rounds: yes, SOS precautions    Goals/ Plans for the Day: CRP, self-prone as able, continue critical care management.

## 2021-09-09 NOTE — CONSULTS
Critical Care Team: Daily Progress Note         Date and time: 9/9/2021 9:36 AM  Patient's name:  Jennifer Mullins  Medical Record Number: [id number]  Patient's account/billing number: [de-identified]  Patient's YOB: 1950  Age: 66 y.o. Date of Admission: 8/30/2021  2:25 PM  Length of stay during current admission: 10  Primary Care Physician: Tania Castle MD  Previous Pulmonary: Dr. Melendrez Saver    Code Status: Full Code    PMH:    Past Medical History:   Diagnosis Date    [facility] ([facility]     Diabetes mellitus ([facility] [facility]     Hypertension     Hypothyroidism     Thyroid disease     Urinary incontinence        PSH:   Past Surgical History:   Procedure Laterality Date    THYROIDECTOMY      TUBAL LIGATION         Reason for ICU admission:   1. Hypoxia  2. COVID-19 Pneumonia     Subjective:     Ms. Rufina Sharma is a 67 y/o female with past medical history noted that presented to [facility] ICU level of care for hypoxia that persists now on 60L, 100% FiO2 due to COVID-19 viral pneumonia.     Current ventilation:     [] Ventilator  [] BIPAP/AVAPS  [x] Nasal Cannula [] Room Air      Secretions      Amount:  [] Small [] Moderate  _ Large  [x] None  Color:     - White - Colored  - Bloody    Sedation:  RAAS Score:  [] Propofol gtt  [] Fentanyl gtt  [] Ativan gtt   [x] No Sedation    Paralyzed?:  [x] No    [] Yes    Vasopressors:  [x] No    [] Yes    If yes -   [] Levophed       [] Dopamine     [] Vasopressin       [] Dobutamine  [] Phenylephrine         [] Epinephrine    Central line:     [x] No   [] Yes         If yes -       [] Right IJ     [] Left IJ [] Right Femoral [] Left Femoral                   [] Right Subclavian [] Left Subclavian     Pinzon catheter:   [x] No   [] Yes     Urine output:            [x] Good   [] Low              [] Anuric      Objective:     Vital signs:  /65   Pulse 81   Temp 99.1 °F (37.3 °C) (Bladder)   Resp 22   Ht 5' 8\" (1.727 m)   Wt 215 lb 4.8 oz (97.7 kg)   SpO2 (!) 89%   BMI 32.74 kg/m²   Tmax over 24 hours:  Temp (24hrs), Av.4 °F (37.4 °C), Min:98.9 °F (37.2 °C), Max:99.9 °F (37.7 °C)      Patient Vitals for the past 6 hrs:   BP Temp Temp src Pulse Resp SpO2   21 0825 -- -- -- 81 -- --   21 0800 101/65 99.1 °F (37.3 °C) Bladder 61 22 (!) 89 %   21 0600 (!) 109/56 -- -- 60 22 92 %   21 0500 (!) 112/58 -- -- 65 21 90 %   21 0400 (!) 108/59 -- -- 63 18 91 %         Intake/Output Summary (Last 24 hours) at 2021 0936  Last data filed at 2021 0825  Gross per 24 hour   Intake 120 ml   Output 550 ml   Net -430 ml     Wt Readings from Last 2 Encounters:   21 215 lb 4.8 oz (97.7 kg)   21 213 lb (96.6 kg)     Body mass index is 32.74 kg/m². Physical examination:    General: No distress. Eyes: PERRL. No sclera icterus. No conjunctival injection. ENT: No discharge. Pharynx clear. Neck: Trachea midline. Normal thyroid. Resp: No accessory muscle use. No rales. No wheezing. No rhonchi. CV: Regular rate. Regular rhythm. No murmur or rub. Abd: Non-tender. Non-distended. No masses. No organmegaly. Normal bowel sounds. Skin: Warm and dry. No nodules on exposed extremities. No rash on exposed extremities. Ext: No cyanosis, clubbing, edema  Lymph: No cervical LAD. No supraclavicular LAD. M/S: No cyanosis. No joint deformity. No clubbing. Neuro: Positive pupils/gag/corneals. Normal pain response.     Medications:    Scheduled Meds:   dexamethasone  6 mg IntraVENous Q6H    insulin glargine  25 Units SubCUTAneous Nightly    insulin lispro  7 Units SubCUTAneous TID WC    insulin lispro  0-18 Units SubCUTAneous TID WC    insulin lispro  0-9 Units SubCUTAneous Nightly    nystatin  5 mL Oral 4x Daily    amLODIPine  5 mg Oral Daily    losartan  100 mg Oral Daily    And    hydroCHLOROthiazide  25 mg Oral Daily    guaiFENesin-dextromethorphan  5 mL Oral Q6H    albuterol sulfate HFA  2 puff Inhalation Q4H WA    sodium chloride flush  5-40 mL IntraVENous 2 times per day    enoxaparin  40 mg SubCUTAneous BID    Vitamin D  2,000 Units Oral Daily    zinc sulfate  50 mg Oral Daily    ascorbic acid  500 mg Oral BID    pravastatin  20 mg Oral Daily    levothyroxine  150 mcg Oral Daily    pantoprazole  40 mg Oral Every Other Day     Continuous Infusions:   sodium chloride      dextrose       PRN Meds:   sodium chloride flush, 5-40 mL, PRN  sodium chloride, 25 mL, PRN  ondansetron, 4 mg, Q8H PRN   Or  ondansetron, 4 mg, Q6H PRN  polyethylene glycol, 17 g, Daily PRN  acetaminophen, 650 mg, Q6H PRN   Or  acetaminophen, 650 mg, Q6H PRN  glucose, 15 g, PRN  dextrose, 12.5 g, PRN  glucagon (rDNA), 1 mg, PRN  dextrose, 100 mL/hr, PRN  sodium chloride, 30 mL, PRN          Vent Settings (Comprehensive) (if applicable):  Vent Information  Skin Assessment: Clean, dry, & intact  Equipment Changed: Humidification (bag changed)  FiO2 : 100 %  SpO2: (!) 89 %  SpO2/FiO2 ratio: 92  Humidification Source: Heated wire  Humidification Temp: 37  Humidification Temp Measured: 37  Circuit Condensation: Drained  Additional Respiratory  Assessments  Pulse: 81  Resp: 22  SpO2: (!) 89 %  Humidification Source: Heated wire  Humidification Temp: 37  Circuit Condensation: Drained    ABGs:   Recent Labs     09/08/21  1154   PH 7.515*   PCO2 31.9*   PO2 45.4*   HCO3 25.2   BE 2.8   O2SAT 85.2*       Laboratory findings:    Complete Blood Count:   Recent Labs     09/07/21 1956   WBC 15.4*   HGB 13.2   HCT 38.1           Last 3 Blood Glucose:   Recent Labs     09/06/21 2150 09/08/21 0426 09/09/21  0630   GLUCOSE 375* 195* 347*        PT/INR:    Lab Results   Component Value Date    PROTIME 13.1 03/05/2016    INR 1.2 03/05/2016     PTT:    Lab Results   Component Value Date    APTT 32.5 03/05/2016       Comprehensive Metabolic Profile:   Recent Labs     09/06/21 2150 09/06/21 2150 09/08/21 0426 09/08/21  0426 09/09/21  0630     --  129*  --  134   K 3.9 --  4.5  --  4.6   CL 99  --  100  --  101   CO2 22  --  21*  --  26   BUN 25*  --  27*  --  31*   CREATININE 1.0  --  0.9  --  1.1*   GLUCOSE 375*  --  195*  --  347*   CALCIUM 9.4  --  9.8  --  9.0   PROT 5.9*   < > 6.1*   < > 5.4*   LABALBU 3.2*   < > 2.8*   < > 2.8*   BILITOT 0.6   < > 0.7   < > 0.5   ALKPHOS 70   < > 63   < > 65   AST 24   < > 31   < > 19   ALT 21   < > 22   < > 22    < > = values in this interval not displayed. Magnesium: No results found for: MG  Phosphorus: No results found for: PHOS  Ionized Calcium: No results found for: CAION     Urinalysis:     Troponin: No results for input(s): TROPONINI in the last 72 hours. Microbiology past 24h:    Blood cultures:                 [] None drawn      [] Negative             []  Positive (Details:  )  Urine Culture:                   [] None drawn      [] Negative             []  Positive (Details:  )  Sputum Culture:               [] None drawn       [] Negative             []  Positive (Details:  )   Endotracheal aspirate:     [] None drawn       [] Negative             []  Positive (Details:  )     Other Pertinent Labs and Pathology Results:   1. Radiology/Imaging:     XR CHEST PORTABLE   Final Result   Stable bilateral pulmonary airspace opacities. CTA PULMONARY W CONTRAST   Final Result   1. No indication for acute pulmonary emboli. 2.  Bilateral infiltrates that can be related with acute infectious viral   pneumonia. Please correlate clinically. 3.  Some fullness of the collecting system of both kidneys. See above   comments and recommendations. XR CHEST PORTABLE   Final Result   Bilateral hazy opacities are similar to the previous exam and are suspicious   for pneumonia. This appearance can be seen with COVID-19. US DUP LOWER EXTREMITIES BILATERAL VENOUS   Final Result   No evidence of DVT in either lower extremity.            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 Heparin Subcut  [] EPC Cuffs    Nutrition:  [] NPO [] Tube Feeding (Specify: ) [] TPN  [x] PO (Diet: ADULT DIET; Regular; 5 carb choices (75 gm/meal)  Adult Oral Nutrition Supplement; Low Calorie/High Protein Oral Supplement  Adult Oral Nutrition Supplement; Other Oral Supplement; GELATEIN 20)    Home medications reconciled: [] No  [x] Yes    Insulin drip:   [x] No   [] Yes    Family updated:    [x] No   [] Yes    Transfer Candidate?:   [] No   [x] Yes    - patient remais on optiflow 60L, 100% FiO2 at this time  - possible transfer to floors if bed needed  - IV steroids  - s/p remdesivir  - continue supportive care at this time    Thank you for allowing me to participate in the care of this patient. Care reviewed with nursing staff, medical and surgical specialty care, primary care and the patient's family as available. Restraints are ordered when the patient can do harm to him/herself by pulling out devices. Critical Care Time: > 35 minutes excluding procedures    Odilon Nicole M.D.     Odilon Nicole MD  9/9/2021  9:36 AM

## 2021-09-10 NOTE — PROGRESS NOTES
[facility] Progress Note    Admitting Date and Time: 8/30/2021  2:25 PM  Admit Dx: Pneumonia due to COVID-19 virus [U07.1, J12.82]    Subjective:  Patient is being followed for Pneumonia due to COVID-19 virus [U07.1, J12.82]   Pt is feeling better still on 60L with 100 FiO2, O2 sat low 90s%    ROS: denies fever, chills, cp, n/v, HA unless stated above.       insulin glargine  30 Units SubCUTAneous Nightly    dexamethasone  6 mg IntraVENous Q6H    insulin lispro  7 Units SubCUTAneous TID WC    insulin lispro  0-18 Units SubCUTAneous TID WC    insulin lispro  0-9 Units SubCUTAneous Nightly    nystatin  5 mL Oral 4x Daily    amLODIPine  5 mg Oral Daily    losartan  100 mg Oral Daily    And    hydroCHLOROthiazide  25 mg Oral Daily    guaiFENesin-dextromethorphan  5 mL Oral Q6H    albuterol sulfate HFA  2 puff Inhalation Q4H WA    sodium chloride flush  5-40 mL IntraVENous 2 times per day    enoxaparin  40 mg SubCUTAneous BID    Vitamin D  2,000 Units Oral Daily    zinc sulfate  50 mg Oral Daily    ascorbic acid  500 mg Oral BID    pravastatin  20 mg Oral Daily    levothyroxine  150 mcg Oral Daily    pantoprazole  40 mg Oral Every Other Day     sodium chloride flush, 5-40 mL, PRN  sodium chloride, 25 mL, PRN  ondansetron, 4 mg, Q8H PRN   Or  ondansetron, 4 mg, Q6H PRN  polyethylene glycol, 17 g, Daily PRN  acetaminophen, 650 mg, Q6H PRN   Or  acetaminophen, 650 mg, Q6H PRN  glucose, 15 g, PRN  dextrose, 12.5 g, PRN  glucagon (rDNA), 1 mg, PRN  dextrose, 100 mL/hr, PRN  sodium chloride, 30 mL, PRN         Objective:    /61   Pulse 57   Temp 98.4 °F (36.9 °C) (Bladder)   Resp 28   Ht 5' 8\" (1.727 m)   Wt 215 lb 4.8 oz (97.7 kg)   SpO2 92%   BMI 32.74 kg/m²     General Appearance: alert and oriented to person, place and time   Skin: warm and dry  Head: normocephalic and atraumatic  Eyes: pupils equal, round, and reactive to light, extraocular eye movements intact, conjunctivae normal  Neck: neck supple and non tender without mass   Pulmonary/Chest: decreased sounds bilaterally- no wheezes, normal air movement, no respiratory distress  Cardiovascular: normal rate, normal S1 and S2 and no carotid bruits  Abdomen: soft, non-tender, non-distended, normal bowel sounds, no masses or organomegaly  Extremities: no cyanosis, no clubbing and no edema  Neurologic: no cranial nerve deficit and speech normal        Recent Labs     09/08/21  0426 09/09/21  0630 09/10/21  0520   * 134 133   K 4.5 4.6 5.0    101 101   CO2 21* 26 26   BUN 27* 31* 35*   CREATININE 0.9 1.1* 1.1*   GLUCOSE 195* 347* 384*   CALCIUM 9.8 9.0 9.3       Recent Labs     09/07/21 1956   WBC 15.4*   RBC 4.24   HGB 13.2   HCT 38.1   MCV 89.9   MCH 31.1   MCHC 34.6*   RDW 12.5      MPV 10.2        CTA:  1.  No indication for acute pulmonary emboli.       2.  Bilateral infiltrates that can be related with acute infectious viral   pneumonia.  Please correlate clinically.       3.  Some fullness of the collecting system of both kidneys.  See above   comments and recommendations. Assessment:    Active Problems:    Pneumonia due to COVID-19 virus    Acute respiratory failure with hypoxia (HCC)    Elevated LFTs    Uncontrolled type 2 diabetes mellitus with hyperglycemia ([facility] [address]  Resolved Problems:    * No resolved hospital problems. *      Plan:  1. Acute hypoxic aspiratory failure oxygen saturation was 87% on room air on presentation,  on Optiflow 60L and 100% FiO2, low threshold for intubation, will discuss with pulm. CTA chest was negative for PE  2.  COVID-19 pneumonia, start patient on Decadron and remdesivir, inflammatory markers improved, yet due to the worsening oxygenation CRP and D-dimer trended up, consider Tocilizumab, low threshold for intubation  3.   Superimposed bacterial pneumonia with elevated procalcitonin, patient finished a course of levaquin , appreciate pulmonary input, CTA was negative for PE  4. Elevated transaminases, most likely due to Covid infection, improved continue monitoring. 5.  History of diabetes type 2, blood glucose is not controlled due to steroids usually on Trulicity, Metformin and glimepiride, those were placed on hold, continue sliding scale insulin, increase lantus to 40 units and lispro 12 units with meals  6. History of hypertension, blood pressure is acceptable, antihypertensive medication were not continued on admission for some reason, restarted amlodipine, losartan/hydrochlorthiazide. 7.  History of hypothyroidism, continue levothyroxine      NOTE: This report was transcribed using voice recognition software. Every effort was made to ensure accuracy; however, inadvertent computerized transcription errors may be present.   Electronically signed by Chanell Wilcox MD on 9/10/2021 at 9:47 AM

## 2021-09-10 NOTE — PROGRESS NOTES
Critical Care Team - Daily Progress Note      Date and time: 9/10/2021 1:50 PM  Patient's name:  Neymar Jacobs  Medical Record Number: [id number]  Patient's account/billing number: [de-identified]  Patient's YOB: 1950  Age: 66 y.o. Date of Admission: 2021  2:25 PM  Length of stay during current admission: 11      Primary Care Physician: Suzanne Baker MD  ICU Attending Physician: Dr. Denver Bruch Status: Full Code    Reason for ICU admission: Acute respiratory failure with hypoxia COVID-19      SUBJECTIVE:     OVERNIGHT EVENTS:       No acute events overnight for the patient      OBJECTIVE:     VITAL SIGNS:  /61   Pulse 57   Temp 98.4 °F (36.9 °C) (Bladder)   Resp 26   Ht 5' 8\" (1.727 m)   Wt 215 lb 4.8 oz (97.7 kg)   SpO2 (!) 89%   BMI 32.74 kg/m²   Tmax over 24 hours:  Temp (24hrs), Av °F (37.2 °C), Min:98.4 °F (36.9 °C), Max:99.5 °F (37.5 °C)      Patient Vitals for the past 6 hrs:   Resp SpO2   09/10/21 1342 26 (!) 89 %         Intake/Output Summary (Last 24 hours) at 9/10/2021 1350  Last data filed at 9/10/2021 0530  Gross per 24 hour   Intake 210 ml   Output 750 ml   Net -540 ml     Wt Readings from Last 2 Encounters:   21 215 lb 4.8 oz (97.7 kg)   21 213 lb (96.6 kg)     Body mass index is 32.74 kg/m².         PHYSICAL EXAMINATION:    General appearance -no acute distress at this time  Mental status - alert, oriented to person, place, and time  Eyes - pupils equal and reactive, extraocular eye movements intact  Chest - clear to auscultation, no wheezes, rales or rhonchi, symmetric air entry  Heart - normal rate, regular rhythm, normal S1, S2, no murmurs, rubs, clicks or gallops  Abdomen - soft, nontender, nondistended, no masses or organomegaly  Neurological - alert, oriented, normal speech, no focal findings or movement disorder noted}  Extremities - peripheral pulses normal, no pedal edema, no clubbing or cyanosis  Skin - normal coloration and turgor, no rashes, no suspicious skin lesions noted      Any additional physical findings:    MEDICATIONS:    Scheduled Meds:   insulin glargine  40 Units SubCUTAneous Nightly    insulin lispro  12 Units SubCUTAneous TID WC    dexamethasone  6 mg IntraVENous Q6H    insulin lispro  0-18 Units SubCUTAneous TID WC    insulin lispro  0-9 Units SubCUTAneous Nightly    nystatin  5 mL Oral 4x Daily    amLODIPine  5 mg Oral Daily    losartan  100 mg Oral Daily    And    hydroCHLOROthiazide  25 mg Oral Daily    guaiFENesin-dextromethorphan  5 mL Oral Q6H    albuterol sulfate HFA  2 puff Inhalation Q4H WA    sodium chloride flush  5-40 mL IntraVENous 2 times per day    enoxaparin  40 mg SubCUTAneous BID    Vitamin D  2,000 Units Oral Daily    zinc sulfate  50 mg Oral Daily    ascorbic acid  500 mg Oral BID    pravastatin  20 mg Oral Daily    levothyroxine  150 mcg Oral Daily    pantoprazole  40 mg Oral Every Other Day     Continuous Infusions:   dexmedetomidine (PRECEDEX) IV infusion 0.4 mcg/kg/hr (09/10/21 1321)    sodium chloride      dextrose       PRN Meds:   sodium chloride flush, 5-40 mL, PRN  sodium chloride, 25 mL, PRN  ondansetron, 4 mg, Q8H PRN   Or  ondansetron, 4 mg, Q6H PRN  polyethylene glycol, 17 g, Daily PRN  acetaminophen, 650 mg, Q6H PRN   Or  acetaminophen, 650 mg, Q6H PRN  glucose, 15 g, PRN  dextrose, 12.5 g, PRN  glucagon (rDNA), 1 mg, PRN  dextrose, 100 mL/hr, PRN  sodium chloride, 30 mL, PRN          VENT SETTINGS (Comprehensive) (if applicable):  Vent Information  Skin Assessment: Clean, dry, & intact  Equipment Changed: (S) Humidification  Vt Ordered: 500 mL  FiO2 : 100 %  SpO2: (!) 89 %  SpO2/FiO2 ratio: 89  Humidification Source: Heated wire  Humidification Temp: 31  Humidification Temp Measured: 31  Circuit Condensation: Drained  Mask Type: Full face mask  Mask Size: Medium  Additional Respiratory  Assessments  Pulse: 57  Resp: 26  SpO2: (!) 89 %  Humidification Source: Heated wire  Humidification Temp: 31  Circuit Condensation: Drained  Oral Care: Lip moisturizer applied    ABGs:     Laboratory findings:    Complete Blood Count:   Recent Labs     09/07/21 1956   WBC 15.4*   HGB 13.2   HCT 38.1           Last 3 Blood Glucose:   Recent Labs     09/08/21  0426 09/09/21  0630 09/10/21  0520   GLUCOSE 195* 347* 384*        PT/INR:    Lab Results   Component Value Date    PROTIME 13.1 03/05/2016    INR 1.2 03/05/2016     PTT:    Lab Results   Component Value Date    APTT 32.5 03/05/2016       Comprehensive Metabolic Profile:   Recent Labs     09/08/21  0426 09/08/21  0426 09/09/21  0630 09/09/21  0630 09/10/21  0520   *  --  134  --  133   K 4.5  --  4.6  --  5.0     --  101  --  101   CO2 21*  --  26  --  26   BUN 27*  --  31*  --  35*   CREATININE 0.9  --  1.1*  --  1.1*   GLUCOSE 195*  --  347*  --  384*   CALCIUM 9.8  --  9.0  --  9.3   PROT 6.1*   < > 5.4*   < > 5.4*   LABALBU 2.8*   < > 2.8*   < > 2.7*   BILITOT 0.7   < > 0.5   < > 0.4   ALKPHOS 63   < > 65   < > 71   AST 31   < > 19   < > 20   ALT 22   < > 22   < > 24    < > = values in this interval not displayed. Magnesium: No results found for: MG  Phosphorus: No results found for: PHOS  Ionized Calcium: No results found for: CAION     Urinalysis:     Troponin: No results for input(s): TROPONINI in the last 72 hours.     Microbiology:    Cultures during this admission:     Blood cultures:                 [] None drawn      [] Negative             []  Positive (Details:  )  Urine Culture:                   [] None drawn      [] Negative             []  Positive (Details:  )  Sputum Culture:               [] None drawn       [] Negative             []  Positive (Details:  )   Endotracheal aspirate:     [] None drawn       [] Negative             []  Positive (Details:  )     Other pertinent Labs:       Radiology/Imaging:     Chest Xray (9/10/2021):    ASSESSMENT:     Patient Active Problem List    Diagnosis Date Noted    Sepsis ([facility] 03/06/2016    E. coli septicemia ([facility] 03/06/2016    Acute cystitis 03/06/2016    Type 2 diabetes mellitus without complication ([facility] 88/43/6798    Hypertension 03/06/2016    Pneumonia due to COVID-19 virus 08/30/2021    Acute respiratory failure with hypoxia (HCC)     Elevated LFTs     Uncontrolled type 2 diabetes mellitus with hyperglycemia ([facility]     UTI due to extended-spectrum beta lactamase (ESBL) producing Escherichia coli 12/05/2018     SYSTEMS ASSESSMENT    Neuro   No acute neurological complaints  Anxiety  Precedex started  Continue to monitor    Respiratory   Acute hypoxic respiratory failure secondary to COVID-19  COVID-19 pneumonia  AVAPS 4 hours on 4 hours off  OptiFlow and nonrebreather mask when off AVAPS  Low threshold for intubation  Albuterol inhaler  Decadron 6 mg every 6 hours        Cardiovascular   History of hypertension  Amlodipine-5 mg  Losartan 100 mg  Pravastatin 20 mg orally    Gastrointestinal   Prophylaxis  Continue monitor    Renal   Function stable urine output stable  Continue monitor     Infectious Disease   COVID-19 pneumonia    Hematology/Oncology     History of diabetes mellitus  Hyperglycemia-Humalog 12 units 3 times daily, Lantus 40 units, high-dose sliding scale    Endocrine     Hypothyroidism-Synthroid 150 MCG    Social/Spiritual/DNR/Other     Full code  Vitamin protocol    The patient was seen and evaluated by myself and Dr. Boone Esquivel MD PGY-2  9/10/2021 1:50 PM      Attending Physician Attestation: Dr. Kirkpatrick Duty    Thank you very much for allowing me to see this patient in consultation and follow up. I personally saw, examined and provided care for the patient. Radiographs, labs and medication list were reviewed by me independently. I spoke with bedside nursing, respiratory therapists and consultants. Critical care services and times documented are independent of procedures and multidisciplinary rounds with Residents. Additionally comprehensive, multidisciplinary rounds were conducted with the MICU team. The case was discussed in detail and plans for care were established. Review of Residents documentation was conducted and revisions were made as appropriate. I agree with the the above documented information. Physical Examination:  Vitals:   Vitals:    09/10/21 1300 09/10/21 1342 09/10/21 1400 09/10/21 1430   BP: (!) 146/66  (!) 176/74 (!) 101/55   Pulse: 116  71 63   Resp: (!) 33 26 25    Temp:       TempSrc:       SpO2: (!) 84% (!) 89% (!) 87%    Weight:       Height:          General: No Acute Distress  HEENT: normocephalic, atruamatic  CVS: RRR, S1, S2, No S3 or S4  Respiratory: decreased breath sounds at lung bases, no focal wheezes noted  Extremities: no clubbing/cyanosis/edema  Neuro: oriented x 2    ASSESSMENT:  · Acute hypoxic respiratory failure secondary to COVID-19 pneumonia  · COVID19 pneumonia - procalcitonin 0.30-->0.25  · Elevated liver enzymes  · 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 Care reviewed with nursing staff, medical and surgical specialty care, primary care and the patient's family as available. Restraints are ordered when the patient can do harm to him/herself by pulling out devices. Critical Care Time: > 35 minutes excluding procedures    Agatha Mcmillan M.D.     Agatha Mcmillan MD  9/10/2021  3:01 PM

## 2021-09-10 NOTE — PATIENT CARE CONFERENCE
..  Intensive Care Daily Quality Rounding Checklist      ICU Team Members: bedside nurse, charge nurse, , (resident), clinical pharmacist    ICU Day #: NUMBER: 3    Intubation Date:  n/a    Ventilator Day #: n/a    Central Line Insertion Date:  n/a        Day #: n/a     Arterial Line Insertion Date:  n/a      Day #: n/a    Temporary Hemodialysis Catheter Insertion Date:  n/a      Day # n/a    DVT Prophylaxis: Lovenox    GI Prophylaxis: Protonix    Pinzon Catheter Insertion Date: September 8       Day #: 3        Continued need (if yes, reason documented and discussed with physician): yes, accurate I & O    Skin Issues/ Wounds and ordered treatment discussed on rounds: yes, SOS precautions    Goals/ Plans for the Day: wean oxygen as tolerated,labs

## 2021-09-10 NOTE — PLAN OF CARE
Problem: Airway Clearance - Ineffective  Goal: Achieve or maintain patent airway  Outcome: Met This Shift     Problem: Gas Exchange - Impaired  Goal: Absence of hypoxia  Outcome: Met This Shift  Goal: Promote optimal lung function  Outcome: Met This Shift     Problem: Breathing Pattern - Ineffective  Goal: Ability to achieve and maintain a regular respiratory rate  Outcome: Met This Shift     Problem:  Body Temperature -  Risk of, Imbalanced  Goal: Ability to maintain a body temperature within defined limits  Outcome: Met This Shift  Goal: Will regain or maintain usual level of consciousness  Outcome: Met This Shift  Goal: Complications related to the disease process, condition or treatment will be avoided or minimized  Outcome: Met This Shift     Problem: Isolation Precautions - Risk of Spread of Infection  Goal: Prevent transmission of infection  Outcome: Met This Shift     Problem: Risk for Fluid Volume Deficit  Goal: Maintain normal heart rhythm  Outcome: Met This Shift  Goal: Maintain absence of muscle cramping  Outcome: Met This Shift  Goal: Maintain normal serum potassium, sodium, calcium, phosphorus, and pH  Outcome: Met This Shift     Problem: Loneliness or Risk for Loneliness  Goal: Demonstrate positive use of time alone when socialization is not possible  Outcome: Met This Shift     Problem: Fatigue  Goal: Verbalize increase energy and improved vitality  Outcome: Not Met This Shift     Problem: Patient Education: Go to Patient Education Activity  Goal: Patient/Family Education  Outcome: Met This Shift     Problem: Pain:  Goal: Pain level will decrease  Description: Pain level will decrease  Outcome: Met This Shift  Goal: Control of acute pain  Description: Control of acute pain  Outcome: Met This Shift  Goal: Control of chronic pain  Description: Control of chronic pain  Outcome: Met This Shift

## 2021-09-10 NOTE — PROGRESS NOTES
Date: 9/9/2021    Time: 10:48 PM    Patient Placed On BIPAP/CPAP/ Non-Invasive Ventilation? Yes    If no must comment. Facial area red/color change? No           If YES are Blister/Lesion present? No   If yes must notify nursing staff  BIPAP/CPAP skin barrier?   Yes    Skin barrier type:mepilexlite       Comments:  12/6 80%      [place] RCP

## 2021-09-11 NOTE — PROGRESS NOTES
[facility] Progress Note    Admitting Date and Time: 8/30/2021  2:25 PM  Admit Dx: Pneumonia due to COVID-19 virus [U07.1, J12.82]    Subjective:  Patient is being followed for Pneumonia due to COVID-19 virus [U07.1, J12.82]   patient was intubated earlier this morning. ROS: denies fever, chills, cp, n/v, HA unless stated above.       rocuronium  50 mg IntraVENous Once    propofol  100 mg IntraVENous Once    chlorhexidine  15 mL Mouth/Throat BID    insulin lispro  15 Units SubCUTAneous TID WC    insulin glargine  40 Units SubCUTAneous Nightly    dexamethasone  6 mg IntraVENous Q6H    insulin lispro  0-18 Units SubCUTAneous TID WC    insulin lispro  0-9 Units SubCUTAneous Nightly    nystatin  5 mL Oral 4x Daily    amLODIPine  5 mg Oral Daily    losartan  100 mg Oral Daily    And    hydroCHLOROthiazide  25 mg Oral Daily    guaiFENesin-dextromethorphan  5 mL Oral Q6H    albuterol sulfate HFA  2 puff Inhalation Q4H WA    sodium chloride flush  5-40 mL IntraVENous 2 times per day    enoxaparin  40 mg SubCUTAneous BID    Vitamin D  2,000 Units Oral Daily    zinc sulfate  50 mg Oral Daily    ascorbic acid  500 mg Oral BID    pravastatin  20 mg Oral Daily    levothyroxine  150 mcg Oral Daily    pantoprazole  40 mg Oral Every Other Day     sodium chloride flush, 5-40 mL, PRN  sodium chloride, 25 mL, PRN  ondansetron, 4 mg, Q8H PRN   Or  ondansetron, 4 mg, Q6H PRN  polyethylene glycol, 17 g, Daily PRN  acetaminophen, 650 mg, Q6H PRN   Or  acetaminophen, 650 mg, Q6H PRN  glucose, 15 g, PRN  dextrose, 12.5 g, PRN  glucagon (rDNA), 1 mg, PRN  dextrose, 100 mL/hr, PRN  sodium chloride, 30 mL, PRN         Objective:    BP (!) 96/57   Pulse 63   Temp 96.6 °F (35.9 °C) (Bladder)   Resp (!) 35   Ht 5' 8\" (1.727 m)   Wt 215 lb 4.8 oz (97.7 kg)   SpO2 (!) 89%   BMI 32.74 kg/m²     General Appearance: intubated and sedated   Skin: warm and dry  Head: normocephalic and atraumatic  Eyes: pupils equal, round, conjunctivae normal  Neck: neck supple and non tender without mass   Pulmonary/Chest: decreased sounds bilaterally- no wheezes, normal air movement, no respiratory distress  Cardiovascular: normal rate, normal S1 and S2 and no carotid bruits  Abdomen: soft, non-distended, normal bowel sounds  Extremities: no cyanosis, no clubbing and no edema  Neurologic: intubated and sedated        Recent Labs     09/09/21  0630 09/10/21  0520 09/11/21  0626    133 138   K 4.6 5.0 4.6    101 105   CO2 26 26 25   BUN 31* 35* 35*   CREATININE 1.1* 1.1* 0.9   GLUCOSE 347* 384* 172*   CALCIUM 9.0 9.3 9.3       Recent Labs     09/11/21  0626   WBC 14.8*   RBC 3.88   HGB 11.8   HCT 35.7   MCV 92.0   MCH 30.4   MCHC 33.1   RDW 12.1      MPV 10.3        CTA:  1.  No indication for acute pulmonary emboli.       2.  Bilateral infiltrates that can be related with acute infectious viral   pneumonia.  Please correlate clinically.       3.  Some fullness of the collecting system of both kidneys.  See above   comments and recommendations. Assessment:    Active Problems:    Pneumonia due to COVID-19 virus    Acute respiratory failure with hypoxia (HCC)    Elevated LFTs    Uncontrolled type 2 diabetes mellitus with hyperglycemia ([facility] [address]  Resolved Problems:    * No resolved hospital problems. *      Plan:  1. Acute hypoxic aspiratory failure oxygen saturation was 87% on room air on presentation, intubated earlier this morning,  CTA chest was negative for PE  2.  COVID-19 pneumonia, start patient on Decadron and remdesivir, inflammatory markers improved, yet due to the worsening oxygenation CRP and D-dimer trended up, consider Tocilizumab, low threshold for intubation  3. Superimposed bacterial pneumonia with elevated procalcitonin, patient finished a course of levaquin , appreciate pulmonary input, CTA was negative for PE  4.   Elevated transaminases, most likely due to Covid infection, improved

## 2021-09-11 NOTE — ANESTHESIA PROCEDURE NOTES
Airway  Date/Time: 9/11/2021 3:40 AM  Urgency: emergent    Airway not difficult    General Information and Staff    Patient location during procedure: ICU  Anesthesiologist: Kelly Banda MD  Performed: anesthesiologist     Consent for Airway (if performed for an anesthetic, see related documentation for consents)  Patient identity confirmed: per hospital policy  Consent: No emergent situation. Verbal consent obtained. Written consent not obtained.   Risks and benefits: risks, benefits and alternatives were discussed  Consent given by: patient      Code status verified:yes  Indications and Patient Condition  Indications for airway management: hypoxemia and respiratory distress  Spontaneous ventilation: present  Sedation level: deep  Preoxygenated: yes  Patient position: sniffing  MILS maintained throughout  Mask difficulty assessment: not attempted    Final Airway Details  Final airway type: endotracheal airway      Successful airway: ETT  Cuffed: yes   Successful intubation technique: video laryngoscopy  Facilitating devices/methods: intubating stylet  Endotracheal tube insertion site: oral  Blade: Mark  Blade size: #3  ETT size (mm): 8.0  Cormack-Lehane Classification: grade I - full view of glottis  Placement verified by: chest auscultation, capnometry and radiography   Placement verification comments: (CXR ordered and pending)  Measured from: lips  Number of attempts at approach: 1    Additional Comments  Propofol 100 mg and rocuronium 50 mg IV given for sedation for block

## 2021-09-11 NOTE — CONSULTS
[address] [place] [address] [facility]  [facility]  Consultation Note     Admit Date: 8/30/2021  2:25 PM    Reason for Consult:   Severe COVID-19    Attending Physician:  Pamela Moreno MD    HISTORY OF PRESENT ILLNESS:             The history is obtained from extensive review of available past medical records. The patient is a 66 y.o. female who is known to the ID service. The patient is not vaccinated against SARS-CoV-2. The patient presents to the ED at [facility] on 8/30/2021 with cough and fatigue. Symptoms started 1 week prior to the presentation with nasal congestion and rhinorrhea. The patient had just finished a course of Z-Ramírez. White count was low normal.  Lymphocyte count was low. CRP was 12 and came down to 7. Transaminases were slightly elevated. She was seen by pulmonary. She was treated with Remdesivir. Tocilizumab was not given. It is not known if Baricitinib was actually a consideration. She was treated with Levofloxacin. She was treated with dexamethasone. Dose was increased after a few days. In spite of this, oxygen requirements continued to increase. She was transferred to the ICU on 9/9/2021. The patient was intubated. ID was asked to see her in consultation. Past Medical History:        Diagnosis Date    Cancer ([facility] [address]     Diabetes mellitus ([facility] [address]     Hypertension     Hypothyroidism     Thyroid disease     Urinary incontinence      June 2020. Seen in the office with a recurrent UTI. She was on topical estrogen by urology and had been treated with Macrobid on multiple occasions. Biggest complaint was frequency on urination. She was treated with 3 doses of Fosfomycin. December 2018. Admitted. [facility] with fevers and chills. Urine culture grew E. coli and she was treated with Levofloxacin. Seen by Dr. Adenike Valdes for UTI. She was treated with Ceftriaxone. She improved and was discharged on oral Ciprofloxacin.   Seen in follow-up in the office in January 2019.  She was doing well. Past Surgical History:        Procedure Laterality Date    THYROIDECTOMY      TUBAL LIGATION       Current Medications:   Scheduled Meds:   rocuronium  50 mg IntraVENous Once    propofol  100 mg IntraVENous Once    chlorhexidine  15 mL Mouth/Throat BID    insulin lispro  0-18 Units SubCUTAneous Q6H    insulin glargine  40 Units SubCUTAneous Nightly    dexamethasone  6 mg IntraVENous Q6H    nystatin  5 mL Oral 4x Daily    amLODIPine  5 mg Oral Daily    losartan  100 mg Oral Daily    And    hydroCHLOROthiazide  25 mg Oral Daily    guaiFENesin-dextromethorphan  5 mL Oral Q6H    albuterol sulfate HFA  2 puff Inhalation Q4H WA    sodium chloride flush  5-40 mL IntraVENous 2 times per day    enoxaparin  40 mg SubCUTAneous BID    Vitamin D  2,000 Units Oral Daily    zinc sulfate  50 mg Oral Daily    ascorbic acid  500 mg Oral BID    pravastatin  20 mg Oral Daily    levothyroxine  150 mcg Oral Daily    pantoprazole  40 mg Oral Every Other Day     Continuous Infusions:   propofol 30 mcg/kg/min (09/11/21 1006)    fentaNYL 500 mcg in sodium chloride 0.9% 100 ml infusion 100 mcg/hr (09/11/21 0800)    cisatracurium (NIMBEX) infusion      dexmedetomidine (PRECEDEX) IV infusion 0.4 mcg/kg/hr (09/10/21 2336)    sodium chloride      dextrose       PRN Meds:sodium chloride flush, sodium chloride, ondansetron **OR** ondansetron, polyethylene glycol, acetaminophen **OR** acetaminophen, glucose, dextrose, glucagon (rDNA), dextrose, sodium chloride    Allergies:  Patient has no known allergies.     Social History:   Social History     Socioeconomic History    Marital status:      Spouse name: None    Number of children: None    Years of education: None    Highest education level: None   Occupational History    None   Tobacco Use    Smoking status: Never Smoker    Smokeless tobacco: Never Used   Vaping Use    Vaping Use: Never used   Substance and Sexual Activity    Alcohol use: No    Drug use: No    Sexual activity: Yes   Other Topics Concern    None   Social History Narrative    None     Social Determinants of Health     Financial Resource Strain:     Difficulty of Paying Living Expenses:    Food Insecurity:     Worried About Running Out of Food in the Last Year:     [address] in the Last Year:    Transportation Needs:     Lack of Transportation (Medical):  Lack of Transportation (Non-Medical):    Physical Activity:     Days of Exercise per Week:     Minutes of Exercise per Session:    Stress:     Feeling of Stress :    Social Connections:     Frequency of Communication with Friends and Family:     Frequency of Social Gatherings with Friends and Family:     Attends Holiness Services:     Active Member of Clubs or Organizations:     Attends Club or Organization Meetings:     Marital Status:    Intimate Partner Violence:     Fear of Current or Ex-Partner:     Emotionally Abused:     Physically Abused:     Sexually Abused:       Family History:       Problem Relation Age of Onset    Diabetes Mother     Hypertension Mother     Stroke Mother     Diabetes Father     Hypertension Father     Thyroid Disease Daughter     Down Syndrome Daughter     Diabetes Maternal Grandmother     Hypertension Maternal Grandmother    . Otherwise non-pertinent to the chief complaint. REVIEW OF SYSTEMS:    A 10 point review of system could not be obtained from the patient given her status of intubation and sedation. PHYSICAL EXAM:    Vitals:   BP (!) 90/58   Pulse (!) 48   Temp 97.3 °F (36.3 °C) (Bladder)   Resp 21   Ht 5' 8\" (1.727 m)   Wt 215 lb 4.8 oz (97.7 kg)   SpO2 90%   BMI 32.74 kg/m²   Constitutional: The patient is lying in bed in the ICU. She is intubated and sedated. FiO2 100%. PEEP 12. Skin: Warm and dry. No rashes were noted. HEENT: Eyes show round, and reactive pupils. No jaundice.  Moist mucous membranes, no ulcerations, no thrush. Neck: Supple to movements. No lymphadenopathy. Chest: No use of accessory muscles to breathe. Symmetrical expansion. Auscultation reveals no wheezing, crackles, or rhonchi. Cardiovascular: S1 and S2 are rhythmic and regular. No murmurs appreciated. Abdomen: Positive bowel sounds to auscultation. Benign to palpation. No masses felt. No hepatosplenomegaly. Extremities: No clubbing, no cyanosis, no edema. Lines: Left IJ TLC 9/11/2021. Right radial arterial line 9/11/2021. Pinzon catheter.     CBC+dif:  Recent Labs     09/11/21  0626   WBC 14.8*   HGB 11.8   HCT 35.7   MCV 92.0      NEUTROABS 12.99*     Lab Results   Component Value Date    CRP 4.0 (H) 09/09/2021    CRP 6.2 (H) 09/08/2021    CRP 1.0 (H) 09/06/2021      No results found for: CRPHS  No results found for: SEDRATE  Lab Results   Component Value Date    ALT 24 09/10/2021    AST 20 09/10/2021    ALKPHOS 71 09/10/2021    BILITOT 0.4 09/10/2021     Lab Results   Component Value Date     09/11/2021    K 4.6 09/11/2021    K 4.4 08/31/2021     09/11/2021    CO2 25 09/11/2021    BUN 35 09/11/2021    CREATININE 0.9 09/11/2021    GFRAA >60 09/11/2021    LABGLOM >60 09/11/2021    GLUCOSE 172 09/11/2021    PROT 5.4 09/10/2021    LABALBU 2.7 09/10/2021    CALCIUM 9.3 09/11/2021    BILITOT 0.4 09/10/2021    ALKPHOS 71 09/10/2021    AST 20 09/10/2021    ALT 24 09/10/2021       Lab Results   Component Value Date    PROTIME 13.1 03/05/2016    INR 1.2 03/05/2016       Lab Results   Component Value Date    TSH 1.220 03/04/2016       Lab Results   Component Value Date    COLORU BLOODY 02/25/2020    PHUR 6.5 02/25/2020    WBCUA 10-20 02/25/2020    RBCUA PACKED 02/25/2020    BACTERIA FEW 02/25/2020    CLARITYU TURBID 02/25/2020    SPECGRAV >=1.030 02/25/2020    LEUKOCYTESUR SMALL 02/25/2020    UROBILINOGEN 1.0 02/25/2020    BILIRUBINUR MODERATE 02/25/2020    BLOODU LARGE 02/25/2020    GLUCOSEU Negative 02/25/2020       Lab Results   Component Value Date    HCO3 22.6 09/11/2021    BE 0.4 09/11/2021    O2SAT 91.3 09/11/2021    PH 7.504 09/11/2021    PCO2 29.4 09/11/2021    PO2 56.0 09/11/2021     Radiology:  CT on admission:      Chest x-ray:      Microbiology:  Pending  No results for input(s): BC in the last 72 hours. No results for input(s): ORG in the last 72 hours. No results for input(s): Ronita Washington in the last 72 hours. No results for input(s): STREPNEUMAGU in the last 72 hours. No results for input(s): LP1UAG in the last 72 hours. No results for input(s): ASO in the last 72 hours. No results for input(s): CULTRESP in the last 72 hours. No results for input(s): PROCAL in the last 72 hours. Assessment:  · SARS-CoV-2 infection with severe pneumonia. Treated with Remdesivir. She did not receive IL-6 inhibitors or John inhibitors  · Acute respiratory failure  · Leukocytosis, probably secondary to steroid  · Possible superimposed bacterial pneumonia. Treated with Levofloxacin    Plan:    · Continue supportive treatment  · Respiratory cultures  · Urine antigens  · Repeat CRP. If CRP is climbing she might benefit from Baricitinib  · Check cultures, baseline ESR, CRP  · Monitor labs  · Will follow with you    Thank you for having us see this patient in consultation.   Discussed with ICU team.    Talha Doyle MD  10:51 AM  9/11/2021

## 2021-09-11 NOTE — PATIENT CARE CONFERENCE
Intensive Care Daily Quality Rounding Checklist        ICU Team Members: bedside nurse, charge nurse, , (resident), clinical pharmacist     ICU Day #: NUMBER: 4     Intubation Date:  9/11     Ventilator Day #:  1     Central Line Insertion Date:  9/11                                                    Day #: 1      Arterial Line Insertion Date:  9/11                             Day #: 1     Temporary Hemodialysis Catheter Insertion Date:  n/a                             Day # n/a     DVT Prophylaxis: Lovenox    GI Prophylaxis: Protonix     Pinzon Catheter Insertion Date: September 8                                        Day #: 4                                Continued need (if yes, reason documented and discussed with physician): yes, accurate I & O     Skin Issues/ Wounds and ordered treatment discussed on rounds: yes, SOS precautions     Goals/ Plans for the Day: daily labs, abg/cxr, wean vent, consult ID, prone

## 2021-09-11 NOTE — CONSULTS
ENDOCRINOLOGY INITIAL CONSULTATION NOTE      Date of admission: 8/30/2021  Date of service: 9/10/2021  Admitting physician: Bel Pike MD   Primary Care Physician: Con Simmonds, MD  Consultant physician: Dewitte Alpers MD     Reason for the consultation:  Uncontrolled DM    History of Present Illness: The history was seen thru ICU window due to Sada Muñoz is a 66 y.o. old female with PMH DM type 2, thyroid cancer, postsurgical hypothyroidism, lung cancer s/p right upper lobe lung surgery and other  listed below admitted to [facility] on 8/30/2021 because of cough, fatigue. Associated body aches, endocrine service was consulted for diabetes management. The patient hasn't been feeling well for about a wk before admission. Pt is not vaccinated. She tested positive for COVID. There is no h/o fevers, chills. Nausea or vomiting     Prior to admission  The patient was diagnosed with type 2 DM long time ago. Prior to admission patient was on Trulicity 1.5 mg/wk,  Glimepiride 2 mg daily, Metformin 500 mg BID. Lab Results   Component Value Date    LABA1C 7.4 08/31/2021     Regarding thyroid cancer  Detail history not available. Pt currently on Levothyroxine 150 mcg daily       Inpatient diet:   Carb Restricted diet     Point of care glucose monitoring   (Independently reviewed)   Recent Labs     09/09/21  0803 09/09/21  1153 09/09/21  1711 09/09/21  2036 09/10/21  0849 09/10/21  1145 09/10/21  1652 09/10/21  2047   GLUMET 321* 338* 330* 372* 411* 391* 321* 229*       Past medical history:   Past Medical History:   Diagnosis Date    [facility]     Diabetes mellitus [facility]     Hypertension     Hypothyroidism     Thyroid disease     Urinary incontinence        Past surgical history:  Past Surgical History:   Procedure Laterality Date    THYROIDECTOMY      TUBAL LIGATION         Social history:   Tobacco:   reports that she has never smoked.  She has never used smokeless tobacco.  Alcohol:   reports no history of alcohol use. Drugs:   reports no history of drug use.     Family history:    Family History   Problem Relation Age of Onset    Diabetes Mother     Hypertension Mother     Stroke Mother     Diabetes Father     Hypertension Father     Thyroid Disease Daughter     Down Syndrome Daughter     Diabetes Maternal Grandmother     Hypertension Maternal Grandmother        Allergy and drug reactions:   No Known Allergies    Scheduled Meds:   rocuronium  50 mg IntraVENous Once    propofol  100 mg IntraVENous Once    chlorhexidine  15 mL Mouth/Throat BID    insulin glargine  40 Units SubCUTAneous Nightly    insulin lispro  12 Units SubCUTAneous TID WC    dexamethasone  6 mg IntraVENous Q6H    insulin lispro  0-18 Units SubCUTAneous TID WC    insulin lispro  0-9 Units SubCUTAneous Nightly    nystatin  5 mL Oral 4x Daily    amLODIPine  5 mg Oral Daily    losartan  100 mg Oral Daily    And    hydroCHLOROthiazide  25 mg Oral Daily    guaiFENesin-dextromethorphan  5 mL Oral Q6H    albuterol sulfate HFA  2 puff Inhalation Q4H WA    sodium chloride flush  5-40 mL IntraVENous 2 times per day    enoxaparin  40 mg SubCUTAneous BID    Vitamin D  2,000 Units Oral Daily    zinc sulfate  50 mg Oral Daily    ascorbic acid  500 mg Oral BID    pravastatin  20 mg Oral Daily    levothyroxine  150 mcg Oral Daily    pantoprazole  40 mg Oral Every Other Day     PRN Meds:   sodium chloride flush, 5-40 mL, PRN  sodium chloride, 25 mL, PRN  ondansetron, 4 mg, Q8H PRN   Or  ondansetron, 4 mg, Q6H PRN  polyethylene glycol, 17 g, Daily PRN  acetaminophen, 650 mg, Q6H PRN   Or  acetaminophen, 650 mg, Q6H PRN  glucose, 15 g, PRN  dextrose, 12.5 g, PRN  glucagon (rDNA), 1 mg, PRN  dextrose, 100 mL/hr, PRN  sodium chloride, 30 mL, PRN      Continuous Infusions:   propofol 10 mcg/kg/min (09/11/21 0426)    dexmedetomidine (PRECEDEX) IV infusion 0.4 mcg/kg/hr (09/10/21 6106)    sodium chloride      dextrose OBJECTIVE    BP (!) 96/57   Pulse 65   Temp 96.6 °F (35.9 °C) (Bladder)   Resp 27   Ht 5' 8\" (1.727 m)   Wt 215 lb 4.8 oz (97.7 kg)   SpO2 90%   BMI 32.74 kg/m²     Intake/Output Summary (Last 24 hours) at 9/11/2021 0725  Last data filed at 9/11/2021 7842  Gross per 24 hour   Intake 1289.55 ml   Output 1390 ml   Net -100.45 ml       Physical examination:  Pt was seen thru ICU window due to covid   Neck: no obvious neck mass. No obvious neck deformity     CVS/chest Chest is moving with respiration    Extremities:  no visible tremor  Skin: No visible rashes as seen from camera   Musculoskeletal: no visible deformity    Review of Laboratory Data:  I personally reviewed the following labs:   Recent Labs     09/11/21  0626   WBC 14.8*   RBC 3.88   HGB 11.8   HCT 35.7   MCV 92.0   MCH 30.4   MCHC 33.1   RDW 12.1      MPV 10.3     Recent Labs     09/09/21  0630 09/10/21  0520 09/11/21  0626    133 138   K 4.6 5.0 4.6    101 105   CO2 26 26 25   BUN 31* 35* 35*   CREATININE 1.1* 1.1* 0.9   GLUCOSE 347* 384* 172*   CALCIUM 9.0 9.3 9.3   PROT 5.4* 5.4*  --    LABALBU 2.8* 2.7*  --    BILITOT 0.5 0.4  --    ALKPHOS 65 71  --    AST 19 20  --    ALT 22 24  --      No results found for: BHYDRXBUT  Lab Results   Component Value Date    LABA1C 7.4 08/31/2021    LABA1C 6.6 11/19/2013    LABA1C 6.6 08/27/2013     Lab Results   Component Value Date/Time    TSH 1.220 03/04/2016 10:26 AM     Lab Results   Component Value Date    LABA1C 7.4 08/31/2021    GLUCOSE 172 09/11/2021     No results found for: TRIG, HDL, LDLCALC, CHOL    Blood culture   Lab Results   Component Value Date    BC 5 Days no growth 08/31/2021    BC 5 Days- no growth 02/25/2020       Radiology:  XR CHEST PORTABLE   Final Result   1. Pneumomediastinum. 2. Question of very small left apical pneumothorax. 3. Unchanged bilateral infiltrates. 4. Status post intubation with ET tube tip located 3.3 cm above gurinder.    Findings were sent to Radiology Results [address] at 5:13 a.m. on   09/11/2021 to be communicated to a licensed caregiver. XR CHEST ABDOMEN NG PLACEMENT   Final Result   Nasogastric tube terminates in stomach. XR CHEST PORTABLE   Final Result   Stable bilateral pulmonary airspace opacities. CTA PULMONARY W CONTRAST   Final Result   1. No indication for acute pulmonary emboli. 2.  Bilateral infiltrates that can be related with acute infectious viral   pneumonia. Please correlate clinically. 3.  Some fullness of the collecting system of both kidneys. See above   comments and recommendations. XR CHEST PORTABLE   Final Result   Bilateral hazy opacities are similar to the previous exam and are suspicious   for pneumonia. This appearance can be seen with COVID-19. US DUP LOWER EXTREMITIES BILATERAL VENOUS   Final Result   No evidence of DVT in either lower extremity. XR CHEST PORTABLE    (Results Pending)   XR CHEST PORTABLE    (Results Pending)       Medical Records/Labs/Images review:   I personally reviewed and summarized previous records   All labs and imaging were reviewed independently     [address], a 66 y.o.-old female seen today for inpatient diabetes management     Diabetes Mellitus type 2  · Currently worsened with steroids   · For now, will change diabetes regimen to:  · Lantus 40 Units nightly   · High dose sliding scale Q4hrs   · Continue glucose check with meals and at bedtime   · Will titrate insulin dose based on the blood glucose trend & insulin requirement    COVID Pneumonia   · Management per critical care team     Thyroid cancer, s/p total thyroidectomy   · Pathology report not available  · Currently on Levothyroxine 150 mcg daily  · No recent TFT. Pt currently critically sick and on high dose steroids and these will likely affect thyroid lab result.  Plan to check TFT once current condition improve     Thank you for allowing us to participate in the care of this patient. Please do not hesitate to contact us with any additional questions. Lex Stephens MD  Endocrinologist, [facility] Diabetes Care and Endocrinology   [address] [place] [id number]   Phone: [phone]  Fax: [phone]  --------------------------------------------  An electronic signature was used to authenticate this note.  Misty Alexandra MD on 9/11/2021 at 7:25 AM

## 2021-09-11 NOTE — PROCEDURES
PROCEDURE  9/11/21       Time: 0845    CENTRAL LINE INSERTION  Risks, benefits and alternatives (for applicable procedures below) described. Performed By: Fransisco Addison MD.    Indication: central venous monitoring and centrally administered medications. Informed consent: Consent unable to be obtained due to the emergent nature of this procedure. .  Procedure: After routine sterile preparation, no  local anesthesia not performed due to emergent nature of this procedure. A left 3-Lumen 7F Central Venous Catheter was placed by internal jugular vein approach and secured by standard fashion. Ultrasound Guidance:   used. Number of Attempts: 1  Post-procedure Findings: A post procedural chest x-ray  was ordered and showed good line position. Patient tolerated the procedure well. Arterial Line Placement Procedure Note                     Indication: arterial blood gases and mechanical ventilation    Consent: Unable to be obtained due to the emergent nature of this procedure. Pascual's Test: Radial and ulnar pulses pain on ultrasound    Procedure: The skin over the right radial artery was prepped with betadine and draped in a sterile fashion. Local anesthesia was not performed due to the emergent nature of this procedure. A 20 gauge angiocath was then inserted, over a needle, into the vessel. The transducer set was then attached and securely fastened to the skin with sutures. Waveforms on the monitor were observed and found to be adequate. The patient had good distal perfusion after the procedure. The site was then dressed in a sterile fashion. The patient tolerated the procedure well. Complications: None    Attending Physician Attestation: Dr. Sulaiman Sotelo    Thank you very much for allowing me to see this patient in consultation and follow up. I personally saw, examined and provided care for the patient. Radiographs, labs and medication list were reviewed by me independently.  I spoke with bedside nursing, respiratory therapists and consultants. Critical care services and times documented are independent of procedures and multidisciplinary rounds with Residents. Additionally comprehensive, multidisciplinary rounds were conducted with the MICU team. The case was discussed in detail and plans for care were established. Review of Residents documentation was conducted and revisions were made as appropriate. I agree with the the above documented information. I was personally present during the completion of procedure.     Ronnie De Luna  12:04 PM

## 2021-09-11 NOTE — PROGRESS NOTES
Critical Care Team - Daily Progress Note      Date and time: 2021 5:35 AM  Patient's name:  Svetlana Cole  Medical Record Number: [id number]  Patient's account/billing number: [de-identified]  Patient's YOB: 1950  Age: 66 y.o. Date of Admission: 2021  2:25 PM  Length of stay during current admission: 12      Primary Care Physician: Sierra Mendoza MD  ICU Attending Physician: Dr. Blaine Quiles Status: Full Code    Reason for ICU admission: Acute respiratory failure with hypoxia COVID-19      SUBJECTIVE:     OVERNIGHT EVENTS:       No acute events overnight for the patient    : Acute respiratory distress overnight on BiPAP and she required intubation. : Patient overnight had increasing respiratory distress, was intubated by anesthesia.   Patient sedation minimal and patient was awake, will increase sedation      OBJECTIVE:     VITAL SIGNS:  /70   Pulse 51   Temp 96.6 °F (35.9 °C) (Bladder)   Resp 23   Ht 5' 8\" (1.727 m)   Wt 215 lb 4.8 oz (97.7 kg)   SpO2 90%   BMI 32.74 kg/m²   Tmax over 24 hours:  Temp (24hrs), Av.8 °F (36.6 °C), Min:96.4 °F (35.8 °C), Max:99 °F (37.2 °C)      Patient Vitals for the past 6 hrs:   BP Temp Temp src Pulse Resp SpO2   21 0511 -- -- -- 51 -- --   21 0500 114/70 -- -- 55 23 90 %   21 0400 (!) 69/49 96.6 °F (35.9 °C) Bladder 60 23 (!) 82 %   21 0357 -- -- -- 60 24 (!) 79 %   21 0352 -- -- -- 59 25 (!) 78 %   21 0300 103/65 -- -- 51 27 (!) 80 %   21 0200 97/63 -- -- (!) 45 12 92 %   21 0100 109/68 -- -- (!) 46 19 92 %   21 0033 -- -- -- -- 21 93 %   21 0000 112/70 96.4 °F (35.8 °C) Bladder (!) 48 17 90 %         Intake/Output Summary (Last 24 hours) at 2021 0535  Last data filed at 2021 0400  Gross per 24 hour   Intake 1289.55 ml   Output 1305 ml   Net -15.45 ml     Wt Readings from Last 2 Encounters:   21 215 lb 4.8 oz (97.7 kg)   21 213 lb (96.6 kg)     Body mass index is 32.74 kg/m².         PHYSICAL EXAMINATION:    General appearance -intubated sedated at this time  Mental status - alert, oriented to person, place, and time  Eyes - pupils equal and reactive, extraocular eye movements intact  Chest - clear to auscultation, no wheezes, rales or rhonchi, symmetric air entry  Heart - normal rate, regular rhythm, normal S1, S2, no murmurs, rubs, clicks or gallops  Abdomen - soft, nontender, nondistended, no masses or organomegaly  Neurological -patient intubated sedated, pupils equal reactive to light  Extremities - peripheral pulses normal, no pedal edema, no clubbing or cyanosis  Skin - normal coloration and turgor, no rashes, no suspicious skin lesions noted      Any additional physical findings:    MEDICATIONS:    Scheduled Meds:   rocuronium  50 mg IntraVENous Once    propofol  100 mg IntraVENous Once    chlorhexidine  15 mL Mouth/Throat BID    insulin glargine  40 Units SubCUTAneous Nightly    insulin lispro  12 Units SubCUTAneous TID WC    dexamethasone  6 mg IntraVENous Q6H    insulin lispro  0-18 Units SubCUTAneous TID WC    insulin lispro  0-9 Units SubCUTAneous Nightly    nystatin  5 mL Oral 4x Daily    amLODIPine  5 mg Oral Daily    losartan  100 mg Oral Daily    And    hydroCHLOROthiazide  25 mg Oral Daily    guaiFENesin-dextromethorphan  5 mL Oral Q6H    albuterol sulfate HFA  2 puff Inhalation Q4H WA    sodium chloride flush  5-40 mL IntraVENous 2 times per day    enoxaparin  40 mg SubCUTAneous BID    Vitamin D  2,000 Units Oral Daily    zinc sulfate  50 mg Oral Daily    ascorbic acid  500 mg Oral BID    pravastatin  20 mg Oral Daily    levothyroxine  150 mcg Oral Daily    pantoprazole  40 mg Oral Every Other Day     Continuous Infusions:   propofol 10 mcg/kg/min (09/11/21 0426)    dexmedetomidine (PRECEDEX) IV infusion 0.4 mcg/kg/hr (09/10/21 3086)    sodium chloride      dextrose       PRN Meds:   sodium chloride flush, 5-40 mL, PRN  sodium chloride, 25 mL, PRN  ondansetron, 4 mg, Q8H PRN   Or  ondansetron, 4 mg, Q6H PRN  polyethylene glycol, 17 g, Daily PRN  acetaminophen, 650 mg, Q6H PRN   Or  acetaminophen, 650 mg, Q6H PRN  glucose, 15 g, PRN  dextrose, 12.5 g, PRN  glucagon (rDNA), 1 mg, PRN  dextrose, 100 mL/hr, PRN  sodium chloride, 30 mL, PRN          VENT SETTINGS (Comprehensive) (if applicable):  Vent Information  $Ventilation: $Subsequent Day  Skin Assessment: Clean, dry, & intact  Equipment Changed: (S) Humidification  Vent Mode: AC/VC  Vt Ordered: 500 mL  Rate Set: 24 bmp  Peak Flow: 60 L/min  Pressure Support: 0 cmH20  FiO2 : 100 %  SpO2: 90 %  SpO2/FiO2 ratio: 90  Sensitivity: 3  PEEP/CPAP: 12  I Time/ I Time %: 0 s  Humidification Source: Heated wire  Humidification Temp: 31  Humidification Temp Measured: 31  Circuit Condensation: Drained  Mask Type: Full face mask  Mask Size: Medium  Additional Respiratory  Assessments  Pulse: 51  Resp: 23  SpO2: 90 %  Humidification Source: Heated wire  Humidification Temp: 31  Circuit Condensation: Drained  Oral Care: Other (comment) (patient refused at this time)  Subglottic Suction Done?: Yes  Cuff Pressure (cm H2O): 29 cm H2O    ABGs:     Laboratory findings:    Complete Blood Count:   No results for input(s): WBC, HGB, HCT, PLT in the last 72 hours.      Last 3 Blood Glucose:   Recent Labs     09/09/21  0630 09/10/21  0520   GLUCOSE 347* 384*        PT/INR:    Lab Results   Component Value Date    PROTIME 13.1 03/05/2016    INR 1.2 03/05/2016     PTT:    Lab Results   Component Value Date    APTT 32.5 03/05/2016       Comprehensive Metabolic Profile:   Recent Labs     09/09/21  0630 09/09/21  0630 09/10/21  0520     --  133   K 4.6  --  5.0     --  101   CO2 26  --  26   BUN 31*  --  35*   CREATININE 1.1*  --  1.1*   GLUCOSE 347*  --  384*   CALCIUM 9.0  --  9.3   PROT 5.4*   < > 5.4*   LABALBU 2.8*   < > 2.7*   BILITOT 0.5   < > 0.4   ALKPHOS 65   < > 71   AST 19   < > 20 ALT 22   < > 24    < > = values in this interval not displayed. Magnesium: No results found for: MG  Phosphorus: No results found for: PHOS  Ionized Calcium: No results found for: CAION     Urinalysis:     Troponin: No results for input(s): TROPONINI in the last 72 hours.       ASSESSMENT:     Patient Active Problem List    Diagnosis Date Noted    Sepsis ([facility] 03/06/2016    E. coli septicemia ([facility] 03/06/2016    Acute cystitis 03/06/2016    Type 2 diabetes mellitus without complication ([facility] 01/86/2051    Hypertension 03/06/2016    Pneumonia due to COVID-19 virus 08/30/2021    Acute respiratory failure with hypoxia (HCC)     Elevated LFTs     Uncontrolled type 2 diabetes mellitus with hyperglycemia ([facility]     UTI due to extended-spectrum beta lactamase (ESBL) producing Escherichia coli 12/05/2018     SYSTEMS ASSESSMENT    Neuro     Debated sedated to be paralyzed  Anxiety  Propofol fentanyl for sedation  Paralytic to be started  Continue to monitor    Respiratory     Acute hypoxic respiratory failure secondary to COVID-19  COVID-19 pneumonia  AVAPS 4 hours on 4 hours off  OptiFlow and nonrebreather mask when off AVAPS  Low threshold for intubation  Albuterol inhaler  Decadron 6 mg every 6 hours        Cardiovascular     History of hypertension  Amlodipine-5 mg  Losartan 100 mg  Pravastatin 20 mg orally    Gastrointestinal   Prophylaxis  Continue monitor    Renal   Function stable urine output stable  Continue monitor     Infectious Disease   COVID-19 pneumonia  Infectious disease following  Trend inflammatory markers  Continue monitor  Hematology/Oncology     History of diabetes mellitus  Hyperglycemia-Humalog 12 units 3 times daily, Lantus 40 units, high-dose sliding scale    Endocrine     Hypothyroidism-Synthroid 150 MCG    Social/Spiritual/DNR/Other     Full code  Vitamin protocol    Plan switch to ARDS ventilation protocol, will prone patient as well, paralyzed, will get infectious disease consulted as well also trend inflammatory markers. The patient was seen and evaluated by myself and Dr. Eula Martinez MD PGY-2  9/11/2021 5:35 AM      Attending Physician Attestation: Dr. Odilon Nicole    Thank you very much for allowing me to see this patient in consultation and follow up. I personally saw, examined and provided care for the patient. Radiographs, labs and medication list were reviewed by me independently. I spoke with bedside nursing, respiratory therapists and consultants. Critical care services and times documented are independent of procedures and multidisciplinary rounds with Residents. Additionally comprehensive, multidisciplinary rounds were conducted with the MICU team. The case was discussed in detail and plans for care were established. Review of Residents documentation was conducted and revisions were made as appropriate. I agree with the the above documented information.      Physical Examination:  Vitals:   Vitals:    09/11/21 1200 09/11/21 1220 09/11/21 1300 09/11/21 1323   BP: 94/61      Pulse: (!) 47 (!) 47 (!) 48    Resp: 24  24 24   Temp: 97.9 °F (36.6 °C)      TempSrc: Bladder      SpO2: (!) 89%  93% (!) 88%   Weight:       Height:          General: No Acute Distress, sedated, intubated  HEENT: normocephalic, atruamatic  CVS: RRR, S1, S2, No S3 or S4  Respiratory: decreased breath sounds at lung bases, no focal wheezes noted  Abdomen: soft, NT, ND  Extremities: no clubbing/cyanosis/edema  Neuro: sedated, intubated    ASSESSMENT:  · Severe ARDS - in relation to COVID-19 viral pneumonia (s/p remdesivir treatment)  · Acute hypoxic respiratory failure secondary to COVID-19 pneumonia  · COVID19 pneumonia - procalcitonin 0.30-->0.25  · Elevated liver enzymes  · 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  · Uncontrolled T2DM  · Oral Candidiasis/thrush    In addition the following applies:    Check: serial inflammatory markers  Medication Alterations: N/A  Procedures: intubation, prone, central line, arterial line   Imaging: reviewed  New Consultations: ID  VENT: ACVC (DAY 2) 24/380/100/12  PRONE DAY 1 on 9/11/2021  Ppeak: 30  Pplateau: 30  Compliance: 30    Access: Left IJ TLC, Right Radial Arterial Line   Consults: ID< Pulmonary, Endocrinology   Drips: Propofol, Fentanyl, Nimbex  Nutrition: NPO  ABX: N/A  Completed ABX/Antivirals: Remdesivir     - if CRP climbs then consider baricitinib  - case reviewed with Dr. Siddharth Branch in ICU level of care  - PRONE with ABG after 30 minutes from prone maneuver  - vitamin cocktail with Vitamin C, vitamin D, zinc    Thank you for allowing me to participate in the care of this patient. Care reviewed with nursing staff, medical and surgical specialty care, primary care and the patient's family as available. Restraints are ordered when the patient can do harm to him/herself by pulling out devices. Critical Care Time: > 35 minutes excluding procedures    Yoli Mirza M.D.     Yoli Mirza MD  9/11/2021  2:26 PM

## 2021-09-12 NOTE — PATIENT CARE CONFERENCE
Intensive Care Daily Quality Rounding Checklist        ICU Team Members: Dr. Mitchell Rock, bedside nurse, charge nurse     ICU Day #: NUMBER: 5     Intubation Date:  9/11/21     Ventilator Day #:  2     Central Line Insertion Date:  9/11/21                                                    HPC #: 2      Arterial Line Insertion Date:  9/11/21                             Day #: 2     Temporary Hemodialysis Catheter Insertion Date:  n/a                             Day # n/a     DVT Prophylaxis: Lovenox    GI Prophylaxis: Protonix     Pinzon Catheter Insertion Date: September 8,2021                                        Day #: 5                                Continued need (if yes, reason documented and discussed with physician): yes, accurate I & O     Skin Issues/ Wounds and ordered treatment discussed on rounds: yes, SOS precautions     Goals/ Plans for the Day: daily labs, abg/cxr, wean vent, continue prone

## 2021-09-12 NOTE — PROGRESS NOTES
[facility] Progress Note    Admitting Date and Time: 8/30/2021  2:25 PM  Admit Dx: Pneumonia due to COVID-19 virus [U07.1, J12.82]    Subjective:  Patient is being followed for Pneumonia due to COVID-19 virus [U07.1, J12.82]   patient is intubated and pronated, O2 sats improved     ROS: denies fever, chills, cp, n/v, HA unless stated above.       rocuronium  50 mg IntraVENous Once    propofol  100 mg IntraVENous Once    chlorhexidine  15 mL Mouth/Throat BID    insulin lispro  0-18 Units SubCUTAneous Q6H    albuterol  2.5 mg Nebulization Q4H WA    insulin glargine  40 Units SubCUTAneous Nightly    dexamethasone  6 mg IntraVENous Q6H    amLODIPine  5 mg Oral Daily    losartan  100 mg Oral Daily    And    hydroCHLOROthiazide  25 mg Oral Daily    guaiFENesin-dextromethorphan  5 mL Oral Q6H    sodium chloride flush  5-40 mL IntraVENous 2 times per day    enoxaparin  40 mg SubCUTAneous BID    Vitamin D  2,000 Units Oral Daily    zinc sulfate  50 mg Oral Daily    ascorbic acid  500 mg Oral BID    pravastatin  20 mg Oral Daily    levothyroxine  150 mcg Oral Daily    pantoprazole  40 mg Oral Every Other Day     sodium chloride flush, 5-40 mL, PRN  sodium chloride, 25 mL, PRN  ondansetron, 4 mg, Q8H PRN   Or  ondansetron, 4 mg, Q6H PRN  polyethylene glycol, 17 g, Daily PRN  acetaminophen, 650 mg, Q6H PRN   Or  acetaminophen, 650 mg, Q6H PRN  glucose, 15 g, PRN  dextrose, 12.5 g, PRN  glucagon (rDNA), 1 mg, PRN  dextrose, 100 mL/hr, PRN  sodium chloride, 30 mL, PRN         Objective:    BP (!) 153/62   Pulse 73   Temp 98.1 °F (36.7 °C) (Bladder)   Resp 23   Ht 5' 8\" (1.727 m)   Wt 210 lb 1.6 oz (95.3 kg)   SpO2 92%   BMI 31.95 kg/m²     General Appearance: intubated and sedated   Skin: warm and dry  Head: normocephalic and atraumatic  Eyes: pupils equal, round, conjunctivae normal  Neck: neck supple and non tender without mass   Pulmonary/Chest: decreased sounds bilaterally- no wheezes, normal air movement, no respiratory distress  Cardiovascular: normal rate, normal S1 and S2 and no carotid bruits  Abdomen: soft, non-distended, normal bowel sounds  Extremities: no cyanosis, no clubbing and no edema  Neurologic: intubated and sedated        Recent Labs     09/10/21  0520 09/11/21  0626 09/12/21  0530    138 136   K 5.0 4.6 4.5    105 102   CO2 26 25 22   BUN 35* 35* 46*   CREATININE 1.1* 0.9 1.1*   GLUCOSE 384* 172* 414*   CALCIUM 9.3 9.3 9.2       Recent Labs     09/11/21  0626 09/12/21  0530   WBC 14.8* 24.2*   RBC 3.88 3.97   HGB 11.8 12.2   HCT 35.7 36.8   MCV 92.0 92.7   MCH 30.4 30.7   MCHC 33.1 33.2   RDW 12.1 12.5    331   MPV 10.3 10.3        CTA:  1.  No indication for acute pulmonary emboli.       2.  Bilateral infiltrates that can be related with acute infectious viral   pneumonia.  Please correlate clinically.       3.  Some fullness of the collecting system of both kidneys.  See above   comments and recommendations. Assessment:    Active Problems:    Pneumonia due to COVID-19 virus    Acute respiratory failure with hypoxia (HCC)    Elevated LFTs    Uncontrolled type 2 diabetes mellitus with hyperglycemia ([facility]  Resolved Problems:    * No resolved hospital problems. *      Plan:  1. Acute hypoxic aspiratory failure oxygen saturation was 87% on room air on presentation, intubated earlier this morning,  CTA chest was negative for PE  2.  COVID-19 pneumonia, start patient on Decadron and remdesivir, inflammatory markers improved, yet due to the worsening oxygenation CRP and D-dimer trended up, now intubated  3. Superimposed bacterial pneumonia with elevated procalcitonin, patient finished a course of levaquin , appreciate pulmonary input, CTA was negative for PE  4. Elevated transaminases, most likely due to Covid infection, improved continue monitoring.   5.  History of diabetes type 2, blood glucose is not controlled due to steroids usually on Trulicity, Metformin and glimepiride, those were placed on hold, continue sliding scale insulin, increase lantus to 40 units and lispro 12 units with meals, consulted endocrinology  6. History of hypertension, blood pressure is acceptable, antihypertensive medication were not continued on admission for some reason, restarted amlodipine, losartan/hydrochlorthiazide. 7.  History of hypothyroidism, continue levothyroxine      NOTE: This report was transcribed using voice recognition software. Every effort was made to ensure accuracy; however, inadvertent computerized transcription errors may be present.   Electronically signed by Isiah Dewey MD on 9/12/2021 at 8:26 AM

## 2021-09-12 NOTE — PROGRESS NOTES
[address] [place] [address] [facility]  MEHREEN  Progress Note    SUBJECTIVE:  No chief complaint on file. The patient is lying in bed in the ICU. She is pronated hand on the ventilator. She is sedated and paralyzed. No fever. Review of systems:  As stated above in the chief complaint, otherwise negative.     Medications:  Scheduled Meds:   pantoprazole  40 mg IntraVENous Every Other Day    And    sodium chloride (PF)  10 mL IntraVENous Every Other Day    insulin lispro  0-24 Units SubCUTAneous Q4H    rocuronium  50 mg IntraVENous Once    propofol  100 mg IntraVENous Once    chlorhexidine  15 mL Mouth/Throat BID    albuterol  2.5 mg Nebulization Q4H WA    insulin glargine  40 Units SubCUTAneous Nightly    dexamethasone  6 mg IntraVENous Q6H    amLODIPine  5 mg Oral Daily    losartan  100 mg Oral Daily    And    hydroCHLOROthiazide  25 mg Oral Daily    guaiFENesin-dextromethorphan  5 mL Oral Q6H    sodium chloride flush  5-40 mL IntraVENous 2 times per day    enoxaparin  40 mg SubCUTAneous BID    Vitamin D  2,000 Units Oral Daily    zinc sulfate  50 mg Oral Daily    ascorbic acid  500 mg Oral BID    pravastatin  20 mg Oral Daily    levothyroxine  150 mcg Oral Daily     Continuous Infusions:   propofol 40 mcg/kg/min (21 1018)    fentaNYL 500 mcg in sodium chloride 0.9% 100 ml infusion 175 mcg/hr (21 0636)    cisatracurium (NIMBEX) infusion 2 mcg/kg/min (21 2249)    dexmedetomidine (PRECEDEX) IV infusion 0.4 mcg/kg/hr (09/10/21 2336)    sodium chloride      dextrose       PRN Meds:sodium chloride flush, sodium chloride, ondansetron **OR** ondansetron, polyethylene glycol, acetaminophen **OR** acetaminophen, glucose, dextrose, glucagon (rDNA), dextrose, sodium chloride    OBJECTIVE:  BP (!) 153/62   Pulse 92   Temp 98.1 °F (36.7 °C) (Bladder)   Resp 24   Ht 5' 8\" (1.727 m)   Wt 210 lb 1.6 oz (95.3 kg)   SpO2 96%   BMI 31.95 kg/m²   Temp  Av.1 °F (36.7 °C)  Min: 97.9 °F (36.6 °C)  Max: 98.4 °F (36.9 °C)  Constitutional: The patient is lying in bed in the ICU. She is pronated, sedated and paralyzed. Exam is limited. FiO2 80%. PEEP 12. Skin: Warm and dry. No rashes were noted. HEENT: ET tube. NG tube. Neck: Not examined. Chest: No use of accessory muscles to breathe. Symmetrical expansion. No wheezing, crackles or rhonchi. Cardiovascular: S1 and S2 are rhythmic and regular. No murmurs appreciated. Abdomen: Not examined. Extremities: No edema. Lines: Right radial arterial line 9/11/2021. Left IJ TLC 9/11/2021. Pinzon catheter.     Laboratory and Tests Review:  Lab Results   Component Value Date    WBC 24.2 (H) 09/12/2021    WBC 14.8 (H) 09/11/2021    WBC 15.4 (H) 09/07/2021    HGB 12.2 09/12/2021    HCT 36.8 09/12/2021    MCV 92.7 09/12/2021     09/12/2021     Lab Results   Component Value Date    NEUTROABS 21.10 (H) 09/12/2021    NEUTROABS 12.99 (H) 09/11/2021    NEUTROABS 13.87 (H) 09/07/2021     No results found for: [facility]  Lab Results   Component Value Date    ALT 24 09/10/2021    AST 20 09/10/2021    ALKPHOS 71 09/10/2021    BILITOT 0.4 09/10/2021     Lab Results   Component Value Date     09/12/2021    K 4.5 09/12/2021    K 4.4 08/31/2021     09/12/2021    CO2 22 09/12/2021    BUN 46 09/12/2021    CREATININE 1.1 09/12/2021    CREATININE 0.9 09/11/2021    CREATININE 1.1 09/10/2021    GFRAA 59 09/12/2021    LABGLOM 49 09/12/2021    GLUCOSE 414 09/12/2021    PROT 5.4 09/10/2021    LABALBU 2.7 09/10/2021    CALCIUM 9.2 09/12/2021    BILITOT 0.4 09/10/2021    ALKPHOS 71 09/10/2021    AST 20 09/10/2021    ALT 24 09/10/2021     Lab Results   Component Value Date    CRP 0.8 (H) 09/11/2021    CRP 4.0 (H) 09/09/2021    CRP 6.2 (H) 09/08/2021     No results found for: [address]  Radiology:      Microbiology:   Nares screen MRSA: Negative  Blood cultures 8/31/2021: Negative  Urine antigens: Pending  Respiratory culture 9/11/2021: Pending    ASSESSMENT:  · SARS-CoV-2 infection with severe pneumonia. Completed course of Remdesivir. She did not receive either Tocilizumab or Baricitinib. CRP is down to 0.8  · Acute respiratory failure  · Leukocytosis.   She is on steroids  · Possible superimposed bacterial pneumonia, treated with Levofloxacin    PLAN:  · Continue supportive treatment  · Antibiotics are not warranted at this point  · We will follow closely with you     Rocky Swan MD  10:34 AM  9/12/2021

## 2021-09-12 NOTE — PROGRESS NOTES
Critical Care Team - Daily Progress Note      Date and time: 2021 10:35 AM  Patient's name:  Ramy Kaiser  Medical Record Number: [id number]  Patient's account/billing number: [de-identified]  Patient's YOB: 1950  Age: 66 y.o. Date of Admission: 2021  2:25 PM  Length of stay during current admission: 13      Primary Care Physician: Endy Rock MD  ICU Attending Physician: Dr. Jurgen Lindsey Status: Full Code    Reason for ICU admission: Acute respiratory failure with hypoxia COVID-19      SUBJECTIVE:     OVERNIGHT EVENTS:       No acute events overnight for the patient    9/10: Acute respiratory distress overnight on BiPAP and she required intubation. : Patient overnight had increasing respiratory distress, was intubated by anesthesia. Patient sedation minimal and patient was awake, will increase sedation    : patient remains prone, FiO2 to 80%      OBJECTIVE:     VITAL SIGNS:  BP (!) 153/62   Pulse 92   Temp 98.1 °F (36.7 °C) (Bladder)   Resp 24   Ht 5' 8\" (1.727 m)   Wt 210 lb 1.6 oz (95.3 kg)   SpO2 96%   BMI 31.95 kg/m²   Tmax over 24 hours:  Temp (24hrs), Av.1 °F (36.7 °C), Min:97.9 °F (36.6 °C), Max:98.4 °F (36.9 °C)      Patient Vitals for the past 6 hrs:   BP Temp Temp src Pulse Resp SpO2 Weight   21 0900 -- -- -- 92 24 96 % --   21 0833 -- -- -- 77 23 95 % --   21 0830 -- -- -- 71 24 95 % --   21 0800 -- 98.1 °F (36.7 °C) Bladder 73 24 93 % --   21 0700 -- -- -- 73 23 92 % --   21 0600 (!) 153/62 -- -- 88 24 96 % --   21 0500 (!) 124/53 -- -- 84 23 94 % 210 lb 1.6 oz (95.3 kg)         Intake/Output Summary (Last 24 hours) at 2021 1035  Last data filed at 2021 0900  Gross per 24 hour   Intake 2408 ml   Output 900 ml   Net 1508 ml     Wt Readings from Last 2 Encounters:   21 210 lb 1.6 oz (95.3 kg)   21 213 lb (96.6 kg)     Body mass index is 31.95 kg/m².         PHYSICAL EXAMINATION:    General appearance -intubated sedated at this time  Mental status - alert, oriented to person, place, and time  Eyes - pupils equal and reactive, extraocular eye movements intact  Chest - clear to auscultation, no wheezes, rales or rhonchi, symmetric air entry  Heart - normal rate, regular rhythm, normal S1, S2, no murmurs, rubs, clicks or gallops  Abdomen - soft, nontender, nondistended, no masses or organomegaly  Neurological -patient intubated sedated, pupils equal reactive to light  Extremities - peripheral pulses normal, no pedal edema, no clubbing or cyanosis  Skin - normal coloration and turgor, no rashes, no suspicious skin lesions noted   Neuro: sedated, proned, paralyzed     Any additional physical findings:    MEDICATIONS:    Scheduled Meds:   pantoprazole  40 mg IntraVENous Every Other Day    And    sodium chloride (PF)  10 mL IntraVENous Every Other Day    insulin lispro  0-24 Units SubCUTAneous Q4H    rocuronium  50 mg IntraVENous Once    propofol  100 mg IntraVENous Once    chlorhexidine  15 mL Mouth/Throat BID    albuterol  2.5 mg Nebulization Q4H WA    insulin glargine  40 Units SubCUTAneous Nightly    dexamethasone  6 mg IntraVENous Q6H    amLODIPine  5 mg Oral Daily    losartan  100 mg Oral Daily    And    hydroCHLOROthiazide  25 mg Oral Daily    guaiFENesin-dextromethorphan  5 mL Oral Q6H    sodium chloride flush  5-40 mL IntraVENous 2 times per day    enoxaparin  40 mg SubCUTAneous BID    Vitamin D  2,000 Units Oral Daily    zinc sulfate  50 mg Oral Daily    ascorbic acid  500 mg Oral BID    pravastatin  20 mg Oral Daily    levothyroxine  150 mcg Oral Daily     Continuous Infusions:   propofol 40 mcg/kg/min (09/12/21 1018)    fentaNYL 500 mcg in sodium chloride 0.9% 100 ml infusion 175 mcg/hr (09/12/21 0636)    cisatracurium (NIMBEX) infusion 2 mcg/kg/min (09/11/21 9329)    dexmedetomidine (PRECEDEX) IV infusion 0.4 mcg/kg/hr (09/10/21 2336)    sodium chloride      dextrose PRN Meds:   sodium chloride flush, 5-40 mL, PRN  sodium chloride, 25 mL, PRN  ondansetron, 4 mg, Q8H PRN   Or  ondansetron, 4 mg, Q6H PRN  polyethylene glycol, 17 g, Daily PRN  acetaminophen, 650 mg, Q6H PRN   Or  acetaminophen, 650 mg, Q6H PRN  glucose, 15 g, PRN  dextrose, 12.5 g, PRN  glucagon (rDNA), 1 mg, PRN  dextrose, 100 mL/hr, PRN  sodium chloride, 30 mL, PRN          VENT SETTINGS (Comprehensive) (if applicable):  Vent Information  $Ventilation: $Subsequent Day  Skin Assessment: Clean, dry, & intact  Equipment ID: [id number]  Equipment Changed: (S) Humidification  Vent Type: 980  Vent Mode: AC/VC  Vt Ordered: 380 mL  Rate Set: 24 bmp  Peak Flow: 60 L/min  Pressure Support: 0 cmH20  FiO2 : (S) 90 %  SpO2: 96 %  SpO2/FiO2 ratio: 105.56  Sensitivity: 3  PEEP/CPAP: 12  I Time/ I Time %: 0 s  Humidification Source: Heated wire  Humidification Temp: 37  Humidification Temp Measured: 36.7  Circuit Condensation: Drained  Mask Type: Full face mask  Mask Size: Medium  Additional Respiratory  Assessments  Pulse: 92  Resp: 24  SpO2: 96 %  Humidification Source: Heated wire  Humidification Temp: 37  Circuit Condensation: Drained  Oral Care: Teeth brushed, Mouth suctioned, Suction toothette  Subglottic Suction Done?: Yes  Cuff Pressure (cm H2O): 29 cm H2O    ABGs:     Laboratory findings:    Complete Blood Count:   Recent Labs     09/11/21 0626 09/12/21 0530   WBC 14.8* 24.2*   HGB 11.8 12.2   HCT 35.7 36.8    331        Last 3 Blood Glucose:   Recent Labs     09/10/21  0520 09/11/21  0626 09/12/21  0530   GLUCOSE 384* 172* 414*        PT/INR:    Lab Results   Component Value Date    PROTIME 13.1 03/05/2016    INR 1.2 03/05/2016     PTT:    Lab Results   Component Value Date    APTT 32.5 03/05/2016       Comprehensive Metabolic Profile:   Recent Labs     09/10/21  0520 09/11/21  0626 09/12/21  0530    138 136   K 5.0 4.6 4.5    105 102   CO2 26 25 22   BUN 35* 35* 46*   CREATININE 1.1* 0.9 1.1* GLUCOSE 384* 172* 414*   CALCIUM 9.3 9.3 9.2   PROT 5.4*  --   --    LABALBU 2.7*  --   --    BILITOT 0.4  --   --    ALKPHOS 71  --   --    AST 20  --   --    ALT 24  --   --       Magnesium:   Lab Results   Component Value Date    MG 2.3 09/12/2021     Phosphorus:   Lab Results   Component Value Date    PHOS 5.1 09/12/2021     Ionized Calcium: No results found for: CAION     Urinalysis:     Troponin: No results for input(s): TROPONINI in the last 72 hours.       ASSESSMENT:     Patient Active Problem List    Diagnosis Date Noted    Sepsis ([facility] 03/06/2016    E. coli septicemia ([facility] 03/06/2016    Acute cystitis 03/06/2016    Type 2 diabetes mellitus without complication ([facility] 50/56/9874    Hypertension 03/06/2016    Pneumonia due to COVID-19 virus 08/30/2021    Acute respiratory failure with hypoxia (HCC)     Elevated LFTs     Uncontrolled type 2 diabetes mellitus with hyperglycemia ([facility]     UTI due to extended-spectrum beta lactamase (ESBL) producing Escherichia coli 12/05/2018     SYSTEMS ASSESSMENT    Neuro     Debated sedated to be paralyzed  Anxiety  Propofol fentanyl for sedation  Paralytic to be started  Continue to monitor    Respiratory     Acute hypoxic respiratory failure secondary to COVID-19  COVID-19 pneumonia  AVAPS 4 hours on 4 hours off  OptiFlow and nonrebreather mask when off AVAPS  Low threshold for intubation  Albuterol inhaler  Decadron 6 mg every 6 hours        Cardiovascular     History of hypertension  Amlodipine-5 mg  Losartan 100 mg  Pravastatin 20 mg orally    Gastrointestinal   Prophylaxis  Continue monitor    Renal   Function stable urine output stable  Continue monitor     Infectious Disease   COVID-19 pneumonia  Infectious disease following  Trend inflammatory markers  Continue monitor  Hematology/Oncology     History of diabetes mellitus  Hyperglycemia-Humalog 12 units 3 times daily, Lantus 40 units, high-dose sliding scale    Endocrine     Hypothyroidism-Synthroid 150 MCG    Social/Spiritual/DNR/Other     Full code  Vitamin protocol    Plan switch to ARDS ventilation protocol, will prone patient as well, paralyzed, will get infectious disease consulted as well also trend inflammatory markers. The patient was seen and evaluated by myself and Dr. Mera Murphy MD PGY-2  9/12/2021 10:35 AM      Attending Physician Attestation: Dr. Melquiades Gilbert    Thank you very much for allowing me to see this patient in consultation and follow up. I personally saw, examined and provided care for the patient. Radiographs, labs and medication list were reviewed by me independently. I spoke with bedside nursing, respiratory therapists and consultants. Critical care services and times documented are independent of procedures and multidisciplinary rounds with Residents. Additionally comprehensive, multidisciplinary rounds were conducted with the MICU team. The case was discussed in detail and plans for care were established. Review of Residents documentation was conducted and revisions were made as appropriate. I agree with the the above documented information.      Physical Examination:  Vitals:   Vitals:    09/12/21 0800 09/12/21 0830 09/12/21 0833 09/12/21 0900   BP:       Pulse: 73 71 77 92   Resp: 24 24 23 24   Temp: 98.1 °F (36.7 °C)      TempSrc: Bladder      SpO2: 93% 95% 95% 96%   Weight:       Height:          General: No Acute Distress, sedated, intubated  HEENT: normocephalic, atruamatic  CVS: RRR, S1, S2, No S3 or S4  Respiratory: decreased breath sounds at lung bases, no focal wheezes noted  Abdomen: soft, NT, ND  Extremities: no clubbing/cyanosis/edema  Neuro: sedated, intubated, prone    ASSESSMENT:  · Severe ARDS - in relation to COVID-19 viral pneumonia (s/p remdesivir treatment)  · Acute hypoxic respiratory failure secondary to COVID-19 pneumonia  · COVID19 pneumonia - procalcitonin 0.30-->0.25  · Elevated liver enzymes  · 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  · Uncontrolled T2DM  · Oral Candidiasis/thrush    In addition the following applies:    Check: serial inflammatory markers  Medication Alterations: N/A  Procedures: intubation, prone, central line, arterial line   Imaging: reviewed  New Consultations: ID  VENT: ACVC (DAY 3) 24/380/80/12  PRONE DAY 2 (Day 1 9/11/2021)  Ppeak: 35  Pplateau: 29  Compliance: 24    Access: Left IJ TLC, Right Radial Arterial Line   Consults: ID, Pulmonary, Endocrinology   Drips: Propofol, Fentanyl, Nimbex  Nutrition: Tube Feeds  ABX: N/A  Completed ABX/Antivirals: Remdesivir     - CRP trending down, no role for baricitinib or accelerated antibiotics  - vitamin cocktail with Vitamin C, vitamin D, zinc    Thank you for allowing me to participate in the care of this patient. Care reviewed with nursing staff, medical and surgical specialty care, primary care and the patient's family as available. Restraints are ordered when the patient can do harm to him/herself by pulling out devices. Critical Care Time: > 35 minutes excluding procedures    Truong Bowling M.D.     Truong Bowling MD  9/12/2021  10:35 AM

## 2021-09-12 NOTE — PLAN OF CARE
Problem: Airway Clearance - Ineffective  Goal: Achieve or maintain patent airway  Outcome: Met This Shift     Problem: Gas Exchange - Impaired  Goal: Absence of hypoxia  Outcome: Met This Shift     Problem: Body Temperature -  Risk of, Imbalanced  Goal: Will regain or maintain usual level of consciousness  Outcome: Met This Shift     Problem: Nutrition Deficits  Goal: Optimize nutritional status  Outcome: Met This Shift  Note: Started on tube feedings.      Problem: Non-Violent Restraints  Goal: Removal from restraints as soon as assessed to be safe  9/11/2021 1234 by Drea Amato RN  Outcome: Completed  9/11/2021 1121 by Drea Amato RN  Outcome: Not Met This Shift  Goal: No harm/injury to patient while restraints in use  9/11/2021 1234 by Drea Amato RN  Outcome: Completed  9/11/2021 1121 by Drea Amato RN  Outcome: Met This Shift  Goal: Patient's dignity will be maintained  9/11/2021 1234 by Drea Amato RN  Outcome: Completed  9/11/2021 1121 by Drea Amato RN  Outcome: Met This Shift

## 2021-09-13 NOTE — PROGRESS NOTES
[facility] Progress Note    Admitting Date and Time: 8/30/2021  2:25 PM  Admit Dx: Pneumonia due to COVID-19 virus [U07.1, J12.82]    Subjective:  Patient is being followed for Pneumonia due to COVID-19 virus [U07.1, J12.82]     No acute events overnight. Afebrile throughout her stay. ROS: denies fever, chills, cp, sob, n/v, HA unless stated above.       pantoprazole  40 mg IntraVENous Every Other Day    And    sodium chloride (PF)  10 mL IntraVENous Every Other Day    insulin lispro  0-24 Units SubCUTAneous Q4H    rocuronium  50 mg IntraVENous Once    propofol  100 mg IntraVENous Once    chlorhexidine  15 mL Mouth/Throat BID    albuterol  2.5 mg Nebulization Q4H WA    insulin glargine  40 Units SubCUTAneous Nightly    dexamethasone  6 mg IntraVENous Q6H    amLODIPine  5 mg Oral Daily    losartan  100 mg Oral Daily    And    hydroCHLOROthiazide  25 mg Oral Daily    guaiFENesin-dextromethorphan  5 mL Oral Q6H    sodium chloride flush  5-40 mL IntraVENous 2 times per day    enoxaparin  40 mg SubCUTAneous BID    Vitamin D  2,000 Units Oral Daily    zinc sulfate  50 mg Oral Daily    ascorbic acid  500 mg Oral BID    pravastatin  20 mg Oral Daily    levothyroxine  150 mcg Oral Daily     sodium chloride flush, 5-40 mL, PRN  sodium chloride, 25 mL, PRN  ondansetron, 4 mg, Q8H PRN   Or  ondansetron, 4 mg, Q6H PRN  polyethylene glycol, 17 g, Daily PRN  acetaminophen, 650 mg, Q6H PRN   Or  acetaminophen, 650 mg, Q6H PRN  glucose, 15 g, PRN  dextrose, 12.5 g, PRN  glucagon (rDNA), 1 mg, PRN  dextrose, 100 mL/hr, PRN  sodium chloride, 30 mL, PRN         Objective:    BP (!) 147/55   Pulse 72   Temp 98.7 °F (37.1 °C) (Bladder)   Resp 24   Ht 5' 8\" (1.727 m)   Wt 212 lb 1.3 oz (96.2 kg)   SpO2 94%   BMI 32.25 kg/m²     General Appearance: alert and oriented to person, place and time and in no acute distress  Skin: warm and dry  Head: normocephalic and atraumatic  Eyes: pupils equal, round, and reactive to light, extraocular eye movements intact, conjunctivae normal  Neck: neck supple and non tender without mass   Pulmonary/Chest: Decreased breath sounds bilaterally  Cardiovascular: normal rate, normal S1 and S2 and no carotid bruits  Abdomen: soft, non-tender, non-distended, normal bowel sounds, no masses or organomegaly  Extremities: no cyanosis, no clubbing and no edema  Neurologic: no cranial nerve deficit and speech normal        Recent Labs     09/11/21 0626 09/12/21  0530 09/13/21  0600    136 134   K 4.6 4.5 4.8    102 103   CO2 25 22 23   BUN 35* 46* 52*   CREATININE 0.9 1.1* 1.1*   GLUCOSE 172* 414* 251*   CALCIUM 9.3 9.2 9.1       Recent Labs     09/11/21 0626 09/12/21 0530 09/13/21  0600   WBC 14.8* 24.2* 19.9*   RBC 3.88 3.97 3.70   HGB 11.8 12.2 11.4*   HCT 35.7 36.8 34.7   MCV 92.0 92.7 93.8   MCH 30.4 30.7 30.8   MCHC 33.1 33.2 32.9   RDW 12.1 12.5 12.6    331 271   MPV 10.3 10.3 10.5       Radiology: No new imaging studies    Assessment:    Active Problems:    Pneumonia due to COVID-19 virus    Acute respiratory failure with hypoxia (HCC)    Elevated LFTs    Uncontrolled type 2 diabetes mellitus with hyperglycemia ([facility] [address]  Resolved Problems:    * No resolved hospital problems. *      Plan:      1. Sepsis secondary to COVID-19 pneumonia with possible superimposed bacterial pneumonia -patient completed remdesivir. Continue Decadron 6 mg every 6 hours. Patient received Levaquin for 5 days. Procalcitonin trending down. MRSA screen negative. Follow-up with ID recommendations  2. Acute hypoxic respiratory failure secondary to COVID-19 pneumonia status post mechanical ventilation -patient is currently on fentanyl as well as propofol for sedation. 3.  Diabetes mellitus type 2, hyperglycemia -currently on Lantus 40 units daily as well as lispro every 4 hours, high-dose. Blood sugars have improved however still elevated.   Lantus was increased to 45 units this morning. We will continue to monitor blood sugars closely. Endocrinology was consulted yesterday. 4.  Hypertension -controlled with Norvasc, losartan, hydrochlorothiazide. 5.  Hypothyroidism -continue Synthroid  6. DVT prophylaxis -Lovenox 40 units twice daily. D-dimer mildly elevated    NOTE: This report was transcribed using voice recognition software. Every effort was made to ensure accuracy; however, inadvertent computerized transcription errors may be present.   Electronically signed by Rehan Lemus DO on 9/13/2021 at 8:07 AM

## 2021-09-13 NOTE — CARE COORDINATION
Continue ICU, COVID+. Pt intubated on 9/11 for increased respiratory distress. Currently on vent support, FiO2-80%. Remains sedated, paralytic, bp elevated.  Eraa following-mjo

## 2021-09-13 NOTE — PROGRESS NOTES
[address] [place] [address] [facility]  MEHREEN  Progress Note    SUBJECTIVE:  No chief complaint on file. The patient is lying in bed in the ICU. She is pronated and on the ventilator. She is sedated and paralyzed. No fever. Review of systems:  As stated above in the chief complaint, otherwise negative. Medications:  Scheduled Meds:   insulin glargine  45 Units SubCUTAneous Nightly    Refresh Lacri-Lube   Ophthalmic Q6H    pantoprazole  40 mg IntraVENous Every Other Day    And    sodium chloride (PF)  10 mL IntraVENous Every Other Day    insulin lispro  0-24 Units SubCUTAneous Q4H    chlorhexidine  15 mL Mouth/Throat BID    albuterol  2.5 mg Nebulization Q4H WA    dexamethasone  6 mg IntraVENous Q6H    amLODIPine  5 mg Oral Daily    losartan  100 mg Oral Daily    And    hydroCHLOROthiazide  25 mg Oral Daily    guaiFENesin-dextromethorphan  5 mL Oral Q6H    sodium chloride flush  5-40 mL IntraVENous 2 times per day    enoxaparin  40 mg SubCUTAneous BID    Vitamin D  2,000 Units Oral Daily    zinc sulfate  50 mg Oral Daily    ascorbic acid  500 mg Oral BID    pravastatin  20 mg Oral Daily    levothyroxine  150 mcg Oral Daily     Continuous Infusions:   fentaNYL 5 mcg/ml in 0.9%  ml infusion 175 mcg/hr (21 0817)    propofol 15 mcg/kg/min (21 0422)    cisatracurium (NIMBEX) infusion 2 mcg/kg/min (21 8479)    sodium chloride      dextrose       PRN Meds:sodium chloride flush, sodium chloride, ondansetron **OR** ondansetron, polyethylene glycol, acetaminophen **OR** acetaminophen, glucose, dextrose, glucagon (rDNA), dextrose, sodium chloride    OBJECTIVE:  BP (!) 147/55   Pulse 78   Temp 99 °F (37.2 °C) (Bladder)   Resp 24   Ht 5' 8\" (1.727 m)   Wt 212 lb 1.3 oz (96.2 kg)   SpO2 91%   BMI 32.25 kg/m²   Temp  Av.6 °F (37 °C)  Min: 98.2 °F (36.8 °C)  Max: 99 °F (37.2 °C)  Constitutional: The patient is lying in bed in the ICU.   She is pronated, sedated and paralyzed. Exam is limited. FiO2 80%. PEEP 12. No changes. Skin: Warm and dry. No rashes were noted. HEENT: ET tube. NG tube. Neck: Not examined. Chest: Good breath sounds bilaterally. No crackles. Cardiovascular: Heart sounds rhythmic and regular. Abdomen: Not examined. Extremities: No edema. Lines: Right radial arterial line 9/11/2021. Left IJ TLC 9/11/2021. Pinzon catheter. Laboratory and Tests Review:  Lab Results   Component Value Date    WBC 19.9 (H) 09/13/2021    WBC 24.2 (H) 09/12/2021    WBC 14.8 (H) 09/11/2021    HGB 11.4 (L) 09/13/2021    HCT 34.7 09/13/2021    MCV 93.8 09/13/2021     09/13/2021     Lab Results   Component Value Date    NEUTROABS 16.84 (H) 09/13/2021    NEUTROABS 21.10 (H) 09/12/2021    NEUTROABS 12.99 (H) 09/11/2021     No results found for: CRP  Lab Results   Component Value Date    ALT 24 09/10/2021    AST 20 09/10/2021    ALKPHOS 71 09/10/2021    BILITOT 0.4 09/10/2021     Lab Results   Component Value Date     09/13/2021    K 4.8 09/13/2021    K 4.4 08/31/2021     09/13/2021    CO2 23 09/13/2021    BUN 52 09/13/2021    CREATININE 1.1 09/13/2021    CREATININE 1.1 09/12/2021    CREATININE 0.9 09/11/2021    GFRAA 59 09/13/2021    LABGLOM 49 09/13/2021    GLUCOSE 251 09/13/2021    PROT 5.4 09/10/2021    LABALBU 2.7 09/10/2021    CALCIUM 9.1 09/13/2021    BILITOT 0.4 09/10/2021    ALKPHOS 71 09/10/2021    AST 20 09/10/2021    ALT 24 09/10/2021     Lab Results   Component Value Date    CRP 4.0 (H) 09/13/2021    CRP 1.4 (H) 09/12/2021    CRP 0.8 (H) 09/11/2021     No results found for: [address]  Radiology:      Microbiology:   Nares screen MRSA: Negative  Blood cultures 8/31/2021: Negative  Streptococcus pneumoniae/Legionella urine Ag: negative   Respiratory culture 9/11/2021: Pending    ASSESSMENT:  · SARS-CoV-2 infection with severe pneumonia. Completed course of Remdesivir.   CRP has gone up but she is past the window where she would have benefited from either Tocilizumab or Baricitinib  · Acute respiratory failure  · Leukocytosis.   She is on steroids  · Possible superimposed bacterial pneumonia, treated with Levofloxacin    PLAN:  · Continue supportive treatment  · Antibiotics are not warranted at this point  · We will follow closely with you     Case discussed with Dr. To Sims MD  11:40 AM  9/13/2021

## 2021-09-13 NOTE — PROGRESS NOTES
ENDOCRINOLOGY PROGRESS NOTE      Date of admission: 8/30/2021  Date of service: 9/10/2021  Admitting physician: Elva Slaughter MD   Primary Care Physician: Rasihd Snyder MD  Consultant physician: Manuel Severino MD     Reason for the consultation:  Uncontrolled DM    History of Present Illness: The history was seen thru ICU window due to James Lema is a 66 y.o. old female with PMH DM type 2, thyroid cancer, postsurgical hypothyroidism, lung cancer s/p right upper lobe lung surgery and other  listed below admitted to [address] [place] [address] on 8/30/2021 because of cough, fatigue. Associated body aches, endocrine service was consulted for diabetes management. Subjective   Pt was seen thru ICU window due to [place]:   Tube feeds     Point of care glucose monitoring   (Independently reviewed)   Recent Labs     09/12/21  1339 09/12/21  1800 09/12/21  2052 09/13/21  0139 09/13/21  0545 09/13/21  1008 09/13/21  1425 09/13/21  1841   GLUMET 360* 324* 298* 246* 233* 231* 292* 366*       Past medical history:   Past Medical History:   Diagnosis Date    Cancer ([facility] [address]     Diabetes mellitus ([facility] [address] [facility]     Hypertension     Hypothyroidism     Thyroid disease     Urinary incontinence        Past surgical history:  Past Surgical History:   Procedure Laterality Date    THYROIDECTOMY      TUBAL LIGATION         Social history:   Tobacco:   reports that she has never smoked. She has never used smokeless tobacco.  Alcohol:   reports no history of alcohol use. Drugs:   reports no history of drug use.     Family history:    Family History   Problem Relation Age of Onset    Diabetes Mother     Hypertension Mother     Stroke Mother     Diabetes Father     Hypertension Father     Thyroid Disease Daughter     Down Syndrome Daughter     Diabetes Maternal Grandmother     Hypertension Maternal Grandmother        Allergy and drug reactions:   No Known Allergies    Scheduled Meds:   Refresh Lacri-Lube   Ophthalmic Q6H  insulin glargine  50 Units SubCUTAneous Nightly    insulin lispro  0-30 Units SubCUTAneous Q4H    pantoprazole  40 mg IntraVENous Every Other Day    And    sodium chloride (PF)  10 mL IntraVENous Every Other Day    chlorhexidine  15 mL Mouth/Throat BID    albuterol  2.5 mg Nebulization Q4H WA    dexamethasone  6 mg IntraVENous Q6H    amLODIPine  5 mg Oral Daily    losartan  100 mg Oral Daily    And    hydroCHLOROthiazide  25 mg Oral Daily    guaiFENesin-dextromethorphan  5 mL Oral Q6H    sodium chloride flush  5-40 mL IntraVENous 2 times per day    enoxaparin  40 mg SubCUTAneous BID    Vitamin D  2,000 Units Oral Daily    zinc sulfate  50 mg Oral Daily    ascorbic acid  500 mg Oral BID    pravastatin  20 mg Oral Daily    levothyroxine  150 mcg Oral Daily     PRN Meds:   sodium chloride flush, 5-40 mL, PRN  sodium chloride, 25 mL, PRN  ondansetron, 4 mg, Q8H PRN   Or  ondansetron, 4 mg, Q6H PRN  polyethylene glycol, 17 g, Daily PRN  acetaminophen, 650 mg, Q6H PRN   Or  acetaminophen, 650 mg, Q6H PRN  glucose, 15 g, PRN  dextrose, 12.5 g, PRN  glucagon (rDNA), 1 mg, PRN  dextrose, 100 mL/hr, PRN  sodium chloride, 30 mL, PRN      Continuous Infusions:   fentaNYL 5 mcg/ml in 0.9%  ml infusion 175 mcg/hr (09/13/21 1630)    propofol 15 mcg/kg/min (09/13/21 1631)    cisatracurium (NIMBEX) infusion 2 mcg/kg/min (09/13/21 1201)    sodium chloride      dextrose       OBJECTIVE    BP (!) 147/55   Pulse 83   Temp 98.9 °F (37.2 °C) (Bladder)   Resp 23   Ht 5' 8\" (1.727 m)   Wt 212 lb 1.3 oz (96.2 kg)   SpO2 92%   BMI 32.25 kg/m²     Intake/Output Summary (Last 24 hours) at 9/13/2021 1955  Last data filed at 9/13/2021 1900  Gross per 24 hour   Intake 1346 ml   Output 1350 ml   Net -4 ml       Physical examination:  Pt was seen thru ICU window due to covid   Neck: no obvious neck mass.  No obvious neck deformity     CVS/chest Chest is moving with respiration    Extremities:  no visible tremor  Skin: No visible rashes as seen from camera   Musculoskeletal: no visible deformity    Review of Laboratory Data:  I personally reviewed the following labs:   Recent Labs     09/11/21  0626 09/12/21  0530 09/13/21  0600   WBC 14.8* 24.2* 19.9*   RBC 3.88 3.97 3.70   HGB 11.8 12.2 11.4*   HCT 35.7 36.8 34.7   MCV 92.0 92.7 93.8   MCH 30.4 30.7 30.8   MCHC 33.1 33.2 32.9   RDW 12.1 12.5 12.6    331 271   MPV 10.3 10.3 10.5     Recent Labs     09/11/21  0626 09/12/21  0530 09/13/21  0600    136 134   K 4.6 4.5 4.8    102 103   CO2 25 22 23   BUN 35* 46* 52*   CREATININE 0.9 1.1* 1.1*   GLUCOSE 172* 414* 251*   CALCIUM 9.3 9.2 9.1     No results found for: BHYDRXBUT  Lab Results   Component Value Date    LABA1C 7.4 08/31/2021    LABA1C 6.6 11/19/2013    LABA1C 6.6 08/27/2013     Lab Results   Component Value Date/Time    TSH 1.220 03/04/2016 10:26 AM     Lab Results   Component Value Date    LABA1C 7.4 08/31/2021    GLUCOSE 251 09/13/2021     No results found for: TRIG, HDL, LDLCALC, CHOL    Blood culture   Lab Results   Component Value Date    BC 5 Days no growth 08/31/2021    BC 5 Days- no growth 02/25/2020       Radiology:  XR CHEST PORTABLE   Final Result   1. 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 2. Progressively decreasing pneumomediastinum, unchanged bilateral pneumonia. XR CHEST PORTABLE   Final Result   1. Decreased pneumomediastinum, there is a small amount of extrapleural gas   in the right apex but no sign of pneumothorax. 2. Little change bilateral airspace consolidation concerning for pneumonia. 3. Lines okay         XR CHEST PORTABLE   Final Result   1. Pneumomediastinum. 2. Question of very small left apical pneumothorax. 3. Unchanged bilateral infiltrates. 4. Status post intubation with ET tube tip located 3.3 cm above gurinder.    Findings were sent to Radiology Results Communication Center at 5:13 a.m. on 09/11/2021 to be communicated to a licensed caregiver. XR CHEST ABDOMEN NG PLACEMENT   Final Result   Nasogastric tube terminates in stomach. XR CHEST PORTABLE   Final Result   Stable bilateral pulmonary airspace opacities. CTA PULMONARY W CONTRAST   Final Result   1. No indication for acute pulmonary emboli. 2.  Bilateral infiltrates that can be related with acute infectious viral   pneumonia. Please correlate clinically. 3.  Some fullness of the collecting system of both kidneys. See above   comments and recommendations. XR CHEST PORTABLE   Final Result   Bilateral hazy opacities are similar to the previous exam and are suspicious   for pneumonia. This appearance can be seen with COVID-19. US DUP LOWER EXTREMITIES BILATERAL VENOUS   Final Result   No evidence of DVT in either lower extremity. XR CHEST PORTABLE    (Results Pending)       Medical Records/Labs/Images review:   I personally reviewed and summarized previous records   All labs and imaging were reviewed independently     [address], a 66 y.o.-old female seen today for inpatient diabetes management     Diabetes Mellitus type 2  · Currently worsened with steroids   · On Tube feeds   · For now, will change diabetes regimen to:  · Lantus 50 Units nightly   · Modified High dose sliding scale with increments of 5U  Q4hrs   · Continue glucose check with meals and at bedtime   · Will titrate insulin dose based on the blood glucose trend & insulin requirement    COVID Pneumonia   · Management per critical care team     Thyroid cancer, s/p total thyroidectomy   · Pathology report not available  · Currently on Levothyroxine 150 mcg daily  · No recent TFT. Pt currently critically sick and on high dose steroids and these will likely affect thyroid lab result. Plan to check TFT once current condition improve     Thank you for allowing us to participate in the care of this patient. Please do not hesitate to contact us with any additional questions. Wolf Calero MD  Endocrinologist, [facility] and Endocrinology   [address] [facility] [address]   Phone: [phone]  Fax: [phone]  --------------------------------------------  An electronic signature was used to authenticate this note.  Shelby Case MD on 9/13/2021 at 7:55 PM

## 2021-09-13 NOTE — PROGRESS NOTES
Critical Care Team - Daily Progress Note      Date and time: 2021 11:36 AM  Patient's name:  Shea Nieves  Medical Record Number: [id number]  Patient's account/billing number: [de-identified]  Patient's YOB: 1950  Age: 66 y.o. Date of Admission: 2021  2:25 PM  Length of stay during current admission: 14      Primary Care Physician: Santa Ford MD  ICU Attending Physician: Dr. Yandy Maynard    Code Status: Full Code    Reason for ICU admission: Acute respiratory failure with hypoxia COVID-19      SUBJECTIVE:     OVERNIGHT EVENTS:       No acute events overnight for the patient    9/10: Acute respiratory distress overnight on BiPAP and she required intubation. : Patient overnight had increasing respiratory distress, was intubated by anesthesia. Patient sedation minimal and patient was awake, will increase sedation    : patient remains prone, FiO2 to 80%     no acute events overnight patient remains prone at this time. OBJECTIVE:     VITAL SIGNS:  BP (!) 147/55   Pulse 78   Temp 99 °F (37.2 °C) (Bladder)   Resp 24   Ht 5' 8\" (1.727 m)   Wt 212 lb 1.3 oz (96.2 kg)   SpO2 91%   BMI 32.25 kg/m²   Tmax over 24 hours:  Temp (24hrs), Av.6 °F (37 °C), Min:98.2 °F (36.8 °C), Max:99 °F (37.2 °C)      Patient Vitals for the past 6 hrs:   BP Temp Temp src Pulse Resp SpO2   21 1100 -- -- -- 78 24 91 %   21 1000 -- -- -- 78 24 91 %   21 0900 -- -- -- 83 24 93 %   21 0854 -- -- -- -- 24 93 %   21 0800 -- 99 °F (37.2 °C) Bladder 75 24 94 %   21 0700 -- -- -- 72 24 94 %   21 0600 (!) 147/55 -- -- 78 24 97 %         Intake/Output Summary (Last 24 hours) at 2021 1136  Last data filed at 2021 0800  Gross per 24 hour   Intake 2029 ml   Output 1070 ml   Net 959 ml     Wt Readings from Last 2 Encounters:   21 212 lb 1.3 oz (96.2 kg)   21 213 lb (96.6 kg)     Body mass index is 32.25 kg/m².         PHYSICAL mg, Q6H PRN  polyethylene glycol, 17 g, Daily PRN  acetaminophen, 650 mg, Q6H PRN   Or  acetaminophen, 650 mg, Q6H PRN  glucose, 15 g, PRN  dextrose, 12.5 g, PRN  glucagon (rDNA), 1 mg, PRN  dextrose, 100 mL/hr, PRN  sodium chloride, 30 mL, PRN          VENT SETTINGS (Comprehensive) (if applicable):  Vent Information  $Ventilation: $Subsequent Day  Skin Assessment: Clean, dry, & intact  Equipment ID: [id number]  Equipment Changed: (S) Humidification  Vent Type: 980  Vent Mode: AC/VC  Vt Ordered: 380 mL  Rate Set: 24 bmp  Peak Flow: 60 L/min  Pressure Support: 0 cmH20  FiO2 : 80 %  SpO2: 91 %  SpO2/FiO2 ratio: 113.75  Sensitivity: 2  PEEP/CPAP: 12  I Time/ I Time %: 0 s  Humidification Source: Heated wire  Humidification Temp: 37  Humidification Temp Measured: 37  Circuit Condensation: Drained  Mask Type: Full face mask  Mask Size: Medium  Additional Respiratory  Assessments  Pulse: 78  Resp: 24  SpO2: 91 %  Humidification Source: Heated wire  Humidification Temp: 37  Circuit Condensation: Drained  Oral Care: Mouth swabbed, Mouth moisturizer  Subglottic Suction Done?: Yes  Cuff Pressure (cm H2O): 29 cm H2O    ABGs:     Laboratory findings:    Complete Blood Count:   Recent Labs     09/11/21 0626 09/12/21 0530 09/13/21  0600   WBC 14.8* 24.2* 19.9*   HGB 11.8 12.2 11.4*   HCT 35.7 36.8 34.7    331 271        Last 3 Blood Glucose:   Recent Labs     09/11/21 0626 09/12/21 0530 09/13/21  0600   GLUCOSE 172* 414* 251*        PT/INR:    Lab Results   Component Value Date    PROTIME 13.1 03/05/2016    INR 1.2 03/05/2016     PTT:    Lab Results   Component Value Date    APTT 32.5 03/05/2016       Comprehensive Metabolic Profile:   Recent Labs     09/11/21 0626 09/12/21 0530 09/13/21  0600    136 134   K 4.6 4.5 4.8    102 103   CO2 25 22 23   BUN 35* 46* 52*   CREATININE 0.9 1.1* 1.1*   GLUCOSE 172* 414* 251*   CALCIUM 9.3 9.2 9.1      Magnesium:   Lab Results   Component Value Date    MG 2.6 09/13/2021 Phosphorus:   Lab Results   Component Value Date    PHOS 3.1 09/13/2021     Ionized Calcium: No results found for: CAION     Urinalysis:     Troponin: No results for input(s): TROPONINI in the last 72 hours. ASSESSMENT:     Patient Active Problem List    Diagnosis Date Noted    Sepsis ([facility] [address] 03/06/2016    [facility] 03/06/2016    Acute cystitis 03/06/2016    Type 2 diabetes mellitus without complication ([facility] 18/94/6755    Hypertension 03/06/2016    Pneumonia due to COVID-19 virus 08/30/2021    Acute respiratory failure with hypoxia (HCC)     Elevated LFTs     Uncontrolled type 2 diabetes mellitus with hyperglycemia [facility]     UTI due to extended-spectrum beta lactamase (ESBL) producing Escherichia coli 12/05/2018     SYSTEMS ASSESSMENT    Neuro     Sedated and paralyzed  Anxiety  Propofol fentanyl for sedation  Paralytic on board  Continue to monitor    Respiratory     Acute hypoxic respiratory failure secondary to COVID-19  COVID-19 pneumonia  Decadron 6 mg every 6 hours    Cardiovascular     History of hypertension  Amlodipine-5 mg  Losartan 100 mg, hydrochlorothiazide 25 mg daily  Pravastatin 20 mg orally    Gastrointestinal   Prophylaxis  Continue monitor    Renal   Function stable urine output stable  Continue monitor     Infectious Disease   COVID-19 pneumonia  Infectious disease following  Trend inflammatory markers  Continue monitor  Hematology/Oncology       High-dose sliding scale Lovenox 40 mg twice daily    Endocrine   DM on Lantus  Hypothyroidism-Synthroid 150 MCG  continue to monitor    Social/Spiritual/DNR/Other     Full code  Vitamin protocol    The patient was seen and evaluated by myself and Dr. Vahid Lester MD PGY-2  9/13/2021 11:36 AM    Critical Care Attending Addendum:    Patient seen and examined with the house staff. X-rays personally reviewed through the PACS. Family is updated at the bedside as available.   Additional findings listed below as necessary. Additional comments:  1. Severe acute hypoxic respiratory failure from COVID 19, sedated, proned and on 80% FIO2 +12. Wean FIO2 as able then PEEP. 2. COVID 19 pneumonia d/w dtr in law asking about iverrnectin and toci. I discussed with Dr. Jairo Quintero and we both feel neither are indicated in current circumstances. 3. Keep sedated and paralyzed for now. 4. HTN controlled. 5. DM acceptably controlled.     >30 min CCT

## 2021-09-13 NOTE — PLAN OF CARE
Problem: Airway Clearance - Ineffective  Goal: Achieve or maintain patent airway  Outcome: Met This Shift     Problem: Gas Exchange - Impaired  Goal: Absence of hypoxia  Outcome: Met This Shift     Problem:  Body Temperature -  Risk of, Imbalanced  Goal: Will regain or maintain usual level of consciousness  Outcome: Met This Shift     Problem: Fatigue  Goal: Verbalize increase energy and improved vitality  Outcome: Met This Shift

## 2021-09-14 NOTE — PROGRESS NOTES
Peterson catheter not draining. Irrigated peterson catheter with 50cc NS. No leaking noted. Did have immediate return of 470cc clear yellow urine.

## 2021-09-14 NOTE — PROGRESS NOTES
Pulse ox 85%. Suctioned patient after hit 100% fio2. FIO2 increased to 90%. Respiratory here. Dr. Myron Dietz here with new orders noted. Portable chest xray ordered.

## 2021-09-14 NOTE — PLAN OF CARE
Problem: Airway Clearance - Ineffective  Goal: Achieve or maintain patent airway  9/14/2021 0323 by Rona Ba RN  Outcome: Met This Shift  9/13/2021 1942 by Darío Calero RN  Outcome: Met This Shift     Problem: Gas Exchange - Impaired  Goal: Absence of hypoxia  9/14/2021 0323 by Rona Ba RN  Outcome: Met This Shift  9/13/2021 1942 by Darío Calero RN  Outcome: Met This Shift  Goal: Promote optimal lung function  9/14/2021 0323 by Rona Ba RN  Outcome: Met This Shift  9/13/2021 1942 by Darío Calero RN  Outcome: Met This Shift     Problem: Breathing Pattern - Ineffective  Goal: Ability to achieve and maintain a regular respiratory rate  9/14/2021 0323 by Rona Ba RN  Outcome: Met This Shift  9/13/2021 1942 by Darío Calero RN  Outcome: Met This Shift     Problem:  Body Temperature -  Risk of, Imbalanced  Goal: Ability to maintain a body temperature within defined limits  9/14/2021 0323 by Rona Ba RN  Outcome: Met This Shift  9/13/2021 1942 by Darío Calero RN  Outcome: Met This Shift  Goal: Will regain or maintain usual level of consciousness  9/14/2021 0323 by Rona Ba RN  Outcome: Met This Shift  9/13/2021 1942 by Darío Calero RN  Outcome: Met This Shift  Goal: Complications related to the disease process, condition or treatment will be avoided or minimized  9/14/2021 0323 by Rona Ba RN  Outcome: Met This Shift  9/13/2021 1942 by Darío Calero RN  Outcome: Met This Shift     Problem: Isolation Precautions - Risk of Spread of Infection  Goal: Prevent transmission of infection  9/14/2021 0323 by Rona Ba RN  Outcome: Met This Shift  9/13/2021 1942 by Darío Calero RN  Outcome: Met This Shift     Problem: Nutrition Deficits  Goal: Optimize nutritional status  9/14/2021 0323 by Rona Ba RN  Outcome: Met This Shift  9/13/2021 1942 by Darío Calero RN  Outcome: Met This Shift     Problem: Risk for Fluid Volume Deficit  Goal: Maintain normal heart rhythm  9/14/2021 0323 by Shawna Garner RN  Outcome: Met This Shift  9/13/2021 1942 by Todd Irwin RN  Outcome: Met This Shift  Goal: Maintain absence of muscle cramping  9/14/2021 0323 by Shawna Garner RN  Outcome: Met This Shift  9/13/2021 1942 by Todd Irwin RN  Outcome: Met This Shift  Goal: Maintain normal serum potassium, sodium, calcium, phosphorus, and pH  9/14/2021 0323 by Shawna Garner RN  Outcome: Met This Shift  9/13/2021 1942 by Todd Irwin RN  Outcome: Met This Shift     Problem: Loneliness or Risk for Loneliness  Goal: Demonstrate positive use of time alone when socialization is not possible  9/14/2021 0323 by Shawna Garner RN  Outcome: Met This Shift  9/13/2021 1942 by Todd Irwin RN  Outcome: Met This Shift     Problem: Patient Education: Go to Patient Education Activity  Goal: Patient/Family Education  9/14/2021 0323 by Shawna Garner RN  Outcome: Met This Shift  9/13/2021 1942 by Todd Irwin RN  Outcome: Met This Shift     Problem: Fatigue  Goal: Verbalize increase energy and improved vitality  9/14/2021 0323 by Shawna Garner RN  Outcome: Met This Shift  9/13/2021 1942 by Todd Irwin RN  Outcome: Met This Shift     Problem: Pain:  Goal: Pain level will decrease  Description: Pain level will decrease  9/14/2021 0323 by Shawna Garner RN  Outcome: Met This Shift  9/13/2021 1942 by Todd Irwin RN  Outcome: Met This Shift  Goal: Control of acute pain  Description: Control of acute pain  9/14/2021 0323 by Shawna Garner RN  Outcome: Met This Shift  9/13/2021 1942 by Todd Irwin RN  Outcome: Met This Shift  Goal: Control of chronic pain  Description: Control of chronic pain  9/14/2021 0323 by Shawna Garner RN  Outcome: Met This Shift  9/13/2021 1942 by Todd Irwin RN  Outcome: Met This Shift     Problem: Skin Integrity:  Goal: Will show no infection signs and symptoms  Description: Will show no infection signs and symptoms  9/14/2021 0323 by Heber Massey RN  Outcome: Met This Shift  9/13/2021 1942 by Steve Murrell RN  Outcome: Met This Shift  Goal: Absence of new skin breakdown  Description: Absence of new skin breakdown  9/14/2021 0323 by Heber Massey RN  Outcome: Met This Shift  9/13/2021 1942 by Steve Murrell RN  Outcome: Met This Shift

## 2021-09-14 NOTE — PROGRESS NOTES
.  Intensive Care Daily Quality Rounding Checklist      ICU Team Members: Dr. Tom Chua, Dr. Aldo Herr (resident), clinical pharmacist, pharmacist students, bedside nurse, charge nurse, respiratory therapy    ICU Day #: NUMBER: 7    Intubation Date: September 11,2021    Ventilator Day #: NUMBER: 4    Central Line Insertion Date: September 11,2021        Day #: NUMBER: 4     Arterial Line Insertion Date: September 11,2021      Day #: NUMBER: 4    Temporary Hemodialysis Catheter Insertion Date:  N/A      Day # N/A    DVT Prophylaxis: Lovenox    GI Prophylaxis: Protonix    Pinzon Catheter Insertion Date: September 8,2021       Day #: 7      Continued need (if yes, reason documented and discussed with physician): yes, strict I&O in critical patient    Skin Issues/ Wounds and ordered treatment discussed on rounds: no issues/SOS precautions    Goals/ Plans for the Day: monitor labs and vitals,continue prone position, wean paralytic as able, continue critical care management

## 2021-09-14 NOTE — PROCEDURES
Arterial Line Placement Procedure Note                     Indication: arterial blood gases and mechanical ventilation    Consent: Unable to be obtained due to the emergent nature of this procedure. Pascual's Test: Ultrasound showed patent ulnar and radial arteries    Procedure: The skin over the left radial artery was prepped with betadine and draped in a sterile fashion. Local anesthesia was obtained by infiltration using no anesthesia. A 20 gauge arterial line catheter was then inserted, over a needle, into the vessel. The transducer set was then attached and securely fastened to the skin with sutures. Waveforms on the monitor were observed and found to be adequate. The patient had good distal perfusion after the procedure. The site was then dressed in a sterile fashion. The patient tolerated the procedure well. Complications: None    I supervised procedure.

## 2021-09-14 NOTE — PROGRESS NOTES
ENDOCRINOLOGY PROGRESS NOTE      Date of admission: 8/30/2021  Date of service: 9/10/2021  Admitting physician: Fern Hendricks MD   Primary Care Physician: Santa Ford MD  Consultant physician: Carie Espinoza MD     Reason for the consultation:  Uncontrolled DM    History of Present Illness: The history was seen thru ICU window due to Mikel Salgado is a 66 y.o. old female with PMH DM type 2, thyroid cancer, postsurgical hypothyroidism, lung cancer s/p right upper lobe lung surgery and other  listed below admitted to [facility] on 8/30/2021 because of cough, fatigue. Associated body aches, endocrine service was consulted for diabetes management. Subjective   Pt was seen thru ICU window due to [place]:   Tube feeds     Point of care glucose monitoring   (Independently reviewed)   Recent Labs     09/13/21  1425 09/13/21  1841 09/13/21  2143 09/14/21  0241 09/14/21  0543 09/14/21  1001 09/14/21  1459 09/14/21  1728   GLUMET 292* 366* 350* 248* 241* 183* 210* 271*       Past medical history:   Past Medical History:   Diagnosis Date    Cancer ([facility] [address]     Diabetes mellitus ([facility] [address]     Hypertension     Hypothyroidism     Thyroid disease     Urinary incontinence        Past surgical history:  Past Surgical History:   Procedure Laterality Date    THYROIDECTOMY      TUBAL LIGATION         Social history:   Tobacco:   reports that she has never smoked. She has never used smokeless tobacco.  Alcohol:   reports no history of alcohol use. Drugs:   reports no history of drug use.     Family history:    Family History   Problem Relation Age of Onset    Diabetes Mother     Hypertension Mother     Stroke Mother     Diabetes Father     Hypertension Father     Thyroid Disease Daughter     Down Syndrome Daughter     Diabetes Maternal Grandmother     Hypertension Maternal Grandmother        Allergy and drug reactions:   No Known Allergies    Scheduled Meds:   [START ON 9/15/2021] pantoprazole  40 mg IntraVENous Daily    And    [START ON 9/15/2021] sodium chloride (PF)  10 mL IntraVENous Daily    norepinephrine        norepinephrine        norepinephrine        Refresh Lacri-Lube   Ophthalmic Q6H    insulin glargine  50 Units SubCUTAneous Nightly    insulin lispro  0-30 Units SubCUTAneous Q4H    chlorhexidine  15 mL Mouth/Throat BID    albuterol  2.5 mg Nebulization Q4H WA    dexamethasone  6 mg IntraVENous Q6H    amLODIPine  5 mg Oral Daily    losartan  100 mg Oral Daily    And    hydroCHLOROthiazide  25 mg Oral Daily    guaiFENesin-dextromethorphan  5 mL Oral Q6H    sodium chloride flush  5-40 mL IntraVENous 2 times per day    enoxaparin  40 mg SubCUTAneous BID    Vitamin D  2,000 Units Oral Daily    zinc sulfate  50 mg Oral Daily    ascorbic acid  500 mg Oral BID    pravastatin  20 mg Oral Daily    levothyroxine  150 mcg Oral Daily     PRN Meds:   sodium chloride flush, 5-40 mL, PRN  sodium chloride, 25 mL, PRN  ondansetron, 4 mg, Q8H PRN   Or  ondansetron, 4 mg, Q6H PRN  polyethylene glycol, 17 g, Daily PRN  acetaminophen, 650 mg, Q6H PRN   Or  acetaminophen, 650 mg, Q6H PRN  glucose, 15 g, PRN  dextrose, 12.5 g, PRN  glucagon (rDNA), 1 mg, PRN  dextrose, 100 mL/hr, PRN  sodium chloride, 30 mL, PRN      Continuous Infusions:   norepinephrine 15 mcg/min (09/14/21 1818)    fentaNYL 5 mcg/ml in 0.9%  ml infusion 175 mcg/hr (09/14/21 1638)    propofol 10 mcg/kg/min (09/14/21 0454)    cisatracurium (NIMBEX) infusion 2 mcg/kg/min (09/14/21 1600)    sodium chloride      dextrose       OBJECTIVE    BP (!) 127/58   Pulse 74   Temp 98.6 °F (37 °C) (Bladder)   Resp 24   Ht 5' 8\" (1.727 m)   Wt 212 lb 1.3 oz (96.2 kg)   SpO2 (!) 87%   BMI 32.25 kg/m²     Intake/Output Summary (Last 24 hours) at 9/14/2021 1945  Last data filed at 9/14/2021 1807  Gross per 24 hour   Intake 2680 ml   Output 1030 ml   Net 1650 ml       Physical examination:  Pt was seen thru ICU window due to covid Neck: no obvious neck mass. No obvious neck deformity     CVS/chest Chest is moving with respiration    Extremities:  no visible tremor  Skin: No visible rashes as seen from camera   Musculoskeletal: no visible deformity    Review of Laboratory Data:  I personally reviewed the following labs:   Recent Labs     09/12/21  0530 09/13/21  0600 09/14/21  0530   WBC 24.2* 19.9* 28.3*   RBC 3.97 3.70 4.00   HGB 12.2 11.4* 12.2   HCT 36.8 34.7 38.2   MCV 92.7 93.8 95.5   MCH 30.7 30.8 30.5   MCHC 33.2 32.9 31.9*   RDW 12.5 12.6 12.8    271 293   MPV 10.3 10.5 10.6     Recent Labs     09/12/21  0530 09/13/21  0600 09/14/21  0530    134 134   K 4.5 4.8 5.2*    103 102   CO2 22 23 26   BUN 46* 52* 63*   CREATININE 1.1* 1.1* 1.2*   GLUCOSE 414* 251* 243*   CALCIUM 9.2 9.1 9.3   PROT  --   --  5.5*   LABALBU  --   --  2.6*   BILITOT  --   --  <0.2   ALKPHOS  --   --  82   AST  --   --  40*   ALT  --   --  58*     No results found for: BHYDRXBUT  Lab Results   Component Value Date    LABA1C 7.4 08/31/2021    LABA1C 6.6 11/19/2013    LABA1C 6.6 08/27/2013     Lab Results   Component Value Date/Time    TSH 1.220 03/04/2016 10:26 AM     Lab Results   Component Value Date    LABA1C 7.4 08/31/2021    GLUCOSE 243 09/14/2021     No results found for: TRIG, HDL, LDLCALC, CHOL    Blood culture   Lab Results   Component Value Date    BC 5 Days no growth 08/31/2021    BC 5 Days- no growth 02/25/2020       Radiology:  XR CHEST PORTABLE   Final Result   1. Multifocal bilateral airspace disease which is more advanced when compared   with the patient's prior study of 09/11/2021   2. Findings of pneumomediastinum. There is significant subcutaneous   emphysema seen within the soft tissues of the neck. XR CHEST PORTABLE   Final Result   1. 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 2. Progressively decreasing pneumomediastinum, unchanged bilateral pneumonia.          XR CHEST PORTABLE   Final Result   1. Decreased pneumomediastinum, there is a small amount of extrapleural gas   in the right apex but no sign of pneumothorax. 2. Little change bilateral airspace consolidation concerning for pneumonia. 3. Lines okay         XR CHEST PORTABLE   Final Result   1. Pneumomediastinum. 2. Question of very small left apical pneumothorax. 3. Unchanged bilateral infiltrates. 4. Status post intubation with ET tube tip located 3.3 cm above gurinder. Findings were sent to Radiology Results [address] at 5:13 a.m. on   09/11/2021 to be communicated to a licensed caregiver. XR CHEST ABDOMEN NG PLACEMENT   Final Result   Nasogastric tube terminates in stomach. XR CHEST PORTABLE   Final Result   Stable bilateral pulmonary airspace opacities. CTA PULMONARY W CONTRAST   Final Result   1. No indication for acute pulmonary emboli. 2.  Bilateral infiltrates that can be related with acute infectious viral   pneumonia. Please correlate clinically. 3.  Some fullness of the collecting system of both kidneys. See above   comments and recommendations. XR CHEST PORTABLE   Final Result   Bilateral hazy opacities are similar to the previous exam and are suspicious   for pneumonia. This appearance can be seen with COVID-19. US DUP LOWER EXTREMITIES BILATERAL VENOUS   Final Result   No evidence of DVT in either lower extremity.              Medical Records/Labs/Images review:   I personally reviewed and summarized previous records   All labs and imaging were reviewed independently     [address], a 66 y.o.-old female seen today for inpatient diabetes management     Diabetes Mellitus type 2  · Currently worsened with steroids   · Tube feed was on hold this afternoon   · For now, will change diabetes regimen to:  · Lantus 50 Units nightly   · Modified High dose sliding scale with increments of 5U  Q4hrs   · Continue glucose check

## 2021-09-14 NOTE — PROGRESS NOTES
Spoke with family, patient's daughter and brother. They asked several questions. I did update them on the patient's condition her being on BiPAP and trialing off earlier today on OptiFlow as well as nonrebreather mask and patient tolerate that. Talked all patient's lab work and imaging results as well including her D-dimer and chest x-ray. This stable patient's timeline as well. All her questions were answered and had no further questions. I did advise they can call back if they have any other questions in the future as well.

## 2021-09-14 NOTE — PROGRESS NOTES
[facility] Progress Note    Admitting Date and Time: 8/30/2021  2:25 PM  Admit Dx: Pneumonia due to COVID-19 virus [U07.1, J12.82]    Subjective:  Patient is being followed for Pneumonia due to COVID-19 virus [U07.1, J12.82]     Patient continues to be proned. She has been afebrile. ROS: denies fever, chills, cp, sob, n/v, HA unless stated above.       Refresh Lacri-Lube   Ophthalmic Q6H    insulin glargine  50 Units SubCUTAneous Nightly    insulin lispro  0-30 Units SubCUTAneous Q4H    pantoprazole  40 mg IntraVENous Every Other Day    And    sodium chloride (PF)  10 mL IntraVENous Every Other Day    chlorhexidine  15 mL Mouth/Throat BID    albuterol  2.5 mg Nebulization Q4H WA    dexamethasone  6 mg IntraVENous Q6H    amLODIPine  5 mg Oral Daily    losartan  100 mg Oral Daily    And    hydroCHLOROthiazide  25 mg Oral Daily    guaiFENesin-dextromethorphan  5 mL Oral Q6H    sodium chloride flush  5-40 mL IntraVENous 2 times per day    enoxaparin  40 mg SubCUTAneous BID    Vitamin D  2,000 Units Oral Daily    zinc sulfate  50 mg Oral Daily    ascorbic acid  500 mg Oral BID    pravastatin  20 mg Oral Daily    levothyroxine  150 mcg Oral Daily     sodium chloride flush, 5-40 mL, PRN  sodium chloride, 25 mL, PRN  ondansetron, 4 mg, Q8H PRN   Or  ondansetron, 4 mg, Q6H PRN  polyethylene glycol, 17 g, Daily PRN  acetaminophen, 650 mg, Q6H PRN   Or  acetaminophen, 650 mg, Q6H PRN  glucose, 15 g, PRN  dextrose, 12.5 g, PRN  glucagon (rDNA), 1 mg, PRN  dextrose, 100 mL/hr, PRN  sodium chloride, 30 mL, PRN         Objective:    BP (!) 86/51   Pulse 86   Temp 98.6 °F (37 °C) (Bladder)   Resp 23   Ht 5' 8\" (1.727 m)   Wt 212 lb 1.3 oz (96.2 kg)   SpO2 90%   BMI 32.25 kg/m²     General Appearance: alert and oriented to person, place and time and in no acute distress  Skin: warm and dry  Head: normocephalic and atraumatic  Eyes: pupils equal, round, and reactive to light, extraocular eye movements intact, conjunctivae normal  Neck: neck supple and non tender without mass   Pulmonary/Chest: decreased breath sounds bilaterally  Cardiovascular: normal rate, normal S1 and S2 and no carotid bruits  Abdomen: soft, non-tender, non-distended, normal bowel sounds, no masses or organomegaly  Extremities: no cyanosis, no clubbing and no edema  Neurologic: no cranial nerve deficit and speech normal        Recent Labs     09/12/21  0530 09/13/21  0600 09/14/21  0530    134 134   K 4.5 4.8 5.2*    103 102   CO2 22 23 26   BUN 46* 52* 63*   CREATININE 1.1* 1.1* 1.2*   GLUCOSE 414* 251* 243*   CALCIUM 9.2 9.1 9.3       Recent Labs     09/12/21 0530 09/13/21 0600 09/14/21  0530   WBC 24.2* 19.9* 28.3*   RBC 3.97 3.70 4.00   HGB 12.2 11.4* 12.2   HCT 36.8 34.7 38.2   MCV 92.7 93.8 95.5   MCH 30.7 30.8 30.5   MCHC 33.2 32.9 31.9*   RDW 12.5 12.6 12.8    271 293   MPV 10.3 10.5 10.6       Radiology:     Pending CXR    Assessment:    Active Problems:    Pneumonia due to COVID-19 virus    Acute respiratory failure with hypoxia (HCC)    Elevated LFTs    Uncontrolled type 2 diabetes mellitus with hyperglycemia ([facility] [address]  Resolved Problems:    * No resolved hospital problems. *      Plan:       1. Sepsis secondary to COVID-19 pneumonia - Patient completed remdesivir. Continue Decadron 6 mg every 6 hours and vitamin cocktail. Patient received Levaquin for 5 days for possible superimposed bacterial infection. Will repeat procalcitonin and CXR to see if there is any other underlying pathology aside from Covid. CRP trending up and hypoxia worsening. Not a candidate for tociluzimab nor baricitinib. Continue pronation. Follow-up with ID recommendations  2. Acute hypoxic respiratory failure secondary to COVID-19 pneumonia status post mechanical ventilation - Patient is currently on fentanyl, nimbex as well as propofol for sedation. Follow up with procalcitonin and CXR  3.   Diabetes mellitus type 2, hyperglycemia - Currently on Lantus 50 units daily as well as lispro every 4 hours, high-dose. We will continue to monitor blood sugars closely. Endocrinology on board. 4.  Hypertension -  Patient is on Norvasc, losartan, hydrochlorothiazide however there are some episodes of hypotension and so will place holding parameters. 5.  Hypothyroidism - Continue Synthroid. Repeat thyroid panel once acute illness resolved and off of steroids   6. HLD - Continue pravastatin  7. DVT prophylaxis - Lovenox 40 units twice daily. 8. Nutrition - TF via OG      NOTE: This report was transcribed using voice recognition software. Every effort was made to ensure accuracy; however, inadvertent computerized transcription errors may be present. Electronically signed by Claudean Sat DO on 9/14/2021 at 9:31 AM     Due to the patient's Covid 19+ status, to reduce exposure, contamination, and an effort to conserve PPE, this patient was evaluated with combination of review of the medical record, nursing assessments, other physicians exam and assessments, as well as telemedicine interaction.

## 2021-09-14 NOTE — CARE COORDINATION
Continue ICU, COVID+. Continue vent support, FiO2-80%. Continue paralytic and sedation. Pt currently remains proned.  [facility] following-mjo

## 2021-09-14 NOTE — PLAN OF CARE
Problem: Airway Clearance - Ineffective  Goal: Achieve or maintain patent airway  9/14/2021 1221 by Eve Alpers, RN  Outcome: Met This Shift  9/14/2021 0323 by Tory Trevino RN  Outcome: Met This Shift     Problem: Gas Exchange - Impaired  Goal: Absence of hypoxia  9/14/2021 1221 by Eve Alpers, RN  Outcome: Ongoing  9/14/2021 0323 by Tory Trevino RN  Outcome: Met This Shift  Goal: Promote optimal lung function  9/14/2021 1221 by Eve Alpers, RN  Outcome: Ongoing  9/14/2021 0323 by Tory Trevino RN  Outcome: Met This Shift     Problem: Breathing Pattern - Ineffective  Goal: Ability to achieve and maintain a regular respiratory rate  9/14/2021 1221 by Eve Alpers, RN  Outcome: Met This Shift  9/14/2021 0323 by Tory Trevino RN  Outcome: Met This Shift     Problem:  Body Temperature -  Risk of, Imbalanced  Goal: Ability to maintain a body temperature within defined limits  9/14/2021 1221 by Eve Alpers, RN  Outcome: Met This Shift  9/14/2021 0323 by Tory Trevino RN  Outcome: Met This Shift  Goal: Will regain or maintain usual level of consciousness  9/14/2021 1221 by Eve Alpers, RN  Outcome: Not Met This Shift  9/14/2021 0323 by Tory Trevino RN  Outcome: Met This Shift  Goal: Complications related to the disease process, condition or treatment will be avoided or minimized  9/14/2021 1221 by Eve Alpers, RN  Outcome: Met This Shift  9/14/2021 0323 by Tory Trevino RN  Outcome: Met This Shift     Problem: Isolation Precautions - Risk of Spread of Infection  Goal: Prevent transmission of infection  9/14/2021 1221 by Eve Alpers, RN  Outcome: Met This Shift  9/14/2021 0323 by Tory Trevino RN  Outcome: Met This Shift     Problem: Nutrition Deficits  Goal: Optimize nutritional status  9/14/2021 1221 by Eve Alpers, RN  Outcome: Met This Shift  9/14/2021 0323 by Tory Trevino RN  Outcome: Met This Shift     Problem: Risk for Fluid Volume Deficit  Goal: Maintain normal heart rhythm  9/14/2021 1221 by Andrew Shaffer RN  Outcome: Met This Shift  9/14/2021 0323 by Braeden Byrne RN  Outcome: Met This Shift  Goal: Maintain absence of muscle cramping  9/14/2021 1221 by Andrew Shaffer RN  Outcome: Met This Shift  9/14/2021 0323 by Braeden Byrne RN  Outcome: Met This Shift  Goal: Maintain normal serum potassium, sodium, calcium, phosphorus, and pH  9/14/2021 1221 by Andrew Shaffer RN  Outcome: Ongoing  9/14/2021 0323 by Braeden Byrne RN  Outcome: Met This Shift     Problem: Loneliness or Risk for Loneliness  Goal: Demonstrate positive use of time alone when socialization is not possible  9/14/2021 1221 by Andrew Shaffer RN  Outcome: Not Met This Shift  9/14/2021 0323 by Braeden Byrne RN  Outcome: Met This Shift     Problem: Fatigue  Goal: Verbalize increase energy and improved vitality  9/14/2021 1221 by Andrew Shaffer RN  Outcome: Not Met This Shift  9/14/2021 0323 by Braeden Byrne RN  Outcome: Met This Shift     Problem: Patient Education: Go to Patient Education Activity  Goal: Patient/Family Education  9/14/2021 1221 by Andrew Shaffer RN  Outcome: Ongoing  9/14/2021 0323 by Braeden Byrne RN  Outcome: Met This Shift     Problem: Pain:  Goal: Pain level will decrease  Description: Pain level will decrease  9/14/2021 1221 by Andrew Shaffer RN  Outcome: Met This Shift  9/14/2021 0323 by Braeden Byrne RN  Outcome: Met This Shift  Goal: Control of acute pain  Description: Control of acute pain  9/14/2021 1221 by Andrew Shaffer RN  Outcome: Met This Shift  9/14/2021 0323 by Braeden Byrne RN  Outcome: Met This Shift  Goal: Control of chronic pain  Description: Control of chronic pain  9/14/2021 1221 by Andrew Shaffer RN  Outcome: Met This Shift  9/14/2021 0323 by Braeden Byrne RN  Outcome: Met This Shift     Problem: Skin Integrity:  Goal: Will show no infection signs and symptoms  Description: Will show no

## 2021-09-14 NOTE — PROGRESS NOTES
Critical Care Team - Daily Progress Note      Date and time: 2021 10:08 AM  Patient's name:  Effie Jacinto  Medical Record Number: [id number]  Patient's account/billing number: [de-identified]  Patient's YOB: 1950  Age: 66 y.o. Date of Admission: 2021  2:25 PM  Length of stay during current admission: 15      Primary Care Physician: Dorothea Salmeron MD  ICU Attending Physician: Dr. Ricci Coma    Code Status: Full Code    Reason for ICU admission: Acute respiratory failure with hypoxia COVID-19      SUBJECTIVE:     OVERNIGHT EVENTS:       No acute events overnight for the patient    9/10: Acute respiratory distress overnight on BiPAP and she required intubation. : Patient overnight had increasing respiratory distress, was intubated by anesthesia. Patient sedation minimal and patient was awake, will increase sedation    : patient remains prone, FiO2 to 80%     no acute events overnight patient remains prone at this time. : No acute events overnight per overnight nursing staff patient's not required any ventilation changes at this time and saturating well. OBJECTIVE:     VITAL SIGNS:  BP (!) 86/51   Pulse 81   Temp 98.6 °F (37 °C) (Bladder)   Resp 23   Ht 5' 8\" (1.727 m)   Wt 212 lb 1.3 oz (96.2 kg)   SpO2 90%   BMI 32.25 kg/m²   Tmax over 24 hours:  Temp (24hrs), Av.8 °F (37.1 °C), Min:98.6 °F (37 °C), Max:99 °F (37.2 °C)      Patient Vitals for the past 6 hrs:   Pulse Resp SpO2   21 1002 81 23 90 %   21 0600 86 23 90 %   21 0500 79 23 91 %   21 0443 80 23 91 %         Intake/Output Summary (Last 24 hours) at 2021 1008  Last data filed at 2021 0500  Gross per 24 hour   Intake 575 ml   Output 1130 ml   Net -555 ml     Wt Readings from Last 2 Encounters:   21 212 lb 1.3 oz (96.2 kg)   21 213 lb (96.6 kg)     Body mass index is 32.25 kg/m².         PHYSICAL EXAMINATION:    General appearance -intubated sedated and proned at this time  Mental status - sedated, no history available  Eyes - pupils equal and reactive   Chest - coarse bs to auscultation, no wheezes, rales or rhonchi, symmetric air entry  Heart - normal rate, regular rhythm, normal S1, S2, no murmurs, rubs, clicks or gallops  Abdomen - soft, nontender, nondistended, no masses or organomegaly  Neurological -patient intubated sedated, pupils equal reactive to light  Extremities - peripheral pulses normal, no pedal edema, no clubbing or cyanosis  Skin - normal coloration and turgor, no rashes, no suspicious skin lesions noted   Neuro: sedated, proned, paralyzed     Any additional physical findings:    MEDICATIONS:    Scheduled Meds:   Refresh Lacri-Lube   Ophthalmic Q6H    insulin glargine  50 Units SubCUTAneous Nightly    insulin lispro  0-30 Units SubCUTAneous Q4H    pantoprazole  40 mg IntraVENous Every Other Day    And    sodium chloride (PF)  10 mL IntraVENous Every Other Day    chlorhexidine  15 mL Mouth/Throat BID    albuterol  2.5 mg Nebulization Q4H WA    dexamethasone  6 mg IntraVENous Q6H    amLODIPine  5 mg Oral Daily    losartan  100 mg Oral Daily    And    hydroCHLOROthiazide  25 mg Oral Daily    guaiFENesin-dextromethorphan  5 mL Oral Q6H    sodium chloride flush  5-40 mL IntraVENous 2 times per day    enoxaparin  40 mg SubCUTAneous BID    Vitamin D  2,000 Units Oral Daily    zinc sulfate  50 mg Oral Daily    ascorbic acid  500 mg Oral BID    pravastatin  20 mg Oral Daily    levothyroxine  150 mcg Oral Daily     Continuous Infusions:   fentaNYL 5 mcg/ml in 0.9%  ml infusion 175 mcg/hr (09/14/21 5279)    propofol 10 mcg/kg/min (09/14/21 4066)    cisatracurium (NIMBEX) infusion 2 mcg/kg/min (09/14/21 2023)    sodium chloride      dextrose       PRN Meds:   sodium chloride flush, 5-40 mL, PRN  sodium chloride, 25 mL, PRN  ondansetron, 4 mg, Q8H PRN   Or  ondansetron, 4 mg, Q6H PRN  polyethylene glycol, 17 g, Daily PRN  acetaminophen, 650 mg, Q6H PRN   Or  acetaminophen, 650 mg, Q6H PRN  glucose, 15 g, PRN  dextrose, 12.5 g, PRN  glucagon (rDNA), 1 mg, PRN  dextrose, 100 mL/hr, PRN  sodium chloride, 30 mL, PRN          VENT SETTINGS (Comprehensive) (if applicable):  Vent Information  $Ventilation: $Subsequent Day  Skin Assessment: Clean, dry, & intact  Equipment ID: [id number]  Equipment Changed: (S) Humidification  Vent Type: [id number]  Vent Mode: AC/VC  Vt Ordered: 380 mL  Rate Set: 24 bmp  Peak Flow: 60 L/min  Pressure Support: 0 cmH20  FiO2 : 85 %  SpO2: 90 %  SpO2/FiO2 ratio: 105.88  Sensitivity: 2  PEEP/CPAP: 12  I Time/ I Time %: 0 s  Humidification Source: Heated wire  Humidification Temp: 37  Humidification Temp Measured: 37  Circuit Condensation: Drained  Mask Type: Full face mask  Mask Size: Medium  Additional Respiratory  Assessments  Pulse: 81  Resp: 23  SpO2: 90 %  Humidification Source: Heated wire  Humidification Temp: 37  Circuit Condensation: Drained  Oral Care: Mouth swabbed, Mouth moisturizer  Subglottic Suction Done?: Yes  Cuff Pressure (cm H2O): 29 cm H2O    ABGs:     Laboratory findings:    Complete Blood Count:   Recent Labs     09/12/21 0530 09/13/21 0600 09/14/21  0530   WBC 24.2* 19.9* 28.3*   HGB 12.2 11.4* 12.2   HCT 36.8 34.7 38.2    271 293        Last 3 Blood Glucose:   Recent Labs     09/12/21 0530 09/13/21 0600 09/14/21  0530   GLUCOSE 414* 251* 243*        PT/INR:    Lab Results   Component Value Date    PROTIME 13.1 03/05/2016    INR 1.2 03/05/2016     PTT:    Lab Results   Component Value Date    APTT 32.5 03/05/2016       Comprehensive Metabolic Profile:   Recent Labs     09/12/21 0530 09/13/21 0600 09/14/21  0530    134 134   K 4.5 4.8 5.2*    103 102   CO2 22 23 26   BUN 46* 52* 63*   CREATININE 1.1* 1.1* 1.2*   GLUCOSE 414* 251* 243*   CALCIUM 9.2 9.1 9.3   PROT  --   --  5.5*   LABALBU  --   --  2.6*   BILITOT  --   --  <0.2   ALKPHOS  --   --  82   AST  --   --  40* was seen and evaluated by myself and Dr. Tali Fox MD PGY-2  9/14/2021 10:08 AM    Critical Care Attending Addendum:    Patient seen and examined with the house staff. X-rays personally reviewed through the PACS. Family is updated at the bedside as available. Additional findings listed below as necessary. Additional comments:  1. Unchanged severe acute hypoxic respiratory failure  2. COVID 19 pneumonia  3. Previous possible superinfection treated with levoflox. 4. Uncontrolled DM insulin being adjusted. 5. On TF. 6. Needs to remain sedated and paralyzed due to poor oxygenation as well as prone position. 7. On DVT and GI prophylaxis. 8. Central line is malpositioned but as proned, will leave as is.     30 min CCT

## 2021-09-14 NOTE — PROGRESS NOTES
[address] [place] [address] [facility]  MEHREEN  Progress Note    SUBJECTIVE:  No chief complaint on file. The patient is still in the ICU. She remains sedated, paralyzed and pronated. Review of systems:  As stated above in the chief complaint, otherwise negative. Medications:  Scheduled Meds:   Refresh Lacri-Lube   Ophthalmic Q6H    insulin glargine  50 Units SubCUTAneous Nightly    insulin lispro  0-30 Units SubCUTAneous Q4H    pantoprazole  40 mg IntraVENous Every Other Day    And    sodium chloride (PF)  10 mL IntraVENous Every Other Day    chlorhexidine  15 mL Mouth/Throat BID    albuterol  2.5 mg Nebulization Q4H WA    dexamethasone  6 mg IntraVENous Q6H    amLODIPine  5 mg Oral Daily    losartan  100 mg Oral Daily    And    hydroCHLOROthiazide  25 mg Oral Daily    guaiFENesin-dextromethorphan  5 mL Oral Q6H    sodium chloride flush  5-40 mL IntraVENous 2 times per day    enoxaparin  40 mg SubCUTAneous BID    Vitamin D  2,000 Units Oral Daily    zinc sulfate  50 mg Oral Daily    ascorbic acid  500 mg Oral BID    pravastatin  20 mg Oral Daily    levothyroxine  150 mcg Oral Daily     Continuous Infusions:   fentaNYL 5 mcg/ml in 0.9%  ml infusion 175 mcg/hr (21 5130)    propofol 10 mcg/kg/min (21 7666)    cisatracurium (NIMBEX) infusion 2 mcg/kg/min (21 1319)    sodium chloride      dextrose       PRN Meds:sodium chloride flush, sodium chloride, ondansetron **OR** ondansetron, polyethylene glycol, acetaminophen **OR** acetaminophen, glucose, dextrose, glucagon (rDNA), dextrose, sodium chloride    OBJECTIVE:  BP (!) 86/51   Pulse 86   Temp 98.6 °F (37 °C) (Bladder)   Resp 23   Ht 5' 8\" (1.727 m)   Wt 212 lb 1.3 oz (96.2 kg)   SpO2 90%   BMI 32.25 kg/m²   Temp  Av.8 °F (37.1 °C)  Min: 98.6 °F (37 °C)  Max: 99 °F (37.2 °C)  Constitutional: The patient is lying in bed in the ICU. She is pronated, sedated and paralyzed. Exam is limited.   FiO2 80%.  PEEP 12. No changes. Skin: Warm and dry. No rashes were noted. HEENT: ET tube. NG tube. Neck: Not examined. Chest: Good breath sounds bilaterally. No crackles. Cardiovascular: Heart sounds rhythmic and regular. Abdomen: Not examined. Extremities: No edema. Lines: Right radial arterial line 9/11/2021. Left IJ TLC 9/11/2021. Pinzon catheter. Laboratory and Tests Review:  Lab Results   Component Value Date    WBC 28.3 (H) 09/14/2021    WBC 19.9 (H) 09/13/2021    WBC 24.2 (H) 09/12/2021    HGB 12.2 09/14/2021    HCT 38.2 09/14/2021    MCV 95.5 09/14/2021     09/14/2021     Lab Results   Component Value Date    NEUTROABS 16.84 (H) 09/13/2021    NEUTROABS 21.10 (H) 09/12/2021    NEUTROABS 12.99 (H) 09/11/2021     No results found for: CRPHS  Lab Results   Component Value Date    ALT 58 (H) 09/14/2021    AST 40 (H) 09/14/2021    ALKPHOS 82 09/14/2021    BILITOT <0.2 09/14/2021     Lab Results   Component Value Date     09/14/2021    K 5.2 09/14/2021    K 4.4 08/31/2021     09/14/2021    CO2 26 09/14/2021    BUN 63 09/14/2021    CREATININE 1.2 09/14/2021    CREATININE 1.1 09/13/2021    CREATININE 1.1 09/12/2021    GFRAA 53 09/14/2021    LABGLOM 44 09/14/2021    GLUCOSE 243 09/14/2021    PROT 5.5 09/14/2021    LABALBU 2.6 09/14/2021    CALCIUM 9.3 09/14/2021    BILITOT <0.2 09/14/2021    ALKPHOS 82 09/14/2021    AST 40 09/14/2021    ALT 58 09/14/2021     Lab Results   Component Value Date    CRP 11.8 (H) 09/14/2021    CRP 4.0 (H) 09/13/2021    CRP 1.4 (H) 09/12/2021     No results found for: Amos Gupta  Radiology:      Microbiology:   Nares screen MRSA: Negative  Blood cultures 8/31/2021: Negative  Streptococcus pneumoniae/Legionella urine Ag: negative   Respiratory culture 9/11/2021: Pending    ASSESSMENT:  · SARS-CoV-2 infection with severe pneumonia. Completed course of Remdesivir  · Acute respiratory failure  · Leukocytosis.   She is on steroids  · Possible superimposed bacterial pneumonia, treated with Levofloxacin    PLAN:  · Continue supportive treatment  · IL-6 or John inhibitors are not indicated at this point  · Antibiotics are not warranted at this point  · We will follow closely with you     Hipolito Toscano MD  9:47 AM  9/14/2021

## 2021-09-15 NOTE — PROGRESS NOTES
°F (37.1 °C)  Min: 98.4 °F (36.9 °C)  Max: 99 °F (37.2 °C)  Constitutional: The patient is lying in bed in the ICU. She is supine, sedated and paralyzed. FiO2 100%. PEEP 12. Skin: Warm and dry. No rashes were noted. HEENT: Nonreactive pupils. No thrush. ET tube. NG tube. Neck: Not examined. Chest: Good breath sounds bilaterally. No crackles. Cardiovascular: Heart sounds rhythmic and regular. Abdomen: Round, soft. Positive bowel sounds. Extremities: No edema. Lines: Right radial arterial line 9/11/2021. Left IJ TLC 9/11/2021. Pinzon catheter.     Laboratory and Tests Review:  Lab Results   Component Value Date    WBC 30.1 (H) 09/15/2021    WBC 28.3 (H) 09/14/2021    WBC 19.9 (H) 09/13/2021    HGB 12.3 09/15/2021    HCT 37.8 09/15/2021    MCV 94.7 09/15/2021     09/15/2021     Lab Results   Component Value Date    NEUTROABS 26.38 (H) 09/15/2021    NEUTROABS 24.37 (H) 09/14/2021    NEUTROABS 16.84 (H) 09/13/2021     No results found for: RUST  Lab Results   Component Value Date    ALT 61 (H) 09/15/2021    AST 24 09/15/2021    ALKPHOS 77 09/15/2021    BILITOT 0.3 09/15/2021     Lab Results   Component Value Date     09/15/2021    K 5.6 09/15/2021    K 4.4 08/31/2021     09/15/2021    CO2 24 09/15/2021    BUN 72 09/15/2021    CREATININE 1.2 09/15/2021    CREATININE 1.2 09/14/2021    CREATININE 1.1 09/13/2021    GFRAA 53 09/15/2021    LABGLOM 44 09/15/2021    GLUCOSE 276 09/15/2021    PROT 6.0 09/15/2021    LABALBU 2.7 09/15/2021    CALCIUM 9.4 09/15/2021    BILITOT 0.3 09/15/2021    ALKPHOS 77 09/15/2021    AST 24 09/15/2021    ALT 61 09/15/2021     Lab Results   Component Value Date    CRP 13.1 (H) 09/15/2021    CRP 11.8 (H) 09/14/2021    CRP 4.0 (H) 09/13/2021     No results found for: [address]  Radiology:      Microbiology:   Nares screen MRSA: Negative  Blood cultures 8/31/2021: Negative  Streptococcus pneumoniae/Legionella urine Ag: negative   Respiratory culture 9/11/2021: Pending    ASSESSMENT:  · SARS-CoV-2 infection with severe pneumonia. Completed course of Remdesivir  · Acute respiratory failure  · Leukocytosis. She is on steroids  · Possible superimposed bacterial pneumonia, treated with Levofloxacin    PLAN:  · Continue supportive treatment  · Given rising CRP, we will give her a trial of Baricitinib  · Ivermectin has been requested by family. It has not been proven to be useful in SARS-CoV-2 in randomized control trials. I have had plenty of experience with this drug in several months. I had used it early on in the infection and have seen mixed results.   I have not seen any positive results when the drug is used later in the disease as is this case, therefore I will not prescribe it  · Antibiotics are not warranted at this point  · We will follow closely with you     Discussed with Dr. Héctor Byrd and ICU team    Billy Quiros MD  12:48 PM  9/15/2021

## 2021-09-15 NOTE — PROGRESS NOTES
insulin glargine  35 Units SubCUTAneous BID    baricitinib  2 mg Oral Daily    pantoprazole  40 mg IntraVENous Daily    And    sodium chloride (PF)  10 mL IntraVENous Daily    Refresh Lacri-Lube   Ophthalmic Q6H    insulin lispro  0-30 Units SubCUTAneous Q4H    chlorhexidine  15 mL Mouth/Throat BID    albuterol  2.5 mg Nebulization Q4H WA    dexamethasone  6 mg IntraVENous Q6H    [Held by provider] amLODIPine  5 mg Oral Daily    [Held by provider] losartan  100 mg Oral Daily    And    [Held by provider] hydroCHLOROthiazide  25 mg Oral Daily    guaiFENesin-dextromethorphan  5 mL Oral Q6H    sodium chloride flush  5-40 mL IntraVENous 2 times per day    Vitamin D  2,000 Units Oral Daily    zinc sulfate  50 mg Oral Daily    ascorbic acid  500 mg Oral BID    pravastatin  20 mg Oral Daily    levothyroxine  150 mcg Oral Daily     PRN Meds:   sodium chloride flush, 5-40 mL, PRN  sodium chloride, 25 mL, PRN  ondansetron, 4 mg, Q8H PRN   Or  ondansetron, 4 mg, Q6H PRN  polyethylene glycol, 17 g, Daily PRN  acetaminophen, 650 mg, Q6H PRN   Or  acetaminophen, 650 mg, Q6H PRN  glucose, 15 g, PRN  dextrose, 12.5 g, PRN  glucagon (rDNA), 1 mg, PRN  dextrose, 100 mL/hr, PRN  sodium chloride, 30 mL, PRN      Continuous Infusions:   sodium chloride 50 mL/hr at 09/15/21 1042    norepinephrine Stopped (09/15/21 1239)    fentaNYL 5 mcg/ml in 0.9%  ml infusion 175 mcg/hr (09/15/21 1533)    propofol 10 mcg/kg/min (09/14/21 2251)    cisatracurium (NIMBEX) infusion 2 mcg/kg/min (09/15/21 1926)    sodium chloride      dextrose       OBJECTIVE    BP (!) 113/49   Pulse 88   Temp 99 °F (37.2 °C) (Bladder)   Resp 24   Ht 5' 8\" (1.727 m)   Wt 216 lb 4.3 oz (98.1 kg)   SpO2 (!) 87%   BMI 32.88 kg/m²     Intake/Output Summary (Last 24 hours) at 9/15/2021 1934  Last data filed at 9/15/2021 1456  Gross per 24 hour   Intake 1667.01 ml   Output 1375 ml   Net 292.01 ml       Physical examination:  Pt was seen thru ICU window due to covid   Neck: no obvious neck mass. No obvious neck deformity     CVS/chest Chest is moving with respiration    Extremities:  no visible tremor  Skin: No visible rashes as seen from camera   Musculoskeletal: no visible deformity    Review of Laboratory Data:  I personally reviewed the following labs:   Recent Labs     09/13/21  0600 09/14/21  0530 09/15/21  0535   WBC 19.9* 28.3* 30.1*   RBC 3.70 4.00 3.99   HGB 11.4* 12.2 12.3   HCT 34.7 38.2 37.8   MCV 93.8 95.5 94.7   MCH 30.8 30.5 30.8   MCHC 32.9 31.9* 32.5   RDW 12.6 12.8 13.0    293 301   MPV 10.5 10.6 10.6     Recent Labs     09/14/21  0530 09/15/21  0535 09/15/21  1520    133 135   K 5.2* 5.6* 5.4*    101 104   CO2 26 24 25   BUN 63* 72* 69*   CREATININE 1.2* 1.2* 1.1*   GLUCOSE 243* 276* 153*   CALCIUM 9.3 9.4 8.9   PROT 5.5* 6.0*  --    LABALBU 2.6* 2.7*  --    BILITOT <0.2 0.3  --    ALKPHOS 82 77  --    AST 40* 24  --    ALT 58* 61*  --      No results found for: BHYDRXBUT  Lab Results   Component Value Date    LABA1C 7.4 08/31/2021    LABA1C 6.6 11/19/2013    LABA1C 6.6 08/27/2013     Lab Results   Component Value Date/Time    TSH 1.220 03/04/2016 10:26 AM     Lab Results   Component Value Date    LABA1C 7.4 08/31/2021    GLUCOSE 153 09/15/2021     No results found for: TRIG, HDL, LDLCALC, CHOL    Blood culture   Lab Results   Component Value Date    BC 5 Days no growth 08/31/2021    BC 5 Days- no growth 02/25/2020       Radiology:  US DUP UPPER EXTREMITIES BILATERAL VENOUS   Final Result   1. Occlusive thrombus in the bilateral superficial cephalic veins and in the   antecubital fossa. No color flow identified in these regions. 2.  No additional evidence of thrombus in either upper extremity. US DUP LOWER EXTREMITIES BILATERAL VENOUS   Final Result   No evidence of DVT in either lower extremity. XR CHEST PORTABLE   Final Result   1.  Multifocal bilateral airspace disease which is more advanced when compared   with the patient's prior study of 09/11/2021   2. Findings of pneumomediastinum. There is significant subcutaneous   emphysema seen within the soft tissues of the neck. XR CHEST PORTABLE   Final Result   1. 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 2. Progressively decreasing pneumomediastinum, unchanged bilateral pneumonia. XR CHEST PORTABLE   Final Result   1. Decreased pneumomediastinum, there is a small amount of extrapleural gas   in the right apex but no sign of pneumothorax. 2. Little change bilateral airspace consolidation concerning for pneumonia. 3. Lines okay         XR CHEST PORTABLE   Final Result   1. Pneumomediastinum. 2. Question of very small left apical pneumothorax. 3. Unchanged bilateral infiltrates. 4. Status post intubation with ET tube tip located 3.3 cm above gurinder. Findings were sent to Radiology Results [address] at 5:13 a.m. on   09/11/2021 to be communicated to a licensed caregiver. XR CHEST ABDOMEN NG PLACEMENT   Final Result   Nasogastric tube terminates in stomach. XR CHEST PORTABLE   Final Result   Stable bilateral pulmonary airspace opacities. CTA PULMONARY W CONTRAST   Final Result   1. No indication for acute pulmonary emboli. 2.  Bilateral infiltrates that can be related with acute infectious viral   pneumonia. Please correlate clinically. 3.  Some fullness of the collecting system of both kidneys. See above   comments and recommendations. XR CHEST PORTABLE   Final Result   Bilateral hazy opacities are similar to the previous exam and are suspicious   for pneumonia. This appearance can be seen with COVID-19. US DUP LOWER EXTREMITIES BILATERAL VENOUS   Final Result   No evidence of DVT in either lower extremity.              Medical Records/Labs/Images review:   I personally reviewed and summarized previous records   All labs and imaging were reviewed independently     ASSESSMENT & PLAN   Reilly Almonte, a 66 y.o.-old female seen today for inpatient diabetes management     Diabetes Mellitus type 2  · Currently worsened with steroids   · Tube feed was on hold this afternoon   · We recommend the following diabetes regimen   · Lantus 35 Units BID   · Modified High dose sliding scale with increments of 5U  Q4hrs   · Continue glucose check with meals and at bedtime   · Will titrate insulin dose based on the blood glucose trend & insulin requirement    COVID Pneumonia   · Management per critical care team  · Closely monitor glucose while on steroids      Thyroid cancer, s/p total thyroidectomy   · Pathology report not available  · Currently on Levothyroxine 150 mcg daily  · No recent TFT. Pt currently critically sick and on high dose steroids and these will likely affect thyroid lab result. Plan to check TFT once current condition improve     Thank you for allowing us to participate in the care of this patient. Please do not hesitate to contact us with any additional questions. Walter Soria MD  Endocrinologist, [facility]   [address] [facility] [address]   Phone: [phone]  Fax: [phone]  --------------------------------------------  An electronic signature was used to authenticate this note.  Nguyen Thapa MD on 9/15/2021 at 7:34 PM

## 2021-09-15 NOTE — CONSULTS
Palliative Care Department  [phone]  Palliative Care Initial Consult  Provider Sumit Diaz PA-C    Ramy Kaiser  [id number]  Hospital Day: 16    Referring Provider: Pal Sol DO  Palliative Medicine was consulted for assistance with: goals of care and code status    CHIEF COMPLAINT: Cough and fatigue    ASSESSMENT/PLAN:     Pertinent hospital diagnoses:   1. Viral Pneumonia, COVID-19 -  ID following  2. Acute hypoxic respiratory failure - Treatment per ICU team    PALLIATIVE CARE ENCOUNTER  -  Capacity: At this time, Ramy Kaiser, Does Not have capacity for medical decision-making. Capacity is time limited and situation/question specific  - Surrogate decision maker/Legal NOK:    Spouse Cr Forth [phone]  - Outcome of goals of care meeting: await return call, arrange family meeting  - Code Status:   Full  - Advanced directives: None  - Spiritual assessment: To be assessed  - Bereavement and grief: To be determined  - DISPO: per ICU    Referrals to: none today    HPI:   Ramy Kaiser is a 66 y.o. with a past medical history of diabetes mellitus, hypertension, hypothyroidism who presented to the emergency department from home with cough and fatigue. Patient completed workup in the ED and was admitted on 8/30/2021 with diagnosis(ses) of COVID-19, acute respiratory failure with hypoxia. Patient has had a prolonged hospitalization with increasing hypoxia and O2 demands eventually requiring transfer to the ICU and intubation. She is currently intubated, sedated, paralyzed and prone in the ICU.     Discussion held over telephone due to current visitor restriction secondary to [place] pandemic  Details of Conversation:  Call to arrange meeting/discuss with family    Past Medical History:   Diagnosis Date    [facility]     Diabetes mellitus ([facility]     Hypertension     Hypothyroidism     Thyroid disease     Urinary incontinence        Past Surgical History:   Procedure Laterality Date    THYROIDECTOMY      TUBAL LIGATION       Inpatient medications reviewed: yes  Home Medications reviewed: yes  OARRS: 000    No Known Allergies    Family History   Problem Relation Age of Onset    Diabetes Mother     Hypertension Mother     Stroke Mother     Diabetes Father     Hypertension Father     Thyroid Disease Daughter     Down Syndrome Daughter     Diabetes Maternal Grandmother     Hypertension Maternal Grandmother      Social history:   status: no  Marital status:   Living status: with family:  spouse   Work history: unknown  Tobacco use: no  Drug use: no  Alcohol use: no    Review of Systems:  Unable to obtained due to current condition/level of consciousness of patient    OBJECTIVE:   According to institutional recommendations and guidelines, a face-to-face encounter was not performed due to the current efforts to prevent transmission of COVID-19 and the need to preserve PPE for other caregivers. Relevant records, nursing assessment, and diagnostic testing including laboratory results and imaging were reviewed. Please reference any relevant documentation elsewhere. Patient was observed through the window and observation as reported below. Care will be coordinated with the primary services and consultants. Prognosis: Poor  Physical Exam:  BP (!) 121/50   Pulse 85   Temp 98.8 °F (37.1 °C) (Bladder)   Resp 23   Ht 5' 8\" (1.727 m)   Wt 216 lb 4.3 oz (98.1 kg)   SpO2 (!) 89%   BMI 32.88 kg/m²   Gen: sedated, paralyzed and intubated, prone  HEENT: endotracheal tube and OG   Lungs: mechanical ventilation   Heart: Intermittent sinus tachycardia per monitor  Abdomen: last BM 9/9  : peterson catheter   Skin: no wounds documented per nursing assessment  Ext: generalized edema of upper and lower extremities     Neuro: Paralyzed and sedated    Objective data reviewed: labs, images, records, medication use, vitals and chart  Relevant data:    Albumin 2.7  D-dimer 642  creatinine 1.2, BUN 72    Time/Communication  Greater than 50% of time spent, total 0 minutes in counseling and coordination of care at the bedside/over the telephone regarding pending return call and see above. Thank you for allowing Palliative Medicine to participate in the care of [facility]. Provider Yamila Khoury PA-C  Palliative Medicine    Note: This report was completed using [employer] voiced recognition software. Every effort has been made to ensure accuracy; however, inadvertent computerized transcription errors may be present.

## 2021-09-15 NOTE — PROGRESS NOTES
[facility] Progress Note    Admitting Date and Time: 8/30/2021  2:25 PM  Admit Dx: Pneumonia due to COVID-19 virus [U07.1, J12.82]    Subjective:  Patient is being followed for Pneumonia due to COVID-19 virus [U07.1, J12.82]     Case discussed with ICU RN. Patient remains hypoxic despite vent support. Afebrile. Will consult palliative care. ROS: denies fever, chills, cp, sob, n/v, HA unless stated above.       pantoprazole  40 mg IntraVENous Daily    And    sodium chloride (PF)  10 mL IntraVENous Daily    Refresh Lacri-Lube   Ophthalmic Q6H    insulin glargine  50 Units SubCUTAneous Nightly    insulin lispro  0-30 Units SubCUTAneous Q4H    chlorhexidine  15 mL Mouth/Throat BID    albuterol  2.5 mg Nebulization Q4H WA    dexamethasone  6 mg IntraVENous Q6H    amLODIPine  5 mg Oral Daily    losartan  100 mg Oral Daily    And    hydroCHLOROthiazide  25 mg Oral Daily    guaiFENesin-dextromethorphan  5 mL Oral Q6H    sodium chloride flush  5-40 mL IntraVENous 2 times per day    enoxaparin  40 mg SubCUTAneous BID    Vitamin D  2,000 Units Oral Daily    zinc sulfate  50 mg Oral Daily    ascorbic acid  500 mg Oral BID    pravastatin  20 mg Oral Daily    levothyroxine  150 mcg Oral Daily     sodium chloride flush, 5-40 mL, PRN  sodium chloride, 25 mL, PRN  ondansetron, 4 mg, Q8H PRN   Or  ondansetron, 4 mg, Q6H PRN  polyethylene glycol, 17 g, Daily PRN  acetaminophen, 650 mg, Q6H PRN   Or  acetaminophen, 650 mg, Q6H PRN  glucose, 15 g, PRN  dextrose, 12.5 g, PRN  glucagon (rDNA), 1 mg, PRN  dextrose, 100 mL/hr, PRN  sodium chloride, 30 mL, PRN         Objective:    BP (!) 110/48   Pulse 74   Temp 98.4 °F (36.9 °C) (Bladder)   Resp 23   Ht 5' 8\" (1.727 m)   Wt 216 lb 4.3 oz (98.1 kg)   SpO2 (!) 88%   BMI 32.88 kg/m²     General Appearance: alert and oriented to person, place and time and in no acute distress  Skin: warm and dry  Head: normocephalic and atraumatic  Eyes: pupils failure secondary to COVID-19 pneumonia and pneumomediastinum status post mechanical ventilation - Patient is currently on fentanyl, nimbex as well as propofol for sedation. Will check procalcitonin to rule out superimposed bacterial infection. Follow up with pulm recommendations. 3. Ventilator related Pneumomediastinum - Reduce ventilatory pressures and observe. Should be self resolving. 4.  Diabetes mellitus type 2, hyperglycemia - Increase Lantus to 35 units bid and continue lispro every 4 hours, high-dose. We will continue to monitor blood sugars closely.  Endocrinology on board. 5.  Hypertension -  Patient is on Norvasc, losartan, hydrochlorothiazide however there are some episodes of hypotension and so will place holding parameters. 5.  Hypothyroidism - Continue Synthroid. Repeat thyroid panel once acute illness resolved and off of steroids   6. HLD - Continue pravastatin  7.  DVT prophylaxis - Lovenox   8. Nutrition - TF via OG    Will consult palliative care. NOTE: This report was transcribed using voice recognition software. Every effort was made to ensure accuracy; however, inadvertent computerized transcription errors may be present. Electronically signed by Liya Medrano DO on 9/15/2021 at 8:28 AM     Due to the patient's Covid 19+ status, to reduce exposure, contamination, and an effort to conserve PPE, this patient was evaluated with combination of review of the medical record, nursing assessments, other physicians exam and assessments, as well as telemedicine interaction.

## 2021-09-15 NOTE — PROGRESS NOTES
Orders Provides: 720 ml/d, 1080 hollie, 59 g pro, 726 ml total free water      Anthropometric Measures:  · Height: 5' 8\" (172.7 cm)  · Current Body Weight: 216 lb (98 kg) (9/15 bedscale)   · Admission Body Weight: 210 lb (95.3 kg) (9/11 bedscale)    · Usual Body Weight:  (not available)     · Ideal Body Weight: 140 lbs; % Ideal Body Weight 154.3 %   · BMI: 32.9  · BMI Categories: Obese Class 1 (BMI 30.0-34. 9)       Nutrition Diagnosis:   · Inadequate oral intake related to impaired respiratory function as evidenced by intubation, nutrition support - enteral nutrition, NPO or clear liquid status due to medical condition      Nutrition Interventions:   Food and/or Nutrient Delivery:  Continue NPO (When pt adequately stablized, recommend resume EN, as brayan)  Nutrition Education/Counseling:  No recommendation at this time   Coordination of Nutrition Care:  Continue to monitor while inpatient    Goals:  Pt brayan EN at goal rate       Nutrition Monitoring and Evaluation:   Behavioral-Environmental Outcomes:  None Identified   Food/Nutrient Intake Outcomes:  Enteral Nutrition Intake/Tolerance  Physical Signs/Symptoms Outcomes:  GI Status, Hemodynamic Status, Biochemical Data, Fluid Status or Edema, Nutrition Focused Physical Findings, Skin, Weight     Discharge Planning:     Too soon to determine     Electronically signed by Emanuel Proctor RD, CNSC, LD on 9/15/21 at 1:29 PM EDT    Contact: [phone]

## 2021-09-15 NOTE — PROGRESS NOTES
.  Intensive Care Daily Quality Rounding Checklist      ICU Team Members: Dr. Tom Chua, Dr. Aldo Herr (resident), clinical pharmacist, pharmacist students, bedside nurse, charge nurse, respiratory therapy    ICU Day #: NUMBER 8    Intubation Date: September 11,2021    Ventilator Day #: NUMBER: 5    Central Line Insertion Date: September 11,2021        Day #: NUMBER: 5     Arterial Line Insertion Date: September 14, 2021 new arterial line placed      Day #: NUMBER: 2    Temporary Hemodialysis Catheter Insertion Date:  N/A      Day # N/A    DVT Prophylaxis: Lovenox    GI Prophylaxis: Protonix    Pinzon Catheter Insertion Date: September 8,2021       Day #: 8      Continued need (if yes, reason documented and discussed with physician): yes, strict I&O in critical patient    Skin Issues/ Wounds and ordered treatment discussed on rounds: no issues/ SOS precautions    Goals/ Plans for the Day: monitor labs and vitals,wean paralytic as able, wean Levophed as able,continue critical care management, US extremities to R/O DVT and start NS @ 50 cc/hr

## 2021-09-15 NOTE — PROGRESS NOTES
Critical Care Team - Daily Progress Note      Date and time: 9/15/2021 10:16 AM  Patient's name:  Brian Aguirre  Medical Record Number: [id number]  Patient's account/billing number: [de-identified]  Patient's YOB: 1950  Age: 66 y.o. Date of Admission: 2021  2:25 PM  Length of stay during current admission: 16      Primary Care Physician: Enoc Barrera MD  ICU Attending Physician: Dr. Hartley Nine    Code Status: Full Code    Reason for ICU admission: Acute respiratory failure with hypoxia COVID-19      SUBJECTIVE:     OVERNIGHT EVENTS:       No acute events overnight for the patient    9/10: Acute respiratory distress overnight on BiPAP and she required intubation. : Patient overnight had increasing respiratory distress, was intubated by anesthesia. Patient sedation minimal and patient was awake, will increase sedation    : patient remains prone, FiO2 to 80%     no acute events overnight patient remains prone at this time. : No acute events overnight per overnight nursing staff patient's not required any ventilation changes at this time and saturating well. 9/15 patient laying flat supine saturations 87-88%.       OBJECTIVE:     VITAL SIGNS:  BP (!) 121/50   Pulse 85   Temp 98.8 °F (37.1 °C) (Bladder)   Resp 23   Ht 5' 8\" (1.727 m)   Wt 216 lb 4.3 oz (98.1 kg)   SpO2 (!) 89%   BMI 32.88 kg/m²   Tmax over 24 hours:  Temp (24hrs), Av.6 °F (37 °C), Min:97.7 °F (36.5 °C), Max:99 °F (37.2 °C)      Patient Vitals for the past 6 hrs:   BP Temp Temp src Pulse Resp SpO2 Weight   09/15/21 0945 -- -- -- 85 23 (!) 89 % --   09/15/21 0930 -- -- -- 80 24 (!) 89 % --   09/15/21 0915 -- -- -- 77 24 (!) 89 % --   09/15/21 0910 -- -- -- 77 24 (!) 89 % --   09/15/21 0900 -- -- -- 78 23 (!) 89 % --   09/15/21 0835 (!) 121/50 98.8 °F (37.1 °C) Bladder 76 24 97 % --   09/15/21 0833 -- -- -- 75 24 (!) 86 % --   09/15/21 0500 -- -- -- 74 23 (!) 88 % 216 lb 4.3 oz (98.1 kg)   09/15/21 6544 -- -- -- 74 24 (!) 87 % --         Intake/Output Summary (Last 24 hours) at 9/15/2021 1016  Last data filed at 9/15/2021 0500  Gross per 24 hour   Intake 2527.01 ml   Output 1300 ml   Net 1227.01 ml     Wt Readings from Last 2 Encounters:   09/15/21 216 lb 4.3 oz (98.1 kg)   08/30/21 213 lb (96.6 kg)     Body mass index is 32.88 kg/m².         PHYSICAL EXAMINATION:    General appearance -intubated sedated no distreess  Mental status - sedated, no history available  Eyes - pupils equal and reactive   Chest - coarse bs to auscultation, no wheezes, rales or rhonchi, symmetric air entry  Heart - normal rate, regular rhythm, normal S1, S2, no murmurs, rubs, clicks or gallops  Abdomen - soft, nontender, nondistended, no masses or organomegaly  Neurological -patient intubated sedated, pupils equal reactive to light  Extremities - peripheral pulses normal, no pedal edema, no clubbing or cyanosis  Skin - normal coloration and turgor, no rashes, no suspicious skin lesions noted   Neuro: sedated, proned, paralyzed     Any additional physical findings:    MEDICATIONS:    Scheduled Meds:   enoxaparin  50 mg SubCUTAneous BID    insulin glargine  35 Units SubCUTAneous BID    pantoprazole  40 mg IntraVENous Daily    And    sodium chloride (PF)  10 mL IntraVENous Daily    Refresh Lacri-Lube   Ophthalmic Q6H    insulin lispro  0-30 Units SubCUTAneous Q4H    chlorhexidine  15 mL Mouth/Throat BID    albuterol  2.5 mg Nebulization Q4H WA    dexamethasone  6 mg IntraVENous Q6H    amLODIPine  5 mg Oral Daily    [Held by provider] losartan  100 mg Oral Daily    And    hydroCHLOROthiazide  25 mg Oral Daily    guaiFENesin-dextromethorphan  5 mL Oral Q6H    sodium chloride flush  5-40 mL IntraVENous 2 times per day    Vitamin D  2,000 Units Oral Daily    zinc sulfate  50 mg Oral Daily    ascorbic acid  500 mg Oral BID    pravastatin  20 mg Oral Daily    levothyroxine  150 mcg Oral Daily     Continuous Infusions:   norepinephrine 18 mcg/min (09/15/21 1009)    fentaNYL 5 mcg/ml in 0.9%  ml infusion 175 mcg/hr (09/15/21 0647)    propofol 10 mcg/kg/min (09/14/21 2251)    cisatracurium (NIMBEX) infusion 2 mcg/kg/min (09/14/21 2210)    sodium chloride      dextrose       PRN Meds:   sodium chloride flush, 5-40 mL, PRN  sodium chloride, 25 mL, PRN  ondansetron, 4 mg, Q8H PRN   Or  ondansetron, 4 mg, Q6H PRN  polyethylene glycol, 17 g, Daily PRN  acetaminophen, 650 mg, Q6H PRN   Or  acetaminophen, 650 mg, Q6H PRN  glucose, 15 g, PRN  dextrose, 12.5 g, PRN  glucagon (rDNA), 1 mg, PRN  dextrose, 100 mL/hr, PRN  sodium chloride, 30 mL, PRN          VENT SETTINGS (Comprehensive) (if applicable):  Vent Information  $Ventilation: $Subsequent Day  Skin Assessment: Clean, dry, & intact  Equipment ID: [id number]  Equipment Changed: (S) Humidification  Vent Type: [id number]  Vent Mode: AC/PC  Vt Ordered: 380 mL  Rate Set: 24 bmp  Peak Flow: 60 L/min  Pressure Support: 0 cmH20  FiO2 : 100 %  SpO2: (!) 89 %  SpO2/FiO2 ratio: 89  PaO2/FiO2 ratio: 0.62  Sensitivity: 2  PEEP/CPAP: 12  I Time/ I Time %: 0 s  Humidification Source: Heated wire  Humidification Temp: 37  Humidification Temp Measured: 37  Circuit Condensation: Drained  Mask Type: Full face mask  Mask Size: Medium  Additional Respiratory  Assessments  Pulse: 85  Resp: 23  SpO2: (!) 89 %  Position: Supine  Humidification Source: Heated wire  Humidification Temp: 37  Circuit Condensation: Drained  Oral Care: Mouth swabbed, Mouth moisturizer, Mouth suctioned  Subglottic Suction Done?: Yes  Cuff Pressure (cm H2O): 29 cm H2O    ABGs:     Laboratory findings:    Complete Blood Count:   Recent Labs     09/13/21  0600 09/14/21  0530 09/15/21  0535   WBC 19.9* 28.3* 30.1*   HGB 11.4* 12.2 12.3   HCT 34.7 38.2 37.8    293 301        Last 3 Blood Glucose:   Recent Labs     09/13/21  0600 09/14/21  0530 09/15/21  0535   GLUCOSE 251* 243* 276*        PT/INR:    Lab Results   Component Value Date    PROTIME 13.1 03/05/2016    INR 1.2 03/05/2016     PTT:    Lab Results   Component Value Date    APTT 32.5 03/05/2016       Comprehensive Metabolic Profile:   Recent Labs     09/13/21  0600 09/14/21  0530 09/14/21  0530 09/15/21  0535    134  --  133   K 4.8 5.2*  --  5.6*    102  --  101   CO2 23 26  --  24   BUN 52* 63*  --  72*   CREATININE 1.1* 1.2*  --  1.2*   GLUCOSE 251* 243*  --  276*   CALCIUM 9.1 9.3  --  9.4   PROT  --  5.5*   < > 6.0*   LABALBU  --  2.6*   < > 2.7*   BILITOT  --  <0.2   < > 0.3   ALKPHOS  --  82   < > 77   AST  --  40*   < > 24   ALT  --  58*   < > 61*    < > = values in this interval not displayed. Magnesium:   Lab Results   Component Value Date    MG 2.9 09/15/2021     Phosphorus:   Lab Results   Component Value Date    PHOS 2.6 09/15/2021     Ionized Calcium: No results found for: CAION     Urinalysis:     Troponin: No results for input(s): TROPONINI in the last 72 hours.       ASSESSMENT:     Patient Active Problem List    Diagnosis Date Noted    Sepsis ([facility] 03/06/2016    E. coli septicemia ([facility] [facility] 03/06/2016    Acute cystitis 03/06/2016    Type 2 diabetes mellitus without complication ([facility] [facility] 89/59/0052    Hypertension 03/06/2016    Pneumonia due to COVID-19 virus 08/30/2021    Acute respiratory failure with hypoxia (HCC)     Elevated LFTs     Uncontrolled type 2 diabetes mellitus with hyperglycemia ([facility]     UTI due to extended-spectrum beta lactamase (ESBL) producing Escherichia coli 12/05/2018     SYSTEMS ASSESSMENT    Neuro     Sedated and paralyzed  Anxiety  Propofol fentanyl for sedation we will check triglyceride CPK today  Paralytic on board  Continue to monitor    Respiratory     Acute hypoxic respiratory failure secondary to COVID-19  COVID-19 pneumonia  Decadron 6 mg every 6 hours  Albuterol every 4 hours while awake  Baricitinib    Cardiovascular     History of hypertension  Amlodipine-5 mg  Losartan 100 mg,   hydrochlorothiazide 25 mg daily   All held  Gastrointestinal   Prophylaxis  Continue monitor    Renal   Function stable urine output stable  Continue monitor     Infectious Disease   COVID-19 pneumonia  Infectious disease following  Trend inflammatory markers  Continue monitor  Baricitinib  Hematology/Oncology     Hemoglobin stable  Replace if less than 7   Lovenox 50 mg twice daily  Will ultrasound patient's limbs due to elevation of her  D-dimer    Endocrine   DM on Lantus-50 units  High-dose sliding scale  Hypothyroidism-Synthroid 150 MCG  continue to monitor    Social/Spiritual/DNR/Other     Full code  Vitamin protocol        the patient was seen and evaluated by myself and Dr. Carrillo Fleming MD PGY-2  9/15/2021 10:16 AM    Critical Care Attending Addendum:    Patient seen and examined with the house staff. X-rays personally reviewed through the PACS. Family is updated at the bedside as available. Additional findings listed below as necessary. Additional comments:  1. Now hypotensive on norepi which is weaned down. 2. Acute hypoxic respiratory failure from COVID on 100% with SaO2 of 90 on abg.  3. COVID 19 pneumonia added baricitinib. 4. Uncontrolled dm will further adjust insulin. 5. With worsening hypoxia, will do is of all 4 limbs. 6. Family left before I could give update.       30 min cct

## 2021-09-15 NOTE — PLAN OF CARE
Problem: Airway Clearance - Ineffective  Goal: Achieve or maintain patent airway  9/15/2021 0137 by Ronald Maloney RN  Outcome: Met This Shift  9/14/2021 1221 by Graylin Epley, RN  Outcome: Met This Shift     Problem: Gas Exchange - Impaired  Goal: Absence of hypoxia  9/15/2021 0137 by Ronald Maloney RN  Outcome: Met This Shift  9/14/2021 1221 by Graylin Epley, RN  Outcome: Ongoing  Goal: Promote optimal lung function  9/15/2021 0137 by Ronald Maloney RN  Outcome: Met This Shift  9/14/2021 1221 by Graylin Epley, RN  Outcome: Ongoing     Problem: Breathing Pattern - Ineffective  Goal: Ability to achieve and maintain a regular respiratory rate  9/15/2021 0137 by Ronald Maloney RN  Outcome: Met This Shift  9/14/2021 1221 by Graylin Epley, RN  Outcome: Met This Shift     Problem:  Body Temperature -  Risk of, Imbalanced  Goal: Ability to maintain a body temperature within defined limits  9/15/2021 0137 by Ronald Maloney RN  Outcome: Met This Shift  9/14/2021 1221 by Graylin Epley, RN  Outcome: Met This Shift  Goal: Will regain or maintain usual level of consciousness  9/15/2021 0137 by Ronald Maloney RN  Outcome: Met This Shift  9/14/2021 1221 by Graylin Epley, RN  Outcome: Not Met This Shift  Goal: Complications related to the disease process, condition or treatment will be avoided or minimized  9/15/2021 0137 by Ronald Maloney RN  Outcome: Met This Shift  9/14/2021 1221 by Graylin Epley, RN  Outcome: Met This Shift     Problem: Isolation Precautions - Risk of Spread of Infection  Goal: Prevent transmission of infection  9/15/2021 0137 by Ronald Maloney RN  Outcome: Met This Shift  9/14/2021 1221 by Graylin Epley, RN  Outcome: Met This Shift     Problem: Nutrition Deficits  Goal: Optimize nutritional status  9/15/2021 0137 by Ronald Maloney RN  Outcome: Met This Shift  9/14/2021 1221 by Graylin Epley, RN  Outcome: Met This Shift     Problem: Risk for Fluid Volume Deficit  Goal: Maintain normal heart rhythm  9/15/2021 0137 by Imani Braden RN  Outcome: Met This Shift  9/14/2021 1221 by Nadine Borges RN  Outcome: Met This Shift  Goal: Maintain absence of muscle cramping  9/15/2021 0137 by Imani Braden RN  Outcome: Met This Shift  9/14/2021 1221 by Nadine Borges RN  Outcome: Met This Shift  Goal: Maintain normal serum potassium, sodium, calcium, phosphorus, and pH  9/15/2021 0137 by Imani Braden RN  Outcome: Met This Shift  9/14/2021 1221 by Nadine Borges RN  Outcome: Ongoing     Problem: Loneliness or Risk for Loneliness  Goal: Demonstrate positive use of time alone when socialization is not possible  9/15/2021 0137 by Imani Braden RN  Outcome: Met This Shift  9/14/2021 1221 by Nadine Borges RN  Outcome: Not Met This Shift     Problem: Fatigue  Goal: Verbalize increase energy and improved vitality  9/15/2021 0137 by Imani Braden RN  Outcome: Met This Shift  9/14/2021 1221 by Nadine Borges RN  Outcome: Not Met This Shift     Problem: Patient Education: Go to Patient Education Activity  Goal: Patient/Family Education  9/15/2021 0137 by Imani Braden RN  Outcome: Met This Shift  9/14/2021 1221 by Nadine Borges RN  Outcome: Ongoing     Problem: Pain:  Goal: Pain level will decrease  Description: Pain level will decrease  9/15/2021 0137 by Imani Braden RN  Outcome: Met This Shift  9/14/2021 1221 by Nadine Borges RN  Outcome: Met This Shift  Goal: Control of acute pain  Description: Control of acute pain  9/15/2021 0137 by Imani Braden RN  Outcome: Met This Shift  9/14/2021 1221 by Nadine Borges RN  Outcome: Met This Shift  Goal: Control of chronic pain  Description: Control of chronic pain  9/15/2021 0137 by Imani Braden RN  Outcome: Met This Shift  9/14/2021 1221 by Nadine Borges RN  Outcome: Met This Shift     Problem: Skin Integrity:  Goal: Will show no infection signs and symptoms  Description: Will show no infection signs and symptoms  9/15/2021 0137 by Estuardo Hernandez RN  Outcome: Met This Shift  9/14/2021 1221 by Tiffany Llanes RN  Outcome: Met This Shift  Goal: Absence of new skin breakdown  Description: Absence of new skin breakdown  9/15/2021 0137 by Estuardo Hernandez RN  Outcome: Met This Shift  9/14/2021 1221 by Tiffany Llanes RN  Outcome: Met This Shift

## 2021-09-16 NOTE — PROGRESS NOTES
finished a course of levaquin , appreciate pulmonary input, CTA was negative for PE  4. Elevated transaminases, most likely due to Covid infection, improved continue monitoring. 5.  History of diabetes type 2, blood glucose is not controlled due to steroids usually on Trulicity, Metformin and glimepiride, those were placed on hold, continue sliding scale insulin,on lantus to 35 units and lispro very high-dose sliding scale per endocrinology  6. History of hypertension, blood pressure is acceptable, antihypertensive medication were placed on hold, consider restarting. .  7.  History of hypothyroidism, continue levothyroxine      NOTE: This report was transcribed using voice recognition software. Every effort was made to ensure accuracy; however, inadvertent computerized transcription errors may be present.   Electronically signed by Luh Dia MD on 9/16/2021 at 8:37 AM

## 2021-09-16 NOTE — PROGRESS NOTES
Palliative Care Department  [phone]  Palliative Care Progress Note  Provider Heaven Maldonado PA-C    Dolores Lobato  [id number]  Hospital Day: 25    Referring Provider: Jose Rafael Martinez DO  Palliative Medicine was consulted for assistance with: goals of care and code status    CHIEF COMPLAINT: Cough and fatigue  Brief summary  Dolores Lobato is a 66 y.o. with a past medical history of diabetes mellitus, hypertension, hypothyroidism who presented to the emergency department from home with cough and fatigue. ASSESSMENT/PLAN:   Recommendations   Continue care, continue as a full code    pertinent hospital diagnoses:   1. Viral Pneumonia, COVID-19 -  ID following  2. Acute hypoxic respiratory failure - Treatment per ICU team    PALLIATIVE CARE ENCOUNTER  -  Capacity: At this time, Dolores Lobato, Does Not have capacity for medical decision-making. Capacity is time limited and situation/question specific  - Surrogate decision maker/Legal NOK:    Spouse Jossy Gavin [phone]  -Code Status:   Full  - Advanced directives: None  - Spiritual assessment: To be assessed  - Bereavement and grief: To be determined  - DISPO: per ICU    Referrals to: none today    Subjective   Chart reviewed  Ventilator day #6  Patient currently supine  Remains intubated, sedated and paralyzed  Vasopressor stopped    Discussion held over telephone due to current visitor restriction secondary to [address] pandemic  Details of Conversation: Palliative medicine services introduced to the patient's  over the phone. He has been updated from ICU attending Dr. Gerber Kurtz and feels he is receiving good communication from the hospital staff in regards to his wife's condition. His goal for her is to continue care with hope she will recover. He identifies the severity of her condition however wishes to remain hopeful. He wishes to continue her as a full code.   Support provided through active listening, exploration of needs and empathy. OBJECTIVE:   According to institutional recommendations and guidelines, a face-to-face encounter was not performed due to the current efforts to prevent transmission of COVID-19 and the need to preserve PPE for other caregivers. Relevant records, nursing assessment, and diagnostic testing including laboratory results and imaging were reviewed. Please reference any relevant documentation elsewhere. Patient was observed through the window and observation as reported below. Care will be coordinated with the primary services and consultants. Prognosis: Poor  Physical Exam:  BP (!) 152/53   Pulse 90   Temp 98.2 °F (36.8 °C) (Bladder)   Resp 24   Ht 5' 8\" (1.727 m)   Wt 221 lb 5.5 oz (100.4 kg)   SpO2 (!) 84%   BMI 33.65 kg/m²   Gen: sedated, paralyzed and intubated, supine  HEENT: endotracheal tube and OG   Lungs: mechanical ventilation   Heart: regular rate and rhythm per monitor  Abdomen: last BM 9/9  : peterson catheter   Skin: no wounds documented per nursing assessment  Ext: generalized edema of upper and lower extremities     Neuro: Paralyzed and sedated    Objective data reviewed: labs, images, records, medication use, vitals and chart    Time/Communication  Greater than 50% of time spent, total 25 minutes in counseling and coordination of care at the bedside/over the telephone regarding code status, goals of care, diagnosis and prognosis and see above. Thank you for allowing Palliative Medicine to participate in the care of [facility]. Provider Fili Graves PA-C  Palliative Medicine    Note: This report was completed using computerize voiced recognition software. Every effort has been made to ensure accuracy; however, inadvertent computerized transcription errors may be present.

## 2021-09-16 NOTE — PLAN OF CARE
Problem: Airway Clearance - Ineffective  Goal: Achieve or maintain patent airway  9/16/2021 1628 by Marlon Mendoza RN  Outcome: Met This Shift  Problem: Gas Exchange - Impaired  Goal: Absence of hypoxia  9/16/2021 1628 by Marlon Mendoza RN  Outcome: Met This Shift  Goal: Promote optimal lung function  9/16/2021 1628 by Marlon Mendoza RN  Outcome: Met This Shift  Problem: Breathing Pattern - Ineffective  Goal: Ability to achieve and maintain a regular respiratory rate  9/16/2021 1628 by Marlon Mendoza RN  Outcome: Met This Shift  Problem:  Body Temperature -  Risk of, Imbalanced  Goal: Ability to maintain a body temperature within defined limits  9/16/2021 1628 by Marlon Mendoza RN  Outcome: Met This Shift  Goal: Will regain or maintain usual level of consciousness  9/16/2021 1628 by Marlon Mendoza RN  Outcome: Met This Shift  Goal: Complications related to the disease process, condition or treatment will be avoided or minimized  9/16/2021 1628 by Marlon Mendoza RN  Outcome: Met This Shift  Problem: Isolation Precautions - Risk of Spread of Infection  Goal: Prevent transmission of infection  9/16/2021 1628 by Marlon Mendoza RN  Outcome: Met This Shift  Problem: Nutrition Deficits  Goal: Optimize nutritional status  9/16/2021 1628 by Marlon Mendoza RN  Outcome: Met This Shift     Problem: Risk for Fluid Volume Deficit  Goal: Maintain normal heart rhythm  9/16/2021 1628 by Marlon Mendoza RN  Outcome: Met This Shift  Goal: Maintain absence of muscle cramping  9/16/2021 1628 by Marlon Mendoza RN  Outcome: Met This Shift  Goal: Maintain normal serum potassium, sodium, calcium, phosphorus, and pH  9/16/2021 1628 by Marlon Mendoza RN  Outcome: Met This Shift     Problem: Loneliness or Risk for Loneliness  Goal: Demonstrate positive use of time alone when socialization is not possible  9/16/2021 1628 by Marlon Mendoza RN  Outcome: Met This Shift  Problem: Fatigue  Goal: Verbalize increase energy and improved vitality  9/16/2021 1628 by Rosa Maria Whipple RN  Outcome: Met This Shift  Problem: Patient Education: Go to Patient Education Activity  Goal: Patient/Family Education  9/16/2021 1628 by Rosa Maria Whipple RN  Outcome: Met This Shift  Problem: Pain:  Goal: Pain level will decrease  Description: Pain level will decrease  9/16/2021 1628 by Rosa Maria Whipple RN  Outcome: Met This Shift  Goal: Control of acute pain  Description: Control of acute pain  9/16/2021 1628 by Rosa Maria Whipple RN  Outcome: Met This Shift  Goal: Control of chronic pain  Description: Control of chronic pain  9/16/2021 1628 by Rosa Maria Whipple RN  Outcome: Met This Shift  Problem: Skin Integrity:  Goal: Will show no infection signs and symptoms  Description: Will show no infection signs and symptoms  9/16/2021 1628 by Rosa Maria Whipple RN  Outcome: Met This Shift  Goal: Absence of new skin breakdown  Description: Absence of new skin breakdown  9/16/2021 1628 by Rosa Maria Whipple RN  Outcome: Met This Shift

## 2021-09-16 NOTE — PATIENT CARE CONFERENCE
Intensive Care Daily Quality Rounding Checklist        ICU Team Members: bedside nurse, charge nurse, RT, clinical pharmacist,      ICU Day #: NUMBER 9     Intubation Date: September 11,2021     Ventilator Day #: NUMBER: 6     Central Line Insertion Date: September 11,2021                                                    Day #: NUMBER: 6      Arterial Line Insertion Date: September 14, 2021 new arterial line placed                             Day #: NUMBER: 3     Temporary Hemodialysis Catheter Insertion Date:  N/A                             Day # N/A     DVT Prophylaxis: Lovenox    GI Prophylaxis: Protonix     Pinzon Catheter Insertion Date: September 8,2021                                        Day #: 9                             Continued need (if yes, reason documented and discussed with physician): yes, accurate intake and output in a critically ill patient.     Skin Issues/ Wounds and ordered treatment discussed on rounds: no issues/ SOS precautions    Goals/Plan of care:  Vent management, labs,wean oxygen as tolerated,lokelma for high potassium

## 2021-09-16 NOTE — PROGRESS NOTES
Unable to turn and reposition patient as patient does not tolerate being turned at this time. Will continue to monitor and assess throughout the shift.

## 2021-09-16 NOTE — PROGRESS NOTES
Bladder 85 24 (!) 87 % --   09/16/21 0700 (!) 155/61 -- -- 83 24 (!) 87 % --   09/16/21 0600 (!) 155/61 98.6 °F (37 °C) Bladder 83 23 (!) 89 % 221 lb 5.5 oz (100.4 kg)         Intake/Output Summary (Last 24 hours) at 9/16/2021 1154  Last data filed at 9/16/2021 1000  Gross per 24 hour   Intake 3317 ml   Output 1625 ml   Net 1692 ml     Wt Readings from Last 2 Encounters:   09/16/21 221 lb 5.5 oz (100.4 kg)   08/30/21 213 lb (96.6 kg)     Body mass index is 33.65 kg/m².         PHYSICAL EXAMINATION:    General appearance -intubated sedated no distreess  Mental status - sedated, no history available  Eyes - pupils equal and reactive   Chest - coarse rales right lung to auscultation, no wheezes, rales or rhonchi, symmetric air entry  Heart - normal rate, regular rhythm, normal S1, S2, no murmurs, rubs, clicks or gallops  Abdomen - soft, nontender, nondistended, no masses or organomegaly  Neurological -patient intubated sedated, pupils equal reactive to light  Extremities - peripheral pulses normal, no pedal edema, no clubbing or cyanosis  Skin - normal coloration and turgor, no rashes, no suspicious skin lesions noted   Neuro: sedated,paralyzed     Any additional physical findings:    MEDICATIONS:    Scheduled Meds:   sodium zirconium cyclosilicate  5 g Oral TID    enoxaparin  50 mg SubCUTAneous BID    insulin glargine  35 Units SubCUTAneous BID    baricitinib  2 mg Oral Daily    pantoprazole  40 mg IntraVENous Daily    And    sodium chloride (PF)  10 mL IntraVENous Daily    Refresh Lacri-Lube   Ophthalmic Q6H    insulin lispro  0-30 Units SubCUTAneous Q4H    chlorhexidine  15 mL Mouth/Throat BID    albuterol  2.5 mg Nebulization Q4H WA    dexamethasone  6 mg IntraVENous Q6H    [Held by provider] amLODIPine  5 mg Oral Daily    [Held by provider] losartan  100 mg Oral Daily    And    [Held by provider] hydroCHLOROthiazide  25 mg Oral Daily    guaiFENesin-dextromethorphan  5 mL Oral Q6H    sodium chloride flush  5-40 mL IntraVENous 2 times per day    Vitamin D  2,000 Units Oral Daily    zinc sulfate  50 mg Oral Daily    ascorbic acid  500 mg Oral BID    pravastatin  20 mg Oral Daily    levothyroxine  150 mcg Oral Daily     Continuous Infusions:   sodium chloride 50 mL/hr at 09/16/21 0605    norepinephrine Stopped (09/15/21 1239)    fentaNYL 5 mcg/ml in 0.9%  ml infusion 175 mcg/hr (09/16/21 0604)    propofol 15 mcg/kg/min (09/16/21 0932)    cisatracurium (NIMBEX) infusion 2 mcg/kg/min (09/15/21 1926)    sodium chloride      dextrose       PRN Meds:   sodium chloride flush, 5-40 mL, PRN  sodium chloride, 25 mL, PRN  ondansetron, 4 mg, Q8H PRN   Or  ondansetron, 4 mg, Q6H PRN  polyethylene glycol, 17 g, Daily PRN  acetaminophen, 650 mg, Q6H PRN   Or  acetaminophen, 650 mg, Q6H PRN  glucose, 15 g, PRN  dextrose, 12.5 g, PRN  glucagon (rDNA), 1 mg, PRN  dextrose, 100 mL/hr, PRN  sodium chloride, 30 mL, PRN          VENT SETTINGS (Comprehensive) (if applicable):  Vent Information  $Ventilation: $Subsequent Day  Skin Assessment: Clean, dry, & intact  Equipment ID: [id number]  Equipment Changed: (S) Humidification  Vent Type: [id number]  Vent Mode: AC/VC  Vt Ordered: 380 mL  Rate Set: 24 bmp  Peak Flow: 60 L/min  Pressure Support: 0 cmH20  FiO2 : 100 %  SpO2: (!) 84 %  SpO2/FiO2 ratio: 84  PaO2/FiO2 ratio: 68  Sensitivity: 2  PEEP/CPAP: 12  I Time/ I Time %: 0 s  Humidification Source: Heated wire  Humidification Temp: 37  Humidification Temp Measured: 37  Circuit Condensation: Drained  Mask Type: Full face mask  Mask Size: Medium  Additional Respiratory  Assessments  Pulse: 90  Resp: 24  SpO2: (!) 84 %  Position: Supine  Humidification Source: Heated wire  Humidification Temp: 37  Circuit Condensation: Drained  Oral Care: Mouth swabbed, Mouth suctioned, Suction toothette  Subglottic Suction Done?: Yes  Cuff Pressure (cm H2O): 29 cm H2O    ABGs:     Laboratory findings:    Complete Blood Count:   Recent Labs 09/14/21  0530 09/15/21  0535 09/16/21  0551   WBC 28.3* 30.1* 16.6*   HGB 12.2 12.3 11.1*   HCT 38.2 37.8 35.3    301 220        Last 3 Blood Glucose:   Recent Labs     09/15/21  0535 09/15/21  1520 09/16/21  0551   GLUCOSE 276* 153* 302*        PT/INR:    Lab Results   Component Value Date    PROTIME 13.1 03/05/2016    INR 1.2 03/05/2016     PTT:    Lab Results   Component Value Date    APTT 32.5 03/05/2016       Comprehensive Metabolic Profile:   Recent Labs     09/15/21  0535 09/15/21  0535 09/15/21  1520 09/16/21  0551     --  135 134   K 5.6*  --  5.4* 6.2*     --  104 104   CO2 24  --  25 25   BUN 72*  --  69* 70*   CREATININE 1.2*  --  1.1* 1.0   GLUCOSE 276*  --  153* 302*   CALCIUM 9.4  --  8.9 8.8   PROT 6.0*   < >  --  5.2*   LABALBU 2.7*   < >  --  2.3*   BILITOT 0.3   < >  --  <0.2   ALKPHOS 77   < >  --  89   AST 24   < >  --  67*   ALT 61*   < >  --  84*    < > = values in this interval not displayed. Magnesium:   Lab Results   Component Value Date    MG 2.9 09/16/2021     Phosphorus:   Lab Results   Component Value Date    PHOS 3.5 09/16/2021     Ionized Calcium: No results found for: CAION     Urinalysis:     Troponin: No results for input(s): TROPONINI in the last 72 hours.       ASSESSMENT:     Patient Active Problem List    Diagnosis Date Noted    Sepsis ([facility] 03/06/2016    E. coli septicemia ([facility] 03/06/2016    Acute cystitis 03/06/2016    Type 2 diabetes mellitus without complication ([facility] 24/11/2871    Hypertension 03/06/2016    Pneumonia due to COVID-19 virus 08/30/2021    Acute respiratory failure with hypoxia (HCC)     Elevated LFTs     Uncontrolled type 2 diabetes mellitus with hyperglycemia ([address]     UTI due to extended-spectrum beta lactamase (ESBL) producing Escherichia coli 12/05/2018     SYSTEMS ASSESSMENT    Neuro     Sedated and paralyzed  Anxiety  Propofol fentanyl for sedation    Paralytic on board  Continue to monitor    Respiratory     Acute hypoxic respiratory failure secondary to COVID-19  COVID-19 pneumonia  Decadron 6 mg every 6 hours  Albuterol every 4 hours while awake  Baricitinib  Await repeat abg and if truly this hypoxic, will prone and possibly try Pressure control ventilation as plateau is around 29 so not much more room to push peep. Cardiovascular     History of hypertension  Amlodipine-5 mg  Losartan 100 mg,   hydrochlorothiazide 25 mg daily   All held  Gastrointestinal   Prophylaxis  Continue monitor    Renal   Function stable urine output stable but hyperkalemic so treated with [place] with plans to recheck K  Continue monitor     Infectious Disease   COVID-19 pneumonia  Infectious disease following  Trend inflammatory markers  Continue monitor  Baricitinib  Hematology/Oncology     Hemoglobin stable  Replace if less than 7   Lovenox 50 mg twice daily  Has superficial clot in antecubital fossa and cephalic vein. Endocrine   DM on Lantus-50 units but suboptimally controlled. Endocrine consulted. High-dose sliding scale  Hypothyroidism-Synthroid 150 MCG  continue to monitor    Social/Spiritual/DNR/Other     Full code  Vitamin protocol       updated in detail by telephone at 1130.   >30 min CCT  9/16/2021 11:54 AM       ADD: Desaturated further with prone positioning and with PCV so put back on volume cycled ventilation. Will now replace in supine position as proning is not working. Again talked prognosis with  and had him dressed to go into her room. >55 min CCT to this point.

## 2021-09-16 NOTE — CARE COORDINATION
Continue ICU. Continue vent support, FiO2-100%. Palliative consult. Remain on paralytic and sedated. Off pressors.  Ivette following-mjo

## 2021-09-16 NOTE — PROGRESS NOTES
PALLIATIVE MEDICINE    Palliative medicine consulted for goals of care, code status discussion. Patient's goals of care have been established. Continue all care. Patient code status has been reviewed and patient elects to be a full    No additional palliative medicine needs identified currently. Palliative medicine will sign-off. Please re-consult our service if additional Palliative medicine needs arise or there is a change in the patients condition. Thank you for the consult.     Xochitl Park PA-C

## 2021-09-16 NOTE — PROGRESS NOTES
[address] [place] [address] [facility]  [facility]  Progress Note    SUBJECTIVE:  No chief complaint on file. Patient remains in the ICU. She is still intubated, sedated and paralyzed. Vasopressors were stopped. No fever. Review of systems:  As stated above in the chief complaint, otherwise negative.     Medications:  Scheduled Meds:   sodium zirconium cyclosilicate  5 g Oral TID    enoxaparin  50 mg SubCUTAneous BID    insulin glargine  35 Units SubCUTAneous BID    baricitinib  2 mg Oral Daily    pantoprazole  40 mg IntraVENous Daily    And    sodium chloride (PF)  10 mL IntraVENous Daily    Refresh Lacri-Lube   Ophthalmic Q6H    insulin lispro  0-30 Units SubCUTAneous Q4H    chlorhexidine  15 mL Mouth/Throat BID    albuterol  2.5 mg Nebulization Q4H WA    dexamethasone  6 mg IntraVENous Q6H    [Held by provider] amLODIPine  5 mg Oral Daily    [Held by provider] losartan  100 mg Oral Daily    And    [Held by provider] hydroCHLOROthiazide  25 mg Oral Daily    guaiFENesin-dextromethorphan  5 mL Oral Q6H    sodium chloride flush  5-40 mL IntraVENous 2 times per day    Vitamin D  2,000 Units Oral Daily    zinc sulfate  50 mg Oral Daily    ascorbic acid  500 mg Oral BID    pravastatin  20 mg Oral Daily    levothyroxine  150 mcg Oral Daily     Continuous Infusions:   sodium chloride 50 mL/hr at 09/16/21 0605    norepinephrine Stopped (09/15/21 1239)    fentaNYL 5 mcg/ml in 0.9%  ml infusion 175 mcg/hr (09/16/21 0604)    propofol 15 mcg/kg/min (09/16/21 0932)    cisatracurium (NIMBEX) infusion 2 mcg/kg/min (09/15/21 1926)    sodium chloride      dextrose       PRN Meds:sodium chloride flush, sodium chloride, ondansetron **OR** ondansetron, polyethylene glycol, acetaminophen **OR** acetaminophen, glucose, dextrose, glucagon (rDNA), dextrose, sodium chloride    OBJECTIVE:  BP (!) 152/53   Pulse 90   Temp 98.2 °F (36.8 °C) (Bladder)   Resp 24   Ht 5' 8\" (1.727 m)   Wt 221 lb 5.5 oz (100.4 kg)   SpO2 (!) 84%   BMI 33.65 kg/m²   Temp  Av.6 °F (37 °C)  Min: 98.2 °F (36.8 °C)  Max: 99 °F (37.2 °C)  Constitutional: The patient is lying in bed in the ICU. She is supine, sedated and paralyzed. FiO2 100%. PEEP 12. No changes. Skin: Warm and dry. No rashes were noted. HEENT: Nonreactive pupils. No thrush. ET tube. NG tube. Neck: Not examined. Chest: Good breath sounds bilaterally. No crackles. Cardiovascular: Heart sounds rhythmic and regular. Abdomen: Round, soft. Positive bowel sounds. Extremities: No edema. Lines: Right radial arterial line 2021. Left IJ TLC 2021. Pinzon catheter.     Laboratory and Tests Review:  Lab Results   Component Value Date    WBC 16.6 (H) 2021    WBC 30.1 (H) 09/15/2021    WBC 28.3 (H) 2021    HGB 11.1 (L) 2021    HCT 35.3 2021    MCV 98.1 2021     2021     Lab Results   Component Value Date    NEUTROABS 14.41 (H) 2021    NEUTROABS 26.38 (H) 09/15/2021    NEUTROABS 24.37 (H) 2021     No results found for: [facility]  Lab Results   Component Value Date    ALT 84 (H) 2021    AST 67 (H) 2021    ALKPHOS 89 2021    BILITOT <0.2 2021     Lab Results   Component Value Date     2021    K 6.2 2021    K 4.4 2021     2021    CO2 25 2021    BUN 70 2021    CREATININE 1.0 2021    CREATININE 1.1 09/15/2021    CREATININE 1.2 09/15/2021    GFRAA >60 2021    LABGLOM 54 2021    GLUCOSE 302 2021    PROT 5.2 2021    LABALBU 2.3 2021    CALCIUM 8.8 2021    BILITOT <0.2 2021    ALKPHOS 89 2021    AST 67 2021    ALT 84 2021     Lab Results   Component Value Date    CRP 8.2 (H) 2021    CRP 13.1 (H) 09/15/2021    CRP 11.8 (H) 2021     No results found for: Lennie Cornejo  Radiology:      Microbiology:   Nares screen MRSA: Negative  Blood cultures 2021: Negative  Streptococcus pneumoniae/Legionella urine Ag: negative   Respiratory culture 9/11/2021: Not obtained  Respiratory culture 9/13/2021: OP hermes reduced    ASSESSMENT:  · SARS-CoV-2 infection with severe pneumonia. Completed course of Remdesivir  · Acute respiratory failure  · Leukocytosis.   She is on steroids  · Possible superimposed bacterial pneumonia, treated with Levofloxacin    PLAN:  · Continue supportive treatment  · Continue Baricitinib, day 2  · Antibiotics are not warranted at this point  · We will follow closely with you     Spoke with Vita Goodpasture, MD  11:25 AM  9/16/2021

## 2021-09-16 NOTE — PROGRESS NOTES
Morgan James updated at bedside with resident. Updated on patient current status. Answered any questions at this time.

## 2021-09-16 NOTE — PLAN OF CARE
Problem: Airway Clearance - Ineffective  Goal: Achieve or maintain patent airway  Outcome: Met This Shift     Problem: Gas Exchange - Impaired  Goal: Absence of hypoxia  Outcome: Met This Shift  Goal: Promote optimal lung function  Outcome: Met This Shift     Problem: Breathing Pattern - Ineffective  Goal: Ability to achieve and maintain a regular respiratory rate  Outcome: Met This Shift     Problem:  Body Temperature -  Risk of, Imbalanced  Goal: Ability to maintain a body temperature within defined limits  Outcome: Met This Shift  Goal: Will regain or maintain usual level of consciousness  Outcome: Met This Shift  Goal: Complications related to the disease process, condition or treatment will be avoided or minimized  Outcome: Met This Shift     Problem: Isolation Precautions - Risk of Spread of Infection  Goal: Prevent transmission of infection  Outcome: Met This Shift     Problem: Nutrition Deficits  Goal: Optimize nutritional status  Outcome: Met This Shift     Problem: Risk for Fluid Volume Deficit  Goal: Maintain normal heart rhythm  Outcome: Met This Shift  Goal: Maintain absence of muscle cramping  Outcome: Met This Shift  Goal: Maintain normal serum potassium, sodium, calcium, phosphorus, and pH  Outcome: Met This Shift     Problem: Loneliness or Risk for Loneliness  Goal: Demonstrate positive use of time alone when socialization is not possible  Outcome: Met This Shift     Problem: Fatigue  Goal: Verbalize increase energy and improved vitality  Outcome: Met This Shift     Problem: Patient Education: Go to Patient Education Activity  Goal: Patient/Family Education  Outcome: Met This Shift     Problem: Pain:  Goal: Pain level will decrease  Description: Pain level will decrease  Outcome: Met This Shift  Goal: Control of acute pain  Description: Control of acute pain  Outcome: Met This Shift  Goal: Control of chronic pain  Description: Control of chronic pain  Outcome: Met This Shift     Problem: Skin Integrity:  Goal: Will show no infection signs and symptoms  Description: Will show no infection signs and symptoms  Outcome: Met This Shift  Goal: Absence of new skin breakdown  Description: Absence of new skin breakdown  Outcome: Met This Shift

## 2021-09-17 NOTE — PROGRESS NOTES
Critical Care Team - Daily Progress Note      Date and time: 2021 11:04 AM  Patient's name:  Ryan Houser  Medical Record Number: [id number]  Patient's account/billing number: [de-identified]  Patient's YOB: 1950  Age: 66 y.o. Date of Admission: 2021  2:25 PM  Length of stay during current admission: 18      Primary Care Physician: Javier Marie MD  ICU Attending Physician: Dr. Lukasz Baxter    Code Status: Full Code    Reason for ICU admission: Acute respiratory failure with hypoxia COVID-19      SUBJECTIVE:     OVERNIGHT EVENTS:       No acute events overnight for the patient    9/10: Acute respiratory distress overnight on BiPAP and she required intubation. : Patient overnight had increasing respiratory distress, was intubated by anesthesia. Patient sedation minimal and patient was awake, will increase sedation    : patient remains prone, FiO2 to 80%     no acute events overnight patient remains prone at this time. : No acute events overnight per overnight nursing staff patient's not required any ventilation changes at this time and saturating well. 9/15 patient laying flat supine saturations 87-88%. : off pressors, desaturating though SaO2 was 91.  : Patient continues to have poor saturations in the mid 60s, otherwise no other change at this time. Family was asking for a consultation with Dr. Cristopher Crain as well.     OBJECTIVE:     VITAL SIGNS:  BP (!) 129/52   Pulse 97   Temp 98.4 °F (36.9 °C) (Bladder)   Resp 24   Ht 5' 8\" (1.727 m)   Wt 221 lb 5.5 oz (100.4 kg)   SpO2 (!) 67%   BMI 33.65 kg/m²   Tmax over 24 hours:  Temp (24hrs), Av.3 °F (36.8 °C), Min:98 °F (36.7 °C), Max:98.6 °F (37 °C)      Patient Vitals for the past 6 hrs:   BP Temp Temp src Pulse Resp SpO2   21 1023 (!) 129/52 98.4 °F (36.9 °C) Bladder 97 24 --   21 0850 -- -- -- -- 24 (!) 67 %   21 0845 -- -- -- 87 23 (!) 66 %   21 0828 (!) 116/46 98.4 °F (36.9 °C) Bladder 80 24 --   09/17/21 0800 -- -- -- 81 24 (!) 69 %   09/17/21 0700 -- -- -- 93 24 (!) 73 %   09/17/21 0600 -- -- -- 91 24 (!) 79 %   09/17/21 0551 -- -- -- 90 24 (!) 79 %         Intake/Output Summary (Last 24 hours) at 9/17/2021 1104  Last data filed at 9/17/2021 0927  Gross per 24 hour   Intake 1870.8 ml   Output 1765 ml   Net 105.8 ml     Wt Readings from Last 2 Encounters:   09/16/21 221 lb 5.5 oz (100.4 kg)   08/30/21 213 lb (96.6 kg)     Body mass index is 33.65 kg/m².         PHYSICAL EXAMINATION:    General appearance -intubated sedated no distreess  Mental status - sedated, no history available  Eyes - pupils equal and reactive   Chest - coarse rales right lung to auscultation, no wheezes, rales or rhonchi, symmetric air entry  Heart - normal rate, regular rhythm, normal S1, S2, no murmurs, rubs, clicks or gallops  Abdomen - soft, nontender, nondistended, no masses or organomegaly  Neurological -patient intubated sedated, pupils equal reactive to light  Extremities - peripheral pulses normal, no pedal edema, no clubbing or cyanosis  Skin - normal coloration and turgor, no rashes, no suspicious skin lesions noted   Neuro: sedated,paralyzed     Any additional physical findings:    MEDICATIONS:    Scheduled Meds:   sodium zirconium cyclosilicate  5 g Oral TID    insulin glargine  40 Units SubCUTAneous BID    baricitinib  2 mg Oral Daily    enoxaparin  50 mg SubCUTAneous BID    pantoprazole  40 mg IntraVENous Daily    And    sodium chloride (PF)  10 mL IntraVENous Daily    Refresh Lacri-Lube   Ophthalmic Q6H    insulin lispro  0-30 Units SubCUTAneous Q4H    chlorhexidine  15 mL Mouth/Throat BID    albuterol  2.5 mg Nebulization Q4H WA    dexamethasone  6 mg IntraVENous Q6H    [Held by provider] amLODIPine  5 mg Oral Daily    [Held by provider] losartan  100 mg Oral Daily    And    [Held by provider] hydroCHLOROthiazide  25 mg Oral Daily    guaiFENesin-dextromethorphan  5 mL Oral Q6H    sodium chloride flush  5-40 mL IntraVENous 2 times per day    Vitamin D  2,000 Units Oral Daily    zinc sulfate  50 mg Oral Daily    ascorbic acid  500 mg Oral BID    pravastatin  20 mg Oral Daily    levothyroxine  150 mcg Oral Daily     Continuous Infusions:   sodium chloride 50 mL/hr at 09/17/21 0217    norepinephrine Stopped (09/15/21 1239)    fentaNYL 5 mcg/ml in 0.9%  ml infusion 175 mcg/hr (09/17/21 0443)    propofol 15 mcg/kg/min (09/17/21 0321)    cisatracurium (NIMBEX) infusion 2 mcg/kg/min (09/17/21 0515)    sodium chloride      dextrose       PRN Meds:   sodium chloride flush, 5-40 mL, PRN  sodium chloride, 25 mL, PRN  ondansetron, 4 mg, Q8H PRN   Or  ondansetron, 4 mg, Q6H PRN  polyethylene glycol, 17 g, Daily PRN  acetaminophen, 650 mg, Q6H PRN   Or  acetaminophen, 650 mg, Q6H PRN  glucose, 15 g, PRN  dextrose, 12.5 g, PRN  glucagon (rDNA), 1 mg, PRN  dextrose, 100 mL/hr, PRN  sodium chloride, 30 mL, PRN          VENT SETTINGS (Comprehensive) (if applicable):  Vent Information  $Ventilation: $Subsequent Day  Skin Assessment: Clean, dry, & intact  Equipment ID: [id number]  Equipment Changed: (S) Humidification  Vent Type: [id number]  Vent Mode: AC/VC  Vt Ordered: 380 mL  Rate Set: 24 bmp  Peak Flow: 60 L/min  Pressure Support: 0 cmH20  FiO2 : 100 %  SpO2: (!) 67 %  SpO2/FiO2 ratio: 67  PaO2/FiO2 ratio: 68  Sensitivity: 2  PEEP/CPAP: (S) 16  I Time/ I Time %: 0 s  Humidification Source: Heated wire  Humidification Temp: 37  Humidification Temp Measured: 37.3  Circuit Condensation: Drained  Mask Type: Full face mask  Mask Size: Medium  Additional Respiratory  Assessments  Pulse: 97  Resp: 24  SpO2: (!) 67 %  Position: Reverse Trendelenburg  Humidification Source: Heated wire  Humidification Temp: 37  Circuit Condensation: Drained  Oral Care: Mouth swabbed, Mouth suctioned, Mouthwash with chlorhexidine  Subglottic Suction Done?: Yes  Cuff Pressure (cm H2O): 29 cm H2O    ABGs:     Laboratory findings:    Complete Blood Count:   Recent Labs     09/15/21  0535 09/16/21  0551 09/17/21  0747   WBC 30.1* 16.6* 23.3*   HGB 12.3 11.1* 11.3*   HCT 37.8 35.3 36.7    220 233        Last 3 Blood Glucose:   Recent Labs     09/15/21  1520 09/16/21  0551 09/17/21  0747   GLUCOSE 153* 302* 301*        PT/INR:    Lab Results   Component Value Date    PROTIME 13.1 03/05/2016    INR 1.2 03/05/2016     PTT:    Lab Results   Component Value Date    APTT 32.5 03/05/2016       Comprehensive Metabolic Profile:   Recent Labs     09/15/21  0535 09/15/21  0535 09/15/21  1520 09/16/21  0551 09/16/21  0551 09/17/21  0747      < > 135 134  --  139   K 5.6*  --  5.4* 6.2*  --   --       < > 104 104  --  107   CO2 24   < > 25 25  --  27   BUN 72*   < > 69* 70*  --  69*   CREATININE 1.2*   < > 1.1* 1.0  --  0.9   GLUCOSE 276*   < > 153* 302*  --  301*   CALCIUM 9.4   < > 8.9 8.8  --  8.9   PROT 6.0*   < >  --  5.2*   < > 5.1*   LABALBU 2.7*   < >  --  2.3*   < > 2.2*   BILITOT 0.3   < >  --  <0.2   < > <0.2   ALKPHOS 77   < >  --  89   < > 112*   AST 24   < >  --  67*   < > 93*   ALT 61*   < >  --  84*   < > 187*    < > = values in this interval not displayed. Magnesium:   Lab Results   Component Value Date    MG 2.9 09/17/2021     Phosphorus:   Lab Results   Component Value Date    PHOS 3.1 09/17/2021     Ionized Calcium: No results found for: CAION     Urinalysis:     Troponin: No results for input(s): TROPONINI in the last 72 hours.       ASSESSMENT:     Patient Active Problem List    Diagnosis Date Noted    Sepsis ([facility] 03/06/2016    E. coli septicemia ([facility] 03/06/2016    Acute cystitis 03/06/2016    Type 2 diabetes mellitus without complication ([facility] 27/91/7536    Hypertension 03/06/2016    Pneumonia due to COVID-19 virus 08/30/2021    Acute respiratory failure with hypoxia (HCC)     Elevated LFTs     [facility]controlled type [facility]     UTI due to extended-spectrum ivermectin added.     >30 min CCT

## 2021-09-17 NOTE — PLAN OF CARE
Maintain normal heart rhythm  9/17/2021 0325 by Paulino Atkins RN  Outcome: Met This Shift  9/16/2021 1628 by Aarti Rodrigues RN  Outcome: Met This Shift  Goal: Maintain absence of muscle cramping  9/17/2021 0325 by Paulino Atkins RN  Outcome: Met This Shift  9/16/2021 1628 by Aarti Rodrigues RN  Outcome: Met This Shift  Goal: Maintain normal serum potassium, sodium, calcium, phosphorus, and pH  9/17/2021 0325 by Paulino Atkins RN  Outcome: Met This Shift  9/16/2021 1628 by Aarti Rodrigues RN  Outcome: Met This Shift     Problem: Loneliness or Risk for Loneliness  Goal: Demonstrate positive use of time alone when socialization is not possible  9/17/2021 0325 by Paulino Atkins RN  Outcome: Met This Shift  9/16/2021 1628 by Aarti Rodrigues RN  Outcome: Met This Shift     Problem: Fatigue  Goal: Verbalize increase energy and improved vitality  9/17/2021 0325 by Paulino Atkins RN  Outcome: Met This Shift  9/16/2021 1628 by Aarti Rodrigues RN  Outcome: Met This Shift     Problem: Patient Education: Go to Patient Education Activity  Goal: Patient/Family Education  9/17/2021 0325 by Paulino Atkins RN  Outcome: Met This Shift  9/16/2021 1628 by Aarti Rodrigues RN  Outcome: Met This Shift     Problem: Pain:  Goal: Pain level will decrease  Description: Pain level will decrease  9/17/2021 0325 by Paulino Atkins RN  Outcome: Met This Shift  9/16/2021 1628 by Aarti Rodrigues RN  Outcome: Met This Shift  Goal: Control of acute pain  Description: Control of acute pain  9/17/2021 0325 by Paulino Atkins RN  Outcome: Met This Shift  9/16/2021 1628 by Aarti Rodrigues RN  Outcome: Met This Shift  Goal: Control of chronic pain  Description: Control of chronic pain  9/17/2021 0325 by Paulino Atkins RN  Outcome: Met This Shift  9/16/2021 1628 by Aarti Rodrigues RN  Outcome: Met This Shift     Problem: Skin Integrity:  Goal: Will show no infection signs and symptoms  Description: Will show no infection signs and symptoms  9/17/2021 0325 by Naomi Ratliff RN  Outcome: Met This Shift  9/16/2021 1628 by Eddie Ely RN  Outcome: Met This Shift  Goal: Absence of new skin breakdown  Description: Absence of new skin breakdown  9/17/2021 0325 by Naomi Ratliff RN  Outcome: Met This Shift  9/16/2021 1628 by Eddie Ely RN  Outcome: Met This Shift

## 2021-09-17 NOTE — PATIENT CARE CONFERENCE
Talked to Dr. Ivonne Boyce about patients SpO2 sustaining 80-82%. Reviewed case with him. He explained to me that there is nothing more that can be done. I called  Patricia Zazueta to update him on his wife's condition. I explained that her SpO2 has steadily declined all day and is still declining. I explained to him that there was nothing more that can be done for her. He quickly tried to end the conversation. I asked if he would be coming up to see her and he said yes and then hung up the phone before I could finish speaking to him. After 15 minutes her sister and  and son showed up. Multiple questions were answered.  didn't seem to grasp the severity of his wife's condition. He asked if she could be transferred to another hospital. I explained to him that she is very unstable and there would have to be an accepting physician from the hospital he wished to transfer her to. Family states they want to \"remain hopeful\" and wish to keep her a full code.

## 2021-09-17 NOTE — CARE COORDINATION
Continue ICU, COVID+. Continue vent support, prognosis poor. Remains on paralytic, sedated. Alternating supine to prone.  Select is following-jasono

## 2021-09-17 NOTE — PROGRESS NOTES
ENDOCRINOLOGY PROGRESS NOTE      Date of admission: 8/30/2021  Date of service: 9/10/2021  Admitting physician: Ellyn Keita MD   Primary Care Physician: Lucretia Chun MD  Consultant physician: Cristina Ellison MD     Reason for the consultation:  Uncontrolled DM    History of Present Illness: The history was seen thru ICU window due to Vicky Yadav is a 66 y.o. old female with PMH DM type 2, thyroid cancer, postsurgical hypothyroidism, lung cancer s/p right upper lobe lung surgery and other  listed below admitted to [facility] on 8/30/2021 because of cough, fatigue. Associated body aches, endocrine service was consulted for diabetes management. Subjective   Pt was seen thru ICU window due to COVID, BG running high     Inpatient diet:   Tube feeds     Point of care glucose monitoring   (Independently reviewed)   Recent Labs     09/15/21  2227 09/16/21  0327 09/16/21  0959 09/16/21  1348 09/16/21  1804 09/16/21  2149 09/17/21  1025 09/17/21  1440   GLUMET 182* 235* 234* 229* 200* 216* 313* 325*       Past medical history:   Past Medical History:   Diagnosis Date    Cancer ([facility]     Diabetes mellitus ([facility]     Hypertension     Hypothyroidism     Thyroid disease     Urinary incontinence        Past surgical history:  Past Surgical History:   Procedure Laterality Date    THYROIDECTOMY      TUBAL LIGATION         Social history:   Tobacco:   reports that she has never smoked. She has never used smokeless tobacco.  Alcohol:   reports no history of alcohol use. Drugs:   reports no history of drug use.     Family history:    Family History   Problem Relation Age of Onset    Diabetes Mother     Hypertension Mother     Stroke Mother     Diabetes Father     Hypertension Father     Thyroid Disease Daughter     Down Syndrome Daughter     Diabetes Maternal Grandmother     Hypertension Maternal Grandmother        Allergy and drug reactions:   No Known Allergies    Scheduled Meds:   ivermectin  200 mcg/kg (Adjusted) Oral Daily    insulin lispro  0-42 Units SubCUTAneous Q4H    sodium zirconium cyclosilicate  5 g Oral TID    insulin glargine  40 Units SubCUTAneous BID    baricitinib  2 mg Oral Daily    enoxaparin  50 mg SubCUTAneous BID    pantoprazole  40 mg IntraVENous Daily    And    sodium chloride (PF)  10 mL IntraVENous Daily    Refresh Lacri-Lube   Ophthalmic Q6H    chlorhexidine  15 mL Mouth/Throat BID    albuterol  2.5 mg Nebulization Q4H WA    dexamethasone  6 mg IntraVENous Q6H    [Held by provider] amLODIPine  5 mg Oral Daily    [Held by provider] losartan  100 mg Oral Daily    And    [Held by provider] hydroCHLOROthiazide  25 mg Oral Daily    sodium chloride flush  5-40 mL IntraVENous 2 times per day    Vitamin D  2,000 Units Oral Daily    zinc sulfate  50 mg Oral Daily    ascorbic acid  500 mg Oral BID    pravastatin  20 mg Oral Daily    levothyroxine  150 mcg Oral Daily     PRN Meds:   sodium chloride flush, 5-40 mL, PRN  sodium chloride, 25 mL, PRN  ondansetron, 4 mg, Q8H PRN   Or  ondansetron, 4 mg, Q6H PRN  polyethylene glycol, 17 g, Daily PRN  acetaminophen, 650 mg, Q6H PRN   Or  acetaminophen, 650 mg, Q6H PRN  glucose, 15 g, PRN  dextrose, 12.5 g, PRN  glucagon (rDNA), 1 mg, PRN  dextrose, 100 mL/hr, PRN  sodium chloride, 30 mL, PRN      Continuous Infusions:   sodium chloride 50 mL/hr at 09/17/21 0217    norepinephrine Stopped (09/15/21 1239)    fentaNYL 5 mcg/ml in 0.9%  ml infusion 175 mcg/hr (09/17/21 1155)    propofol 15 mcg/kg/min (09/17/21 1154)    cisatracurium (NIMBEX) infusion 2 mcg/kg/min (09/17/21 4480)    sodium chloride      dextrose       OBJECTIVE    BP (!) 117/49   Pulse 106   Temp 98.5 °F (36.9 °C) (Bladder)   Resp 23   Ht 5' 8\" (1.727 m)   Wt 221 lb 5.5 oz (100.4 kg)   SpO2 (!) 73%   BMI 33.65 kg/m²     Intake/Output Summary (Last 24 hours) at 9/17/2021 1817  Last data filed at 9/17/2021 1415  Gross per 24 hour   Intake 1172 ml Output 1415 ml   Net -243 ml       Physical examination:  Pt was seen thru ICU window due to covid   Neck: no obvious neck mass. No obvious neck deformity     CVS/chest Chest is moving with respiration    Extremities:  no visible tremor  Skin: No visible rashes as seen from camera   Musculoskeletal: no visible deformity    Review of Laboratory Data:  I personally reviewed the following labs:   Recent Labs     09/15/21  0535 09/16/21  0551 09/17/21  0747   WBC 30.1* 16.6* 23.3*   RBC 3.99 3.60 3.70   HGB 12.3 11.1* 11.3*   HCT 37.8 35.3 36.7   MCV 94.7 98.1 99.2   MCH 30.8 30.8 30.5   MCHC 32.5 31.4* 30.8*   RDW 13.0 13.3 13.6    220 233   MPV 10.6 11.4 11.1     Recent Labs     09/15/21  0535 09/15/21  0535 09/15/21  1520 09/16/21  0551 09/17/21  0747      < > 135 134 139   K 5.6*   < > 5.4* 6.2* 6.5*      < > 104 104 107   CO2 24   < > 25 25 27   BUN 72*   < > 69* 70* 69*   CREATININE 1.2*   < > 1.1* 1.0 0.9   GLUCOSE 276*   < > 153* 302* 301*   CALCIUM 9.4   < > 8.9 8.8 8.9   PROT 6.0*  --   --  5.2* 5.1*   LABALBU 2.7*  --   --  2.3* 2.2*   BILITOT 0.3  --   --  <0.2 <0.2   ALKPHOS 77  --   --  89 112*   AST 24  --   --  67* 93*   ALT 61*  --   --  84* 187*    < > = values in this interval not displayed. No results found for: BHYDRXBUT  Lab Results   Component Value Date    LABA1C 7.4 08/31/2021    LABA1C 6.6 11/19/2013    LABA1C 6.6 08/27/2013     Lab Results   Component Value Date/Time    TSH 1.220 03/04/2016 10:26 AM     Lab Results   Component Value Date    LABA1C 7.4 08/31/2021    GLUCOSE 301 09/17/2021     No results found for: TRIG, HDL, LDLCALC, CHOL    Blood culture   Lab Results   Component Value Date    BC 5 Days no growth 08/31/2021    BC 5 Days- no growth 02/25/2020       Radiology:  XR CHEST PORTABLE   Final Result   Bilateral pulmonary infiltrates, slightly increased since the prior day. XR CHEST PORTABLE   Final Result   1.   Persistent the bilateral infiltrates compatible with acute infectious   viral pneumonia. 2.  Endotracheal tube in good position. 3.  NG tube towards the stomach. 4.  Left internal jugular central venous catheter tip is towards the left   subclavian/axillary vein as seen previously. 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 US DUP UPPER EXTREMITIES BILATERAL VENOUS   Final Result   1. Occlusive thrombus in the bilateral superficial cephalic veins and in the   antecubital fossa. No color flow identified in these regions. 2.  No additional evidence of thrombus in either upper extremity. US DUP LOWER EXTREMITIES BILATERAL VENOUS   Final Result   No evidence of DVT in either lower extremity. XR CHEST PORTABLE   Final Result   1. Multifocal bilateral airspace disease which is more advanced when compared   with the patient's prior study of 09/11/2021   2. Findings of pneumomediastinum. There is significant subcutaneous   emphysema seen within the soft tissues of the neck. XR CHEST PORTABLE   Final Result   1. 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 2. Progressively decreasing pneumomediastinum, unchanged bilateral pneumonia. XR CHEST PORTABLE   Final Result   1. Decreased pneumomediastinum, there is a small amount of extrapleural gas   in the right apex but no sign of pneumothorax. 2. Little change bilateral airspace consolidation concerning for pneumonia. 3. Lines okay         XR CHEST PORTABLE   Final Result   1. Pneumomediastinum. 2. Question of very small left apical pneumothorax. 3. Unchanged bilateral infiltrates. 4. Status post intubation with ET tube tip located 3.3 cm above gurinder. Findings were sent to Radiology Results [address] at 5:13 a.m. on   09/11/2021 to be communicated to a licensed caregiver.          XR CHEST ABDOMEN NG PLACEMENT   Final Result   Nasogastric tube terminates in stomach. XR CHEST PORTABLE   Final Result   Stable bilateral pulmonary airspace opacities. CTA PULMONARY W CONTRAST   Final Result   1. No indication for acute pulmonary emboli. 2.  Bilateral infiltrates that can be related with acute infectious viral   pneumonia. Please correlate clinically. 3.  Some fullness of the collecting system of both kidneys. See above   comments and recommendations. XR CHEST PORTABLE   Final Result   Bilateral hazy opacities are similar to the previous exam and are suspicious   for pneumonia. This appearance can be seen with COVID-19. US DUP LOWER EXTREMITIES BILATERAL VENOUS   Final Result   No evidence of DVT in either lower extremity. Medical Records/Labs/Images review:   I personally reviewed and summarized previous records   All labs and imaging were reviewed independently     [address] Jannet More, a 66 y.o.-old female seen today for inpatient diabetes management     Diabetes Mellitus type 2  · Currently worsened with steroids   · On tube feeds   · We recommend the following diabetes regimen   · Lantus 40 Units BID   · Modified High dose sliding scale with increments of 7U  Q4hrs   · Continue glucose check with meals and at bedtime   · Will titrate insulin dose based on the blood glucose trend & insulin requirement    COVID Pneumonia   · Management per critical care team  · Closely monitor glucose while on steroids      Thyroid cancer, s/p total thyroidectomy   · Pathology report not available  · Currently on Levothyroxine 150 mcg daily  · No recent TFT. Pt currently critically sick and on high dose steroids and these will likely affect thyroid lab result. Plan to check TFT once current condition improve     Thank you for allowing us to participate in the care of this patient. Please do not hesitate to contact us with any additional questions.      Franky Goodrich MD  Endocrinologist, [facility]   [address] [place] [id number]   Phone: [phone]  Fax: [phone]  --------------------------------------------  An electronic signature was used to authenticate this note.  Rosalio Dhillon MD on 9/17/2021 at 6:17 PM

## 2021-09-17 NOTE — PROGRESS NOTES
[address] [place] [address] [facility]  [facility]  Progress Note    SUBJECTIVE:  No chief complaint on file. Patient remains in the ICU. She is still intubated, sedated and paralyzed. Vasopressors were stopped. No fever. Review of systems:  As stated above in the chief complaint, otherwise negative.     Medications:  Scheduled Meds:   sodium zirconium cyclosilicate  5 g Oral TID    insulin glargine  40 Units SubCUTAneous BID    baricitinib  2 mg Oral Daily    enoxaparin  50 mg SubCUTAneous BID    pantoprazole  40 mg IntraVENous Daily    And    sodium chloride (PF)  10 mL IntraVENous Daily    Refresh Lacri-Lube   Ophthalmic Q6H    insulin lispro  0-30 Units SubCUTAneous Q4H    chlorhexidine  15 mL Mouth/Throat BID    albuterol  2.5 mg Nebulization Q4H WA    dexamethasone  6 mg IntraVENous Q6H    [Held by provider] amLODIPine  5 mg Oral Daily    [Held by provider] losartan  100 mg Oral Daily    And    [Held by provider] hydroCHLOROthiazide  25 mg Oral Daily    guaiFENesin-dextromethorphan  5 mL Oral Q6H    sodium chloride flush  5-40 mL IntraVENous 2 times per day    Vitamin D  2,000 Units Oral Daily    zinc sulfate  50 mg Oral Daily    ascorbic acid  500 mg Oral BID    pravastatin  20 mg Oral Daily    levothyroxine  150 mcg Oral Daily     Continuous Infusions:   sodium chloride 50 mL/hr at 09/17/21 0217    norepinephrine Stopped (09/15/21 1239)    fentaNYL 5 mcg/ml in 0.9%  ml infusion 175 mcg/hr (09/17/21 0443)    propofol 15 mcg/kg/min (09/17/21 0321)    cisatracurium (NIMBEX) infusion 2 mcg/kg/min (09/17/21 4408)    sodium chloride      dextrose       PRN Meds:sodium chloride flush, sodium chloride, ondansetron **OR** ondansetron, polyethylene glycol, acetaminophen **OR** acetaminophen, glucose, dextrose, glucagon (rDNA), dextrose, sodium chloride    OBJECTIVE:  BP (!) 116/46   Pulse 87   Temp 98.4 °F (36.9 °C) (Bladder)   Resp 24   Ht 5' 8\" (1.727 m)   Wt 221 lb 5.5 oz (100.4 kg)   SpO2 (!) 67%   BMI 33.65 kg/m²   Temp  Av.3 °F (36.8 °C)  Min: 98 °F (36.7 °C)  Max: 98.6 °F (37 °C)  Constitutional: The patient is lying in bed in the ICU. She is supine, sedated and paralyzed. FiO2 100%. PEEP 16. Skin: Warm and dry. No rashes were noted. HEENT: Nonreactive pupils. No thrush. ET tube. NG tube. Neck: Not examined. Chest: Good breath sounds bilaterally. No crackles. Cardiovascular: Heart sounds rhythmic and regular. Abdomen: Round, soft. Positive bowel sounds. Extremities: No edema. Lines: Right radial arterial line 2021. Left IJ TLC 2021. Pinzon catheter.     Laboratory and Tests Review:  Lab Results   Component Value Date    WBC 23.3 (H) 2021    WBC 16.6 (H) 2021    WBC 30.1 (H) 09/15/2021    HGB 11.3 (L) 2021    HCT 36.7 2021    MCV 99.2 2021     2021     Lab Results   Component Value Date    NEUTROABS 21.67 (H) 2021    NEUTROABS 14.41 (H) 2021    NEUTROABS 26.38 (H) 09/15/2021     No results found for: [facility]  Lab Results   Component Value Date    ALT 84 (H) 2021    AST 67 (H) 2021    ALKPHOS 89 2021    BILITOT <0.2 2021     Lab Results   Component Value Date     2021    K 6.2 2021    K 4.4 2021     2021    CO2 25 2021    BUN 70 2021    CREATININE 1.0 2021    CREATININE 1.1 09/15/2021    CREATININE 1.2 09/15/2021    GFRAA >60 2021    LABGLOM 54 2021    GLUCOSE 302 2021    PROT 5.2 2021    LABALBU 2.3 2021    CALCIUM 8.8 2021    BILITOT <0.2 2021    ALKPHOS 89 2021    AST 67 2021    ALT 84 2021     Lab Results   Component Value Date    CRP 8.2 (H) 2021    CRP 13.1 (H) 09/15/2021    CRP 11.8 (H) 2021     No results found for: Lowanda Bright  Radiology:      Microbiology:   Nares screen MRSA: Negative  Blood cultures 2021: Negative  Streptococcus pneumoniae/Legionella urine Ag: negative   Respiratory culture 9/11/2021: Not obtained  Respiratory culture 9/13/2021: OP hermes reduced    ASSESSMENT:  · SARS-CoV-2 infection with severe pneumonia. Completed course of Remdesivir  · Acute respiratory failure  · Leukocytosis.   The patient is on steroids  · Possible superimposed bacterial pneumonia, treated with Levofloxacin    PLAN:  · Continue supportive treatment  · Continue Baricitinib, day 3  · Antibiotics are not warranted at this point  · We will follow closely with you     Spoke with nursing    Cj Moore MD  9:45 AM  9/17/2021

## 2021-09-17 NOTE — PROGRESS NOTES
[facility] Progress Note    Admitting Date and Time: 8/30/2021  2:25 PM  Admit Dx: Pneumonia due to COVID-19 virus [U07.1, J12.82]    Subjective:  Patient is being followed for Pneumonia due to COVID-19 virus [U07.1, J12.82]   patient is intubated, O2 sats worsened overnight, at 79%, on 100% FiO2,        sodium zirconium cyclosilicate  5 g Oral TID    insulin glargine  40 Units SubCUTAneous BID    baricitinib  2 mg Oral Daily    enoxaparin  50 mg SubCUTAneous BID    pantoprazole  40 mg IntraVENous Daily    And    sodium chloride (PF)  10 mL IntraVENous Daily    Refresh Lacri-Lube   Ophthalmic Q6H    insulin lispro  0-30 Units SubCUTAneous Q4H    chlorhexidine  15 mL Mouth/Throat BID    albuterol  2.5 mg Nebulization Q4H WA    dexamethasone  6 mg IntraVENous Q6H    [Held by provider] amLODIPine  5 mg Oral Daily    [Held by provider] losartan  100 mg Oral Daily    And    [Held by provider] hydroCHLOROthiazide  25 mg Oral Daily    guaiFENesin-dextromethorphan  5 mL Oral Q6H    sodium chloride flush  5-40 mL IntraVENous 2 times per day    Vitamin D  2,000 Units Oral Daily    zinc sulfate  50 mg Oral Daily    ascorbic acid  500 mg Oral BID    pravastatin  20 mg Oral Daily    levothyroxine  150 mcg Oral Daily     sodium chloride flush, 5-40 mL, PRN  sodium chloride, 25 mL, PRN  ondansetron, 4 mg, Q8H PRN   Or  ondansetron, 4 mg, Q6H PRN  polyethylene glycol, 17 g, Daily PRN  acetaminophen, 650 mg, Q6H PRN   Or  acetaminophen, 650 mg, Q6H PRN  glucose, 15 g, PRN  dextrose, 12.5 g, PRN  glucagon (rDNA), 1 mg, PRN  dextrose, 100 mL/hr, PRN  sodium chloride, 30 mL, PRN         Objective:    BP (!) 126/48   Pulse 91   Temp 98.2 °F (36.8 °C) (Bladder)   Resp 24   Ht 5' 8\" (1.727 m)   Wt 221 lb 5.5 oz (100.4 kg)   SpO2 (!) 79%   BMI 33.65 kg/m²     General Appearance: intubated and paralyzed    Skin: warm and dry  Head: normocephalic and atraumatic  Eyes: pupils equal, round, conjunctivae normal  Neck: neck supple and non tender without mass   Pulmonary/Chest: decreased sounds bilaterally- no wheezes, normal air movement, no respiratory distress  Cardiovascular: normal rate, normal S1 and S2 and no carotid bruits  Abdomen: soft, non-distended, normal bowel sounds  Extremities: no cyanosis, no clubbing   Neurologic: intubated and sedated        Recent Labs     09/15/21  0535 09/15/21  1520 09/16/21  0551    135 134   K 5.6* 5.4* 6.2*    104 104   CO2 24 25 25   BUN 72* 69* 70*   CREATININE 1.2* 1.1* 1.0   GLUCOSE 276* 153* 302*   CALCIUM 9.4 8.9 8.8       Recent Labs     09/15/21  0535 09/16/21  0551 09/17/21  0747   WBC 30.1* 16.6* 23.3*   RBC 3.99 3.60 3.70   HGB 12.3 11.1* 11.3*   HCT 37.8 35.3 36.7   MCV 94.7 98.1 99.2   MCH 30.8 30.8 30.5   MCHC 32.5 31.4* 30.8*   RDW 13.0 13.3 13.6    220 233   MPV 10.6 11.4 11.1        CTA:  1.  No indication for acute pulmonary emboli.       2.  Bilateral infiltrates that can be related with acute infectious viral   pneumonia.  Please correlate clinically.       3.  Some fullness of the collecting system of both kidneys.  See above   comments and recommendations. Assessment:    Active Problems:    Pneumonia due to COVID-19 virus    Acute respiratory failure with hypoxia (HCC)    Elevated LFTs    Uncontrolled type 2 diabetes mellitus with hyperglycemia ([facility] [address]  Resolved Problems:    * No resolved hospital problems. *      Plan:  1. Acute hypoxic aspiratory failure oxygen saturation was 87% on room air on presentation, intubated on 9/12/2021,  CTA chest was negative for PE continue vent management FiO2 100% with O2 sats 79%  2. COVID-19 pneumonia, patient finished course of remdesivir, continue on Decadron, started on baricitinib as well, family is requesting Endo-Mectin, there is no benefit from ivermectin I do not believe it would be of any help and it might cause harm so we will not order it. Currently intubated  3. Superimposed bacterial pneumonia with elevated procalcitonin, patient finished a course of levaquin , appreciate pulmonary input, CTA was negative for PE  4. Elevated transaminases, most likely due to Covid infection, improved continue monitoring. 5.  History of diabetes type 2, blood glucose is not controlled due to steroids usually on Trulicity, Metformin and glimepiride, those were placed on hold, continue sliding scale insulin,on lantus to 35 units and lispro very high-dose sliding scale per endocrinology  6. History of hypertension, blood pressure is acceptable, antihypertensive medication were placed on hold, consider restarting. .  7.  History of hypothyroidism, continue levothyroxine  8. Goals of care, patient has a very poor prognosis, especially that she is becoming more hypoxic on the vent. Palliative is on board, family is going through grief and in denial unfortunately, we don't want to give false hopes, they asked the night nurse to transfer the patient to a different facility, the patient is not stable for transfer, and I am not sure any facility would be able to accept her in the current condition, especially during the pandemic with the bed crisis   Patient is still full code      NOTE: This report was transcribed using voice recognition software. Every effort was made to ensure accuracy; however, inadvertent computerized transcription errors may be present.   Electronically signed by Haritha Eubanks MD on 9/17/2021 at 8:26 AM

## 2021-09-17 NOTE — FLOWSHEET NOTE
Inability to reposition patient due to rapid desaturation upon repositioning, increase Oxygen demands with hemodynamic instability. Current oxygen saturation 74 % on 100%, PEEP 14.

## 2021-09-17 NOTE — CONSULTS
Pulmonary Consultation    Admit Date: 8/30/2021  Requesting Physician: Lucretia Chun MD    Reason for consultation:  · Second opinion regarding respiratory failure  HPI:  · The patient is a 68-year-old female who presented to the hospital with shortness of breath. Diagnosed as having COVID-19 pneumonia, the patient's clinical course saw her admitted to the ICU where she was intubated and ventilated. Chronology as follows:  · Seen in the emergency room and admitted 8/30  · Started on normal treatment protocol with deterioration transfer to the ICU on 9/9  · Failure of noninvasive ventilation requiring intubation on 9/11  · Infectious disease consultation 9/11. Consideration for tocilizumab or baricitinib depending on CRP. CRP was low. · Proning on 9/13  · Increasing levels of PEEP and FiO2 required through 9/16  · Of acute deterioration on 9/17 with worsening oxygen levels. D-dimer is noted to be low. Empiric course of Levaquin for presumed underlying nosocomial pneumonia completed. Chest radiograph without catastrophic change    PMH:    Past Medical History:   Diagnosis Date    Cancer ([facility] [facility]     Diabetes mellitus ([facility] [address] [facility]     Hypertension     Hypothyroidism     Thyroid disease     Urinary incontinence      PSH:   Past Surgical History:   Procedure Laterality Date    THYROIDECTOMY      TUBAL LIGATION         Review of Systems:    Unobtainable due to patient factors.       Social History:  · Alcohol: No  · Tobacco:   No  · Employment:  no silica or asbestos exposure  · Family:  No family history of lung disease    Medications:   sodium chloride 50 mL/hr at 09/17/21 0217    norepinephrine Stopped (09/15/21 1239)    fentaNYL 5 mcg/ml in 0.9%  ml infusion 175 mcg/hr (09/17/21 1155)    propofol 15 mcg/kg/min (09/17/21 1154)    cisatracurium (NIMBEX) infusion 2 mcg/kg/min (09/17/21 0890)    sodium chloride      dextrose        sodium zirconium cyclosilicate  5 g Oral TID    insulin glargine  40 Units SubCUTAneous BID    baricitinib  2 mg Oral Daily    enoxaparin  50 mg SubCUTAneous BID    pantoprazole  40 mg IntraVENous Daily    And    sodium chloride (PF)  10 mL IntraVENous Daily    Refresh Lacri-Lube   Ophthalmic Q6H    insulin lispro  0-30 Units SubCUTAneous Q4H    chlorhexidine  15 mL Mouth/Throat BID    albuterol  2.5 mg Nebulization Q4H WA    dexamethasone  6 mg IntraVENous Q6H    [Held by provider] amLODIPine  5 mg Oral Daily    [Held by provider] losartan  100 mg Oral Daily    And    [Held by provider] hydroCHLOROthiazide  25 mg Oral Daily    sodium chloride flush  5-40 mL IntraVENous 2 times per day    Vitamin D  2,000 Units Oral Daily    zinc sulfate  50 mg Oral Daily    ascorbic acid  500 mg Oral BID    pravastatin  20 mg Oral Daily    levothyroxine  150 mcg Oral Daily       Vitals:  Tmax:  VITALS:  BP (!) 117/49   Pulse 101   Temp 98.5 °F (36.9 °C) (Bladder)   Resp 24   Ht 5' 8\" (1.727 m)   Wt 221 lb 5.5 oz (100.4 kg)   SpO2 (!) 73%   BMI 33.65 kg/m²   24HR INTAKE/OUTPUT:      Intake/Output Summary (Last 24 hours) at 2021 1723  Last data filed at 2021 1415  Gross per 24 hour   Intake 1172 ml   Output 1565 ml   Net -393 ml     CURRENT PULSE OXIMETRY:  SpO2: (!) 73 %  24HR PULSE OXIMETRY RANGE:  SpO2  Av.9 %  Min: 62 %  Max: 84 %    EXAM:  General: Intubated, ventilated, sedated and prone  Eyes: Unable to examine  ENT: No discharge. Pharynx clear. Orally intubated  Neck: Trachea midline. Neda Sharper Resp: No wheezing. No accessory muscle use. Bilateral rales. No rhonchi. CV: Regular rate. Regular rhythm. Abd: Non-tender. Non-distended. No masses. No organmegaly. Normal bowel sounds. Skin: Warm and dry. No nodule on exposed extremities. No rash on exposed extremities. Lymph: No cervical LAD. No supraclavicular LAD. Ext: No joint deformity. No clubbing. No cyanosis. No edema  Neuro: Paralyzed.      Lab Results:  CBC:   Recent Labs 09/15/21  0535 09/16/21  0551 09/17/21  0747   WBC 30.1* 16.6* 23.3*   HGB 12.3 11.1* 11.3*   HCT 37.8 35.3 36.7   MCV 94.7 98.1 99.2    220 233       BMP:  Recent Labs     09/15/21  0535 09/15/21  0535 09/15/21  1520 09/16/21  0551 09/17/21  0747      < > 135 134 139   K 5.6*   < > 5.4* 6.2* 6.5*      < > 104 104 107   CO2 24   < > 25 25 27   PHOS 2.6  --   --  3.5 3.1   BUN 72*   < > 69* 70* 69*   CREATININE 1.2*   < > 1.1* 1.0 0.9    < > = values in this interval not displayed. ALB:3,BILIDIR:3,BILITOT:3,ALKPHOS:3)@    PT/INR: No results for input(s): PROTIME, INR in the last 72 hours. Cultures:  Sputum: not available  Blood: not available    ABG:   Recent Labs     09/17/21  0801   PH 7.265*   PO2 47.3*   PCO2 64.1*   HCO3 28.4*   BE 0.2   O2SAT 80.0*   METHB 0.2   O2HB 79.6*   COHB 0.3   O2CON 13.5   HHB 19.9*   THB 12.1     FiO2 : 100 %  I:E Ratio: 1:2.60    Films:     XR CHEST PORTABLE   Final Result   Bilateral pulmonary infiltrates, slightly increased since the prior day. XR CHEST PORTABLE   Final Result   1. Persistent the bilateral infiltrates compatible with acute infectious   viral pneumonia. 2.  Endotracheal tube in good position. 3.  NG tube towards the stomach. 4.  Left internal jugular central venous catheter tip is towards the left   subclavian/axillary vein as seen previously. 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 US DUP UPPER EXTREMITIES BILATERAL VENOUS   Final Result   1. Occlusive thrombus in the bilateral superficial cephalic veins and in the   antecubital fossa. No color flow identified in these regions. 2.  No additional evidence of thrombus in either upper extremity. US DUP LOWER EXTREMITIES BILATERAL VENOUS   Final Result   No evidence of DVT in either lower extremity. XR CHEST PORTABLE   Final Result   1.  Multifocal bilateral airspace disease which is more advanced when compared   with the patient's prior study of 09/11/2021   2. Findings of pneumomediastinum. There is significant subcutaneous   emphysema seen within the soft tissues of the neck. XR CHEST PORTABLE   Final Result   1. 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 2. Progressively decreasing pneumomediastinum, unchanged bilateral pneumonia. XR CHEST PORTABLE   Final Result   1. Decreased pneumomediastinum, there is a small amount of extrapleural gas   in the right apex but no sign of pneumothorax. 2. Little change bilateral airspace consolidation concerning for pneumonia. 3. Lines okay         XR CHEST PORTABLE   Final Result   1. Pneumomediastinum. 2. Question of very small left apical pneumothorax. 3. Unchanged bilateral infiltrates. 4. Status post intubation with ET tube tip located 3.3 cm above gurinder. Findings were sent to Radiology Results [id number] at 5:13 a.m. on   09/11/2021 to be communicated to a licensed caregiver. XR CHEST ABDOMEN NG PLACEMENT   Final Result   Nasogastric tube terminates in stomach. XR CHEST PORTABLE   Final Result   Stable bilateral pulmonary airspace opacities. CTA PULMONARY W CONTRAST   Final Result   1. No indication for acute pulmonary emboli. 2.  Bilateral infiltrates that can be related with acute infectious viral   pneumonia. Please correlate clinically. 3.  Some fullness of the collecting system of both kidneys. See above   comments and recommendations. XR CHEST PORTABLE   Final Result   Bilateral hazy opacities are similar to the previous exam and are suspicious   for pneumonia. This appearance can be seen with COVID-19. US DUP LOWER EXTREMITIES BILATERAL VENOUS   Final Result   No evidence of DVT in either lower extremity. .        Assessment:  1. Severe COVID-19 pneumonia with profound hypoxic respiratory failure.   This is necessitated mechanical ventilation, sedation and paralysis. Proning is used as well. The patient is on baricitinib and has completed remdesivir. She continues on Decadron. DVT prophylaxis is in place with relative contraindication to therapeutic anticoagulation as this has been seem to worsen outcome. Plan:  1. 5-day trial of ivermectin 200 mcg/kg/day. Reference  2. Continue current care otherwise      Thanks for letting us see this patient in consultation. Total time in reviewing the previous admissions and records, reviewing the current x-rays, labs, and discussing with clinical staff including nursing and physicians, exceeded 100 minutes. The case was reviewed with infectious disease as well as critical care. Family meeting was held at which time all relevant therapies were reviewed and prognosis reviewed as well. In all comers with respiratory failure requiring mechanical ventilation between the ages of 79 and [de-identified], mortality is roughly 70%. Please contact us with any questions. Office [phone] or after hours through [address], x [phone]. Please note that voice recognition technology was used (while wearing a Covid mandated mask) in the preparation of this note and make therefore it may contain inadvertent transcription errors. If the patient is a COVID 19 isolation patient, the above physical exam reflects that of the examining physician for the day. Kwabena Odom MD,  M.D., F.C.C.P.     [facility] [address], [address] [place] [address]

## 2021-09-18 NOTE — PROGRESS NOTES
Critical Care Team - Daily Progress Note      Date and time: 2021 8:01 AM  Patient's name:  Emilee Greer  Medical Record Number: [id number]  Patient's account/billing number: [de-identified]  Patient's YOB: 1950  Age: 66 y.o. Date of Admission: 2021  2:25 PM  Length of stay during current admission: 19      Primary Care Physician: Fabiana Valenzuela MD  ICU Attending Physician: Dr. Tobar Lesser    Code Status: Full Code    Reason for ICU admission: Acute respiratory failure with hypoxia COVID-19      SUBJECTIVE:     OVERNIGHT EVENTS:       No acute events overnight for the patient    9/10: Acute respiratory distress overnight on BiPAP and she required intubation. : Patient overnight had increasing respiratory distress, was intubated by anesthesia. Patient sedation minimal and patient was awake, will increase sedation    : patient remains prone, FiO2 to 80%     no acute events overnight patient remains prone at this time. : No acute events overnight per overnight nursing staff patient's not required any ventilation changes at this time and saturating well. 9/15 patient laying flat supine saturations 87-88%. : off pressors, desaturating though SaO2 was 91.  : Patient continues to have poor saturations in the mid 60s, otherwise no other change at this time. Family was asking for a consultation with Dr. Lanie Echavarria as well.   : Now profoundly acidotic and hypercapnic, hypotensive despite phenylephrine and vasopressin  OBJECTIVE:     VITAL SIGNS:  BP (!) 119/50   Pulse 127   Temp 100.2 °F (37.9 °C) (Bladder)   Resp 24   Ht 5' 8\" (1.727 m)   Wt 221 lb 5.5 oz (100.4 kg)   SpO2 (!) 67%   BMI 33.65 kg/m²   Tmax over 24 hours:  Temp (24hrs), Av.8 °F (37.1 °C), Min:98.4 °F (36.9 °C), Max:100.2 °F (37.9 °C)      Patient Vitals for the past 6 hrs:   Temp Temp src Pulse Resp SpO2   21 0516 -- -- 127 24 (!) 67 %   21 0500 -- -- 127 24 (!) 68 %   21 0400 100.2 °F (37.9 °C) Bladder 124 24 (!) 69 %   09/18/21 0300 -- -- 119 (!) 0 (!) 69 %         Intake/Output Summary (Last 24 hours) at 9/18/2021 0801  Last data filed at 9/17/2021 2000  Gross per 24 hour   Intake 356 ml   Output 650 ml   Net -294 ml     Wt Readings from Last 2 Encounters:   09/16/21 221 lb 5.5 oz (100.4 kg)   08/30/21 213 lb (96.6 kg)     Body mass index is 33.65 kg/m².         PHYSICAL EXAMINATION:    General appearance -intubated sedated no distress  Mental status - sedated, paralyzed  Eyes - pupils equal and reactive   Chest - coarse rales bilaterally to auscultation,    Heart - tachycardiac rate, regular rhythm, normal S1, S2, no murmurs, rubs, clicks or gallops  Abdomen - soft, nontender, nondistended, no masses or organomegaly  Neurological -patient intubated sedated, pupils equal reactive to light  Extremities - peripheral pulses normal,  Mild pedal edema, no clubbing or cyanosis  Skin - normal coloration and turgor, no rashes, no suspicious skin lesions noted   Neuro: sedated,paralyzed     Any additional physical findings:    MEDICATIONS:    Scheduled Meds:   norepinephrine        ivermectin  200 mcg/kg (Adjusted) Oral Daily    insulin lispro  0-42 Units SubCUTAneous Q4H    sodium zirconium cyclosilicate  5 g Oral TID    insulin glargine  40 Units SubCUTAneous BID    baricitinib  2 mg Oral Daily    enoxaparin  50 mg SubCUTAneous BID    pantoprazole  40 mg IntraVENous Daily    And    sodium chloride (PF)  10 mL IntraVENous Daily    Refresh Lacri-Lube   Ophthalmic Q6H    chlorhexidine  15 mL Mouth/Throat BID    albuterol  2.5 mg Nebulization Q4H WA    dexamethasone  6 mg IntraVENous Q6H    [Held by provider] amLODIPine  5 mg Oral Daily    [Held by provider] losartan  100 mg Oral Daily    And    [Held by provider] hydroCHLOROthiazide  25 mg Oral Daily    sodium chloride flush  5-40 mL IntraVENous 2 times per day    Vitamin D  2,000 Units Oral Daily    zinc sulfate  50 mg Oral Daily    ascorbic acid  500 mg Oral BID    pravastatin  20 mg Oral Daily    levothyroxine  150 mcg Oral Daily     Continuous Infusions:   vasopressin (Septic Shock) infusion 0.04 Units/min (09/18/21 0722)    phenylephrine (CHAY-SYNEPHRINE) 50mg/250mL infusion 300 mcg/min (09/18/21 0723)    sodium chloride 50 mL/hr at 09/18/21 0000    norepinephrine Stopped (09/18/21 0725)    fentaNYL 5 mcg/ml in 0.9%  ml infusion 175 mcg/hr (09/18/21 0121)    propofol Stopped (09/17/21 2110)    cisatracurium (NIMBEX) infusion 2 mcg/kg/min (09/18/21 0121)    sodium chloride      dextrose       PRN Meds:   sodium chloride flush, 5-40 mL, PRN  sodium chloride, 25 mL, PRN  ondansetron, 4 mg, Q8H PRN   Or  ondansetron, 4 mg, Q6H PRN  polyethylene glycol, 17 g, Daily PRN  acetaminophen, 650 mg, Q6H PRN   Or  acetaminophen, 650 mg, Q6H PRN  glucose, 15 g, PRN  dextrose, 12.5 g, PRN  glucagon (rDNA), 1 mg, PRN  dextrose, 100 mL/hr, PRN  sodium chloride, 30 mL, PRN          VENT SETTINGS (Comprehensive) (if applicable):  Vent Information  $Ventilation: $Subsequent Day  Skin Assessment: Clean, dry, & intact  Equipment ID: [id number]  Equipment Changed: (S) Humidification  Vent Type: [id number]  Vent Mode: AC/VC  Vt Ordered: 380 mL  Rate Set: 24 bmp  Peak Flow: 60 L/min  Pressure Support: 0 cmH20  FiO2 : 100 %  SpO2: (!) 67 %  SpO2/FiO2 ratio: 67  PaO2/FiO2 ratio: 68  Sensitivity: 2  PEEP/CPAP: 14  I Time/ I Time %: 0 s  Humidification Source: Heated wire  Humidification Temp: 37  Humidification Temp Measured: 37.4  Circuit Condensation: Drained  Mask Type: Full face mask  Mask Size: Medium  Additional Respiratory  Assessments  Pulse: 127  Resp: 24  SpO2: (!) 67 %  Position: Semi-Blake's  Humidification Source: Heated wire  Humidification Temp: 37  Circuit Condensation: Drained  Oral Care: Mouth swabbed  Subglottic Suction Done?: Yes  Cuff Pressure (cm H2O): 29 cm H2O    ABGs: 7.087/86.5/37.5    Laboratory findings:    Complete Blood Count:   Recent Labs     09/16/21  0551 09/17/21  0747 09/18/21  0712   WBC 16.6* 23.3* 33.8*   HGB 11.1* 11.3* 12.9   HCT 35.3 36.7 44.5    233 228        Last 3 Blood Glucose:   Recent Labs     09/15/21  1520 09/16/21  0551 09/17/21  0747   GLUCOSE 153* 302* 301*        PT/INR:    Lab Results   Component Value Date    PROTIME 13.1 03/05/2016    INR 1.2 03/05/2016     PTT:    Lab Results   Component Value Date    APTT 32.5 03/05/2016       Comprehensive Metabolic Profile:   Recent Labs     09/16/21  0551 09/16/21  0551 09/17/21  0747 09/17/21  0747 09/18/21  0712     --  139  --  139   K 6.2*  --  6.5*  --  7.4*     --  107  --  106   CO2 25  --  27  --  25   BUN 70*  --  69*  --  97*   CREATININE 1.0  --  0.9  --  1.9*   GLUCOSE 302*  --  301*  --  250*   CALCIUM 8.8  --  8.9  --  8.6   PROT 5.2*   < > 5.1*   < > 5.2*   LABALBU 2.3*   < > 2.2*   < > 2.2*   BILITOT <0.2   < > <0.2   < > <0.2   ALKPHOS 89   < > 112*   < > 146*   AST 67*   < > 93*   < > 80*   ALT 84*   < > 187*   < > 192*    < > = values in this interval not displayed. Magnesium:   Lab Results   Component Value Date    MG 2.9 09/17/2021     Phosphorus:   Lab Results   Component Value Date    PHOS 3.1 09/17/2021     Ionized Calcium: No results found for: MARILYNON     Urinalysis:     Troponin: No results for input(s): TROPONINI in the last 72 hours.       ASSESSMENT:     Patient Active Problem List    Diagnosis Date Noted    Sepsis ([facility] 03/06/2016    E. coli septicemia ([facility] 03/06/2016    Acute cystitis 03/06/2016    Type 2 diabetes mellitus without complication ([facility] 01[birth year]40/1958    Hypertension 03/06/2016    Pneumonia due to COVID-19 virus 08/30/2021    Acute respiratory failure with hypoxia (HCC)     Elevated LFTs     Uncontrolled type 2 diabetes mellitus with hyperglycemia ([facility]     UTI due to extended-spectrum beta lactamase (ESBL) producing Escherichia coli 12/05/2018     SYSTEMS ASSESSMENT    Neuro     Sedated and paralyzed  Anxiety  Propofol and fentanyl for sedation    Paralytic on board  Continue to monitor    Respiratory     Acute hypoxic and hypercapnic respiratory failure secondary to COVID-19  COVID-19 pneumonia  Decadron 6 mg every 6 hours  Albuterol every 4 hours while awake  Baricitinib  patient continues to be significantly hypoxic proned as well as supine, she did not tolerate increase in PEEP as well. Ivermectin added yesterday  Cardiovascular     History of hypertension  Amlodipine-5 mg  Losartan 100 mg,   hydrochlorothiazide 25 mg daily   All held  Gastrointestinal   Prophylaxis  Continue monitor    Renal   Now in MYNOR with creat of 1.9 and more hyperkalemic with K 7.4. Given dextrose and insulin, already on Chalice Moynahan pushes given for acidosis though mostly respiratory  Continue monitor     Infectious Disease   COVID-19 pneumonia  Infectious disease following  Trend inflammatory markers  Continue monitor  Baricitinib  Ivermectin  Hematology/Oncology     Hemoglobin stable  Replace if less than 7   Lovenox 50 mg twice daily  Has superficial clot in antecubital fossa and cephalic vein. Endocrine   DM on Lantus-50 units but suboptimally controlled. Endocrine consulted. Gluxose 231 by finger stick this am  High-dose sliding scale  Hypothyroidism-Synthroid 150 MCG  continue to monitor    Social/Spiritual/DNR/Other   Pt.  Is highly likely to code today despite aggressive care  Full code  Vitamin protocol     9/18/2021 9:29 AM     >30 min CCT

## 2021-09-18 NOTE — PROGRESS NOTES
[facility] Progress Note    Admitting Date and Time: 8/30/2021  2:25 PM  Admit Dx: Pneumonia due to COVID-19 virus [U07.1, J12.82]    Subjective:  Patient is being followed for Pneumonia due to COVID-19 virus [U07.1, J12.82]   patient is intubated, O2 sats worsened overnight, at 50's%, on 100% FiO2,        norepinephrine        ivermectin  200 mcg/kg (Adjusted) Oral Daily    insulin lispro  0-42 Units SubCUTAneous Q4H    sodium zirconium cyclosilicate  5 g Oral TID    insulin glargine  40 Units SubCUTAneous BID    baricitinib  2 mg Oral Daily    enoxaparin  50 mg SubCUTAneous BID    pantoprazole  40 mg IntraVENous Daily    And    sodium chloride (PF)  10 mL IntraVENous Daily    Refresh Lacri-Lube   Ophthalmic Q6H    chlorhexidine  15 mL Mouth/Throat BID    albuterol  2.5 mg Nebulization Q4H WA    dexamethasone  6 mg IntraVENous Q6H    [Held by provider] amLODIPine  5 mg Oral Daily    [Held by provider] losartan  100 mg Oral Daily    And    [Held by provider] hydroCHLOROthiazide  25 mg Oral Daily    sodium chloride flush  5-40 mL IntraVENous 2 times per day    Vitamin D  2,000 Units Oral Daily    zinc sulfate  50 mg Oral Daily    ascorbic acid  500 mg Oral BID    pravastatin  20 mg Oral Daily    levothyroxine  150 mcg Oral Daily     sodium chloride flush, 5-40 mL, PRN  sodium chloride, 25 mL, PRN  ondansetron, 4 mg, Q8H PRN   Or  ondansetron, 4 mg, Q6H PRN  polyethylene glycol, 17 g, Daily PRN  acetaminophen, 650 mg, Q6H PRN   Or  acetaminophen, 650 mg, Q6H PRN  glucose, 15 g, PRN  dextrose, 12.5 g, PRN  glucagon (rDNA), 1 mg, PRN  dextrose, 100 mL/hr, PRN  sodium chloride, 30 mL, PRN         Objective:    BP (!) 98/58   Pulse 129   Temp 100.1 °F (37.8 °C) (Bladder)   Resp 24   Ht 5' 8\" (1.727 m)   Wt 221 lb 5.5 oz (100.4 kg)   SpO2 (!) 57%   BMI 33.65 kg/m²     General Appearance: intubated and paralyzed    Skin: warm and dry  Head: normocephalic and atraumatic  Eyes: pupils equal, round, conjunctivae normal  Neck: neck supple and non tender without mass   Pulmonary/Chest: decreased sounds bilaterally- no wheezes, normal air movement, no respiratory distress  Cardiovascular: normal rate, normal S1 and S2 and no carotid bruits  Abdomen: soft, non-distended, normal bowel sounds  Extremities: no cyanosis, no clubbing   Neurologic: intubated and sedated        Recent Labs     09/16/21  0551 09/17/21  0747 09/18/21  0712    139 139   K 6.2* 6.5* 7.4*    107 106   CO2 25 27 25   BUN 70* 69* 97*   CREATININE 1.0 0.9 1.9*   GLUCOSE 302* 301* 250*   CALCIUM 8.8 8.9 8.6       Recent Labs     09/16/21 0551 09/17/21  0747 09/18/21  0712   WBC 16.6* 23.3* 33.8*   RBC 3.60 3.70 4.27   HGB 11.1* 11.3* 12.9   HCT 35.3 36.7 44.5   MCV 98.1 99.2 104.2*   MCH 30.8 30.5 30.2   MCHC 31.4* 30.8* 29.0*   RDW 13.3 13.6 13.7    233 228   MPV 11.4 11.1 11.5        CTA:  1.  No indication for acute pulmonary emboli.       2.  Bilateral infiltrates that can be related with acute infectious viral   pneumonia.  Please correlate clinically.       3.  Some fullness of the collecting system of both kidneys.  See above   comments and recommendations. Assessment:    Active Problems:    Pneumonia due to COVID-19 virus    Acute respiratory failure with hypoxia (HCC)    Elevated LFTs    Uncontrolled type 2 diabetes mellitus with hyperglycemia ([facility]  Resolved Problems:    * No resolved hospital problems. *      Plan:  1. Acute hypoxic aspiratory failure oxygen saturation was 87% on room air on presentation, intubated on 9/12/2021,  CTA chest was negative for PE continue vent management FiO2 100% with O2 sats 79%  2.   COVID-19 pneumonia, patient finished course of remdesivir, continue on Decadron, started on baricitinib as well, family is requesting Endo-Mectin, there is no benefit from ivermectin I do not believe it would be of any help and it might cause harm so we will not order it. Currently intubated  3. Superimposed bacterial pneumonia with elevated procalcitonin, patient finished a course of levaquin , appreciate pulmonary input, CTA was negative for PE  4. Elevated transaminases, most likely due to Covid infection, improved continue monitoring. 5.  History of diabetes type 2, blood glucose is not controlled due to steroids usually on Trulicity, Metformin and glimepiride, those were placed on hold, continue sliding scale insulin,on lantus to 35 units and lispro very high-dose sliding scale per endocrinology  6. History of hypertension, blood pressure is acceptable, antihypertensive medication were placed on hold, consider restarting. .  7.  History of hypothyroidism, continue levothyroxine  8. Goals of care, patient has a very poor prognosis, especially that she is becoming more hypoxic on the vent. Palliative is on board, family is going through grief and in denial unfortunately, we don't want to give false hopes, they asked the night nurse to transfer the patient to a different facility, the patient is not stable for transfer, and I am not sure any facility would be able to accept her in the current condition, especially during the pandemic with the bed crisis   Patient is still full code  Patient is actively dying and there is not much we can offer, attempts were made to contact the family for updates by nursing staff today failed      NOTE: This report was transcribed using voice recognition software. Every effort was made to ensure accuracy; however, inadvertent computerized transcription errors may be present.   Electronically signed by Luh Dia MD on 9/18/2021 at 10:44 AM

## 2021-09-18 NOTE — PROGRESS NOTES
Pulmonary Progress Note    Admit Date: 2021  Hospital day                               PCP: Malia Davila MD    Chief Complaint (s):  Patient Active Problem List   Diagnosis    Sepsis ([facility]    E. coli septicemia ([facility]    Acute cystitis    [facility]    Hypertension    UTI due to extended-spectrum beta lactamase (ESBL) producing Escherichia coli    Pneumonia due to COVID-19 virus    Acute [facility] failure with [facility] ([facility]    Elevated LFTs    Uncontrolled type 2 diabetes mellitus with hyperglycemia (HCC)       Subjective:  · Intubated, ventilated, sedated and paralyzed.       Vitals:  VITALS:  BP (!) 96/57   Pulse 115   Temp 100.8 °F (38.2 °C) (Bladder)   Resp 24   Ht 5' 8\" (1.727 m)   Wt 221 lb 5.5 oz (100.4 kg)   SpO2 (!) 51%   BMI 33.65 kg/m²     24HR INTAKE/OUTPUT:      Intake/Output Summary (Last 24 hours) at 2021 1554  Last data filed at 2021 1435  Gross per 24 hour   Intake 5340 ml   Output 305 ml   Net 5035 ml       24HR PULSE OXIMETRY RANGE:    SpO2  Av.1 %  Min: 50 %  Max: 73 %    Medications:  IV:   vasopressin (Septic Shock) infusion 0.08 Units/min (21 1409)    phenylephrine (CHAY-SYNEPHRINE) 50mg/250mL infusion 300 mcg/min (21 1545)    EPINEPHrine infusion 5 mcg/min (21 1452)    sodium chloride 150 mL/hr at 21 1151    norepinephrine 100 mcg/min (21 1545)    fentaNYL 5 mcg/ml in 0.9%  ml infusion 175 mcg/hr (21 1548)    propofol Stopped (21)    cisatracurium (NIMBEX) infusion 2 mcg/kg/min (21 0121)    sodium chloride      dextrose         Scheduled Meds:   norepinephrine        ivermectin  200 mcg/kg (Adjusted) Oral Daily    insulin lispro  0-42 Units SubCUTAneous Q4H    sodium zirconium cyclosilicate  5 g Oral TID    insulin glargine  40 Units SubCUTAneous BID    baricitinib  2 mg Oral Daily    enoxaparin  50 mg SubCUTAneous BID  pantoprazole  40 mg IntraVENous Daily    And    sodium chloride (PF)  10 mL IntraVENous Daily    Refresh Lacri-Lube   Ophthalmic Q6H    chlorhexidine  15 mL Mouth/Throat BID    albuterol  2.5 mg Nebulization Q4H WA    dexamethasone  6 mg IntraVENous Q6H    [Held by provider] amLODIPine  5 mg Oral Daily    [Held by provider] losartan  100 mg Oral Daily    And    [Held by provider] hydroCHLOROthiazide  25 mg Oral Daily    sodium chloride flush  5-40 mL IntraVENous 2 times per day    Vitamin D  2,000 Units Oral Daily    zinc sulfate  50 mg Oral Daily    ascorbic acid  500 mg Oral BID    pravastatin  20 mg Oral Daily    levothyroxine  150 mcg Oral Daily       Diet:   Adult Oral Nutrition Supplement; Low Calorie/High Protein Oral Supplement  Adult Oral Nutrition Supplement; Other Oral Supplement; GELATEIN 20  ADULT TUBE FEEDING; Orogastric; Diabetic; Continuous; 20; Yes; 20; Q 4 hours; 20; 30; Q 4 hours     EXAM:  General: Sedated and paralyzed  Eyes: PERRL. No sclera icterus. No conjunctival injection. ENT: No discharge. Pharynx clear. Neck: Trachea midline. Normal thyroid. Resp: No accessory muscle use. No rales. No wheezing. No rhonchi. CV: Regular rate. Regular rhythm. No murmur or rub. Abd: Non-tender. Non-distended. No masses. No organomegaly. Normal bowel sounds. Skin: Warm and dry. No nodule on exposed extremities. No rash on exposed extremities. Ext: No cyanosis, clubbing, 4+ edema  Lymph: No cervical LAD. No supraclavicular LAD. M/S: No cyanosis. No joint deformity. No clubbing. Neuro: Awake. Follows commands. Positive pupils/gag/corneals. Normal pain response.        Results:  CBC:   Recent Labs     09/16/21  0551 09/17/21  0747 09/18/21  0712   WBC 16.6* 23.3* 33.8*   HGB 11.1* 11.3* 12.9   HCT 35.3 36.7 44.5   MCV 98.1 99.2 104.2*    233 228     BMP:   Recent Labs     09/16/21  0551 09/16/21  0551 09/17/21  0747 09/18/21  0712 09/18/21  1124      < > 139 139 137 K 6.2*   < > 6.5* 7.4* 6.7*      < > 107 106 105   CO2 25   < > 27 25 24   PHOS 3.5  --  3.1 6.0*  --    BUN 70*   < > 69* 97* 104*   CREATININE 1.0   < > 0.9 1.9* 2.2*    < > = values in this interval not displayed. LIVER PROFILE:   Recent Labs     09/16/21  0551 09/17/21  0747 09/18/21  0712   AST 67* 93* 80*   ALT 84* 187* 192*   BILITOT <0.2 <0.2 <0.2   ALKPHOS 89 112* 146*     PT/INR: No results for input(s): PROTIME, INR in the last 72 hours. APTT: No results for input(s): APTT in the last 72 hours. Pathology:  1. N/A      Microbiology:  1. None    Recent ABG:   Recent Labs     09/18/21  0725   PH 7.087*   PO2 37.5*   PCO2 86.5*   HCO3 25.5   BE -6.5*   O2SAT 64.2*   METHB 0.3   O2HB 63.7*   COHB 0.5   O2CON 12.6   HHB 35.5*   THB 14.1     FiO2 : 100 %  I:E Ratio: 1:2.60    Recent Films:  XR CHEST PORTABLE   Final Result   Bilateral pulmonary infiltrates, slightly increased since the prior day. XR CHEST PORTABLE   Final Result   1. Persistent the bilateral infiltrates compatible with acute infectious   viral pneumonia. 2.  Endotracheal tube in good position. 3.  NG tube towards the stomach. 4.  Left internal jugular central venous catheter tip is towards the left   subclavian/axillary vein as seen previously. 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 US DUP UPPER EXTREMITIES BILATERAL VENOUS   Final Result   1. Occlusive thrombus in the bilateral superficial cephalic veins and in the   antecubital fossa. No color flow identified in these regions. 2.  No additional evidence of thrombus in either upper extremity. US DUP LOWER EXTREMITIES BILATERAL VENOUS   Final Result   No evidence of DVT in either lower extremity. XR CHEST PORTABLE   Final Result   1. Multifocal bilateral airspace disease which is more advanced when compared   with the patient's prior study of 09/11/2021   2.  Findings of pneumomediastinum. There is significant subcutaneous   emphysema seen within the soft tissues of the neck. XR CHEST PORTABLE   Final Result   1. 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 2. Progressively decreasing pneumomediastinum, unchanged bilateral pneumonia. XR CHEST PORTABLE   Final Result   1. Decreased pneumomediastinum, there is a small amount of extrapleural gas   in the right apex but no sign of pneumothorax. 2. Little change bilateral airspace consolidation concerning for pneumonia. 3. Lines okay         XR CHEST PORTABLE   Final Result   1. Pneumomediastinum. 2. Question of very small left apical pneumothorax. 3. Unchanged bilateral infiltrates. 4. Status post intubation with ET tube tip located 3.3 cm above gurinder. Findings were sent to Radiology Results [address] at 5:13 a.m. on   09/11/2021 to be communicated to a licensed caregiver. XR CHEST ABDOMEN NG PLACEMENT   Final Result   Nasogastric tube terminates in stomach. XR CHEST PORTABLE   Final Result   Stable bilateral pulmonary airspace opacities. CTA PULMONARY W CONTRAST   Final Result   1. No indication for acute pulmonary emboli. 2.  Bilateral infiltrates that can be related with acute infectious viral   pneumonia. Please correlate clinically. 3.  Some fullness of the collecting system of both kidneys. See above   comments and recommendations. XR CHEST PORTABLE   Final Result   Bilateral hazy opacities are similar to the previous exam and are suspicious   for pneumonia. This appearance can be seen with COVID-19. US DUP LOWER EXTREMITIES BILATERAL VENOUS   Final Result   No evidence of DVT in either lower extremity. Assessment:  1. Severe COVID-19 pneumonia with profound hypoxic respiratory failure. This is necessitated mechanical ventilation, sedation and paralysis. Proning is used as well.   The patient is on baricitinib and has completed remdesivir. She continues on Decadron. DVT prophylaxis is in place with relative contraindication to therapeutic anticoagulation as this has been seem to worsen outcome. 2. Multisystem organ failure        Plan:  1. 5-day trial of ivermectin 200 mcg/kg/day. Reference  2. Continue current care otherwise    Discussed with family who clearly understand the terminal nature of the illness. There is reluctance to discontinue therapies in the hope that God takes her.       Time at the bedside, reviewing labs and radiographs, reviewing updated notes and consultations, discussing with staff and family was more than 35 minutes. Please note that voice recognition technology was used in the preparation of this note and make therefore it may contain inadvertent transcription errors. If the patient is a COVID 19 isolation patient, the above physical exam reflects that of the examining physician for the day. Diandra Avila MD,  M.D., F.C.C.P.     [facility], [address] [place] [address], [address] [place] [address]

## 2021-09-18 NOTE — PROGRESS NOTES
1030:   Attempted to call , no answer again. Left voicemail and asked to call back. Attempted to call son Yandel Ortiz, no answer and no voicemail to leave message.

## 2021-09-18 NOTE — PATIENT CARE CONFERENCE
Intensive Care Daily Quality Rounding Checklist        ICU Team Members: bedside nurse, charge nurse, DR. Erick Kathleen, RT     ICU Day #: NUMBER 11     Intubation Date: September 11,2021     Ventilator Day #: NUMBER: 8     Central Line Insertion Date: September 11,2021                                                    Day #: NUMBER: 8      Arterial Line Insertion Date: September 14, 2021 new arterial line placed                             Day #: NUMBER: 5     Temporary Hemodialysis Catheter Insertion Date:  N/A                             Day # N/A     DVT Prophylaxis: Lovenox    GI Prophylaxis: Protonix     Pinzon Catheter Insertion Date: September 8,2021                                        Day #: 11                             Continued need (if yes, reason documented and discussed with physician): yes, accurate intake and output in a critically ill patient.     Skin Issues/ Wounds and ordered treatment discussed on rounds: no issues/ SOS precautions     Goals/Plan of care:  vent management, wean pressors as tolerated, monitor labs and treat as necessary, bicarb, update family

## 2021-09-18 NOTE — PROGRESS NOTES
Extensive conversation with  Arlene Baker and son Marta Bender outside of room this afternoon. Wish to change code status to a DNR- CCA. Nick Chao RN to witness. Dr. León Limon made aware.

## 2021-09-18 NOTE — DISCHARGE SUMMARY
[facility] Physician Discharge Summary       Dispo:        Patient ID:  Carissa Mathur  [id number]  66 y.o.  1950    Admit date: 2021    Discharge date and time:  2021  5:52 PM    Admission Diagnoses: Active Problems:    Pneumonia due to COVID-19 virus    Acute respiratory failure with hypoxia (HCC)    Elevated LFTs    Uncontrolled type 2 diabetes mellitus with hyperglycemia ([address]  Resolved Problems:    * No resolved hospital problems. *      Discharge Diagnoses: Active Problems:    Pneumonia due to COVID-19 virus    Acute respiratory failure with hypoxia (HCC)    Elevated LFTs    Uncontrolled type 2 diabetes mellitus with hyperglycemia ([address]  Resolved Problems:    * No resolved hospital problems. *      Consults:  IP CONSULT TO PHARMACY  IP CONSULT TO IV TEAM  IP CONSULT TO IV TEAM  IP CONSULT TO IV TEAM  IP CONSULT TO IV TEAM  IP CONSULT TO DIETITIAN  IP CONSULT TO INFECTIOUS DISEASES  IP CONSULT TO PALLIATIVE CARE  IP CONSULT TO PULMONOLOGY      Hospital Course:   Patient Carissa Mathur is a 66 y.o. presented with cough fatigue and shortness of breath, found to be in acute hypoxic respiratory failure secondary to Pneumonia due to COVID-19 virus [U07.1, J12.82], will admit the patient for evaluation treated her for the following    1. Acute hypoxic aspiratory failure oxygen saturation was 87% on room air on presentation, continue to worsen throughout the hospitalization and ended up intubated on 2021,  CTA chest was negative for PE, steadily declining on the vent  2. COVID-19 pneumonia, patient finished course of remdesivir, continue on Decadron, started on baricitinib as well, family was requesting ivermectin, there is no benefit from ivermectin. 3.  Superimposed bacterial pneumonia with elevated procalcitonin, patient finished a course of levaquin , appreciate pulmonary input, CTA was negative for PE  4.   Elevated transaminases, most likely due to Covid infection,

## 2021-09-18 NOTE — PROGRESS NOTES
0945: Attempted to call  Meli Machuca and got no answer. 8858: Attempted to call son Unknown Jury with no answer as well.

## 2021-09-19 LAB
L. PNEUMOPHILA SEROGP 1 UR AG: NORMAL
STREP PNEUMONIAE ANTIGEN, URINE: NORMAL

## 2021-09-23 LAB — CULTURE, BLOOD 2: NORMAL

## 2021-09-24 LAB
BLOOD CULTURE, ROUTINE: ABNORMAL
ORGANISM: ABNORMAL

## 2022-03-01 NOTE — CONSULTS
9/11/2021  9:33 AM      Comprehensive Nutrition Assessment    Type and Reason for Visit:  Reassess, Consult (Consult re: Tube Feeding Ordering and Management)    Nutrition Recommendations/Plan: Continue Current TF, as tolerated, while EN needed:    TF ORDER: Diabetic TF (Glucerna 1.5) to goal rate 50 ml/hr x 23 hr/d (Hold 30 min before and after Synthroid)  This will provide: 1150 ml/d, 1725 hollie (1880 hollie total w/propofol), 95 g pro, 873 ml free water  This regimen will meet 100% calorie and protein needs    Nutrition Assessment:  Pt required intubated this AM 9/11 d/t Covid+ PNA. Will order TF to meet pt needs on current level of propofol. Will continue to monitor tolerance and status changes    Malnutrition Assessment:  Malnutrition Status: At risk for malnutrition (Comment)    Context:  Acute Illness     Findings of the 6 clinical characteristics of malnutrition:  Energy Intake:  1 - 75% or less of estimated energy requirements for 7 or more days  Weight Loss:  Unable to assess     Body Fat Loss:  Unable to assess (Covid Isolation)     Muscle Mass Loss:  Unable to assess (Covid Isolation)    Fluid Accumulation:  No significant fluid accumulation     Strength:  Not Performed    Estimated Daily Nutrient Needs:  Energy (kcal):   (1903 hollie); Weight Used for Energy Requirements:  Current     Protein (g):  85-95 (1.3-1.5 g/kg); Weight Used for Protein Requirements:  Ideal        Fluid (ml/day):  per Critical Care; Method Used for Fluid Requirements:  Other (Comment)      Nutrition Related Findings:  intubated/sedated, trace general edema, -I/O 4.5L, OGT, soft abd +BS      Wounds:   (redness buttocks)       Current Nutrition Therapies:    Adult Oral Nutrition Supplement; Low Calorie/High Protein Oral Supplement  Adult Oral Nutrition Supplement; Other Oral Supplement; GELATEIN 20  ADULT DIET;  Regular; 4 carb choices (60 gm/meal)  ADULT TUBE FEEDING; Orogastric; Diabetic; Continuous; 20; Yes; 20; Q 4 hours; 50; 30; Q 4 hours  Additional Calorie Sources:   propofol =156 hollie    Anthropometric Measures:  · Height: 5' 8\" (172.7 cm)  · Current Body Weight: 215 lb 4.8 oz (97.7 kg) [facility]   · Usual Body Weight:  (not available)     · Ideal Body Weight: 140 lbs; % Ideal Body Weight 153.8 %   · BMI: 32.7  · BMI Categories: Obese Class 1 (BMI 30.0-34. 9)       Nutrition Diagnosis:   · Inadequate oral intake related to impaired respiratory function as evidenced by intubation, nutrition support - enteral nutrition, NPO or clear liquid status due to medical condition      Nutrition Interventions:   Food and/or Nutrient Delivery:  Continue NPO, Start Tube Feeding (Diabetic TF to goal 50 ml/hr x 23 hr/d -hold 30 min before/after Synthroid)  Nutrition Education/Counseling:  No recommendation at this time   Coordination of Nutrition Care:  Continue to monitor while inpatient    Goals:  Pt brayan EN at goal rate       Nutrition Monitoring and Evaluation:   Behavioral-Environmental Outcomes:  None Identified   Food/Nutrient Intake Outcomes:  Enteral Nutrition Intake/Tolerance  Physical Signs/Symptoms Outcomes:  GI Status, Biochemical Data, Fluid Status or Edema, Nutrition Focused Physical Findings, Skin, Weight     Discharge Planning:     Too soon to determine     Electronically signed by Daryn Marie RD, CNSC, LD on 9/11/21 at 9:33 AM EDT    Contact: [phone] Quality 130: Documentation Of Current Medications In The Medical Record: Current Medications Documented Quality 431: Preventive Care And Screening: Unhealthy Alcohol Use - Screening: Patient not identified as an unhealthy alcohol user when screened for unhealthy alcohol use using a systematic screening method Quality 110: Preventive Care And Screening: Influenza Immunization: Influenza Immunization Administered during Influenza season Quality 226: Preventive Care And Screening: Tobacco Use: Screening And Cessation Intervention: Patient screened for tobacco use and is an ex/non-smoker Detail Level: Detailed

## 2022-12-15 NOTE — PROGRESS NOTES
[address] [place] [address] [facility]  [facility]AY[facility]  Progress Note    SUBJECTIVE:  No chief complaint on file. The patient is still in the ICU. She is still intubated, sedated and paralyzed. She is now on 3 vasopressors. Review of systems:  As stated above in the chief complaint, otherwise negative.     Medications:  Scheduled Meds:   norepinephrine        sodium bicarbonate  50 mEq IntraVENous Q5 Min    dextrose  25 g IntraVENous Once    insulin regular  10 Units IntraVENous Once    ivermectin  200 mcg/kg (Adjusted) Oral Daily    insulin lispro  0-42 Units SubCUTAneous Q4H    sodium zirconium cyclosilicate  5 g Oral TID    insulin glargine  40 Units SubCUTAneous BID    baricitinib  2 mg Oral Daily    enoxaparin  50 mg SubCUTAneous BID    pantoprazole  40 mg IntraVENous Daily    And    sodium chloride (PF)  10 mL IntraVENous Daily    Refresh Lacri-Lube   Ophthalmic Q6H    chlorhexidine  15 mL Mouth/Throat BID    albuterol  2.5 mg Nebulization Q4H WA    dexamethasone  6 mg IntraVENous Q6H    [Held by provider] amLODIPine  5 mg Oral Daily    [Held by provider] losartan  100 mg Oral Daily    And    [Held by provider] hydroCHLOROthiazide  25 mg Oral Daily    sodium chloride flush  5-40 mL IntraVENous 2 times per day    Vitamin D  2,000 Units Oral Daily    zinc sulfate  50 mg Oral Daily    ascorbic acid  500 mg Oral BID    pravastatin  20 mg Oral Daily    levothyroxine  150 mcg Oral Daily     Continuous Infusions:   vasopressin (Septic Shock) infusion 0.08 Units/min (09/18/21 0800)    phenylephrine (CHAY-SYNEPHRINE) 50mg/250mL infusion 300 mcg/min (09/18/21 0723)    sodium chloride 50 mL/hr at 09/18/21 0000    norepinephrine 30 mcg/min (09/18/21 0830)    fentaNYL 5 mcg/ml in 0.9%  ml infusion 175 mcg/hr (09/18/21 0121)    propofol Stopped (09/17/21 2110)    cisatracurium (NIMBEX) infusion 2 mcg/kg/min (09/18/21 0121)    sodium chloride      dextrose       PRN Meds:sodium chloride flush, sodium chloride, ondansetron **OR** ondansetron, polyethylene glycol, acetaminophen **OR** acetaminophen, glucose, dextrose, glucagon (rDNA), dextrose, sodium chloride    OBJECTIVE:  BP (!) 98/58   Pulse 123   Temp 100.1 °F (37.8 °C) (Bladder)   Resp 24   Ht 5' 8\" (1.727 m)   Wt 221 lb 5.5 oz (100.4 kg)   SpO2 (!) 62%   BMI 33.65 kg/m²   Temp  Av °F (37.2 °C)  Min: 98.4 °F (36.9 °C)  Max: 100.2 °F (37.9 °C)  Constitutional: The patient is lying in bed in the ICU. She is supine, sedated and paralyzed. FiO2 100%. PEEP 14. Skin: Warm and dry. No rashes were noted. HEENT: Nonreactive pupils. No thrush. ET tube. OG tube. Neck: Not examined. Chest: Good breath sounds bilaterally. No crackles. Cardiovascular: Heart sounds rhythmic and regular. Abdomen: Round, soft. Positive bowel sounds. Extremities: No edema. Lines: Right radial arterial line 2021. Left IJ TLC 2021. Pinzon catheter.     Laboratory and Tests Review:  Lab Results   Component Value Date    WBC 33.8 (H) 2021    WBC 23.3 (H) 2021    WBC 16.6 (H) 2021    HGB 12.9 2021    HCT 44.5 2021    .2 (H) 2021     2021     Lab Results   Component Value Date    NEUTROABS 21.67 (H) 2021    NEUTROABS 14.41 (H) 2021    NEUTROABS 26.38 (H) 09/15/2021     No results found for: [facility]  Lab Results   Component Value Date     (H) 2021    AST 80 (H) 2021    ALKPHOS 146 (H) 2021    BILITOT <0.2 2021     Lab Results   Component Value Date     2021    K 7.4 2021    K 4.4 2021     2021    CO2 25 2021    BUN 97 2021    CREATININE 1.9 2021    CREATININE 0.9 2021    CREATININE 1.0 2021    GFRAA 31 2021    LABGLOM 26 2021    GLUCOSE 250 2021    PROT 5.2 2021    LABALBU 2.2 2021    CALCIUM 8.6 2021    BILITOT <0.2 2021    ALKPHOS 146 09/18/2021    AST 80 09/18/2021     09/18/2021     Lab Results   Component Value Date    CRP 3.4 (H) 09/17/2021    CRP 8.2 (H) 09/16/2021    CRP 13.1 (H) 09/15/2021     No results found for: Mae Cevallos  Radiology:      Microbiology:   Nares screen MRSA: Negative  Blood cultures 8/31/2021: Negative  Blood cultures 9/18/2021: Pending  Streptococcus pneumoniae/Legionella urine Ag: negative   Respiratory culture 9/11/2021: Not obtained  Respiratory culture 9/13/2021: OP hermes reduced  Respiratory cultures 9/18/2021: Pending    ASSESSMENT:  · SARS-CoV-2 infection with severe pneumonia. Completed course of Remdesivir  · Acute respiratory failure  · Leukocytosis.   The patient is on steroids  · Possible superimposed bacterial pneumonia, treated with Levofloxacin  · Acute kidney injury  · Multiple organ system failure  · Septic shock secondary to viral infection  · Elevation of transaminases    PLAN:  · Continue supportive treatment  · Continue Baricitinib, day 4  · Ivermectin, day 2  · Repeat blood and respiratory cultures  · Vancomycin and Cefepime x1 after blood cultures are done    Discussed with Dr. León Limon and Dr. Casi De La Cruz MD  8:51 AM  9/18/2021 patient